# Patient Record
Sex: MALE | Race: WHITE | NOT HISPANIC OR LATINO | Employment: OTHER | URBAN - METROPOLITAN AREA
[De-identification: names, ages, dates, MRNs, and addresses within clinical notes are randomized per-mention and may not be internally consistent; named-entity substitution may affect disease eponyms.]

---

## 2018-03-07 ENCOUNTER — OFFICE VISIT (OUTPATIENT)
Dept: CARDIOLOGY | Facility: CLINIC | Age: 73
End: 2018-03-07

## 2018-03-07 VITALS
HEIGHT: 70 IN | HEART RATE: 53 BPM | DIASTOLIC BLOOD PRESSURE: 73 MMHG | BODY MASS INDEX: 28.35 KG/M2 | SYSTOLIC BLOOD PRESSURE: 158 MMHG | WEIGHT: 198 LBS

## 2018-03-07 DIAGNOSIS — E78.5 DYSLIPIDEMIA: ICD-10-CM

## 2018-03-07 DIAGNOSIS — R09.89 LABILE HYPERTENSION: ICD-10-CM

## 2018-03-07 DIAGNOSIS — I11.9 HYPERTENSIVE HEART DISEASE WITHOUT HEART FAILURE: Primary | ICD-10-CM

## 2018-03-07 PROCEDURE — 99213 OFFICE O/P EST LOW 20 MIN: CPT | Performed by: INTERNAL MEDICINE

## 2018-03-07 RX ORDER — ALFUZOSIN HYDROCHLORIDE 10 MG/1
10 TABLET, EXTENDED RELEASE ORAL DAILY
COMMUNITY

## 2018-03-07 RX ORDER — ATORVASTATIN CALCIUM 20 MG/1
20 TABLET, FILM COATED ORAL DAILY
COMMUNITY
End: 2020-08-21

## 2018-03-07 RX ORDER — CHLORAL HYDRATE 500 MG
CAPSULE ORAL
COMMUNITY

## 2018-03-07 RX ORDER — FINASTERIDE 5 MG/1
5 TABLET, FILM COATED ORAL DAILY
COMMUNITY

## 2018-03-07 RX ORDER — FEXOFENADINE HCL 180 MG/1
180 TABLET ORAL DAILY
COMMUNITY
End: 2022-11-10

## 2018-03-07 RX ORDER — MELATONIN
2000 DAILY
COMMUNITY

## 2018-03-07 RX ORDER — ASCORBIC ACID 500 MG
500 TABLET ORAL DAILY
COMMUNITY

## 2018-03-07 NOTE — PROGRESS NOTES
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      INTERNIST: Anand De Souza MD  VA PHYSICIAN: Kay Cannon MD  COLORECTAL SURGEON: Alfonso Damon MD  IMMUNOLOGIST:  Deysi Smith MD    Chief Complaint:   Chief Complaint   Patient presents with   • Hypertension     Problem List:  1. 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 Remote progressive exertional dyspnea/chest pain syndrome with acceptable GXT and borderline abnormal acceptable Holter monitor, April 2009.  b. Recurrent progressive NYHA class III symptoms with abnormal EKG and acceptable chest x-ray with essentially normal right dominant coronary artery and preserved systolic LV dysfunction, LVEF (0.66) with continued medical therapy felt warranted, July 2009.   c. Residual CCS class I symptoms.   2. Moderate dyslipidemia.   3. Labile hypertension.  4. Mildly overweight, BMI 28.4.  5. Obstructive sleep apnea with intermittent CPAP therapy (did not tolerate).   6. Hiatal hernia/probable GERD, April 2009.  7. Remote left calf DVT/Coumadin therapy - data deficit.   8. Chronic lower tract obstructive symptoms/probable BPH.   9. Crohn’s disease with partial colectomy and colostomy - data deficit.   10. 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   11. Chronic intermittent anxiety/remote depression.   12. Chronic insomnia.   13. Intermittent allergic rhinitis.   14. Remote operations:  a. Partial colectomy with colostomy, 2001.   b. Hand surgery secondary to trauma, 2004.     Allergies   Allergen Reactions   • Imuran [Azathioprine]       hemorrhagic urticaria.         Current Outpatient Prescriptions:   •  alfuzosin (UROXATRAL) 10 MG 24 hr tablet, Take 10 mg by mouth Daily., Disp: , Rfl:   •  aspirin 81 MG EC tablet, Take 81 mg by mouth Daily., Disp: , Rfl:   •  atorvastatin (LIPITOR)  20 MG tablet, Take 20 mg by mouth Daily., Disp: , Rfl:   •  Budesonide (ENTOCORT EC) 3 MG 24 hr capsule, Take 6 mg by mouth 2 (Two) Times a Day., Disp: , Rfl:   •  Calcium Carbonate (CALCIUM 600 PO), Take  by mouth Daily., Disp: , Rfl:   •  cholecalciferol (VITAMIN D3) 1000 units tablet, Take 2,000 Units by mouth Daily., Disp: , Rfl:   •  diphenoxylate-atropine (LOMOTIL) 2.5-0.025 MG per tablet, Take 1 tablet by mouth As Needed for diarrhea., Disp: , Rfl:   •  fexofenadine (ALLEGRA) 180 MG tablet, Take 180 mg by mouth Daily., Disp: , Rfl:   •  glucosamine sulfate 500 MG capsule capsule, Take 500 mg by mouth 2 (Two) Times a Day With Meals., Disp: , Rfl:   •  magnesium oxide (MAGOX) 400 (241.3 MG) MG tablet tablet, Take 400 mg by mouth Daily., Disp: , Rfl:   •  montelukast (SINGULAIR) 10 MG tablet, Take 10 mg by mouth Every Night., Disp: , Rfl:   •  Multiple Vitamins-Minerals (MULTIVITAMIN ADULT PO), Take  by mouth Daily., Disp: , Rfl:   •  Omega-3 Fatty Acids (FISH OIL) 1000 MG capsule capsule, Take  by mouth Daily With Breakfast., Disp: , Rfl:   •  omeprazole (priLOSEC) 20 MG capsule, Take 20 mg by mouth Daily., Disp: , Rfl:   •  sertraline (ZOLOFT) 50 MG tablet, Take 50 mg by mouth Daily., Disp: , Rfl:   •  vitamin C (ASCORBIC ACID) 500 MG tablet, Take 500 mg by mouth Daily., Disp: , Rfl:   · Finasteride 5 mg daily    HISTORY OF PRESENT ILLNESS: Patient returns for followup after a 16-month hiatus. He denies chest pain, SOB, palpitations, presyncope, edema, and syncope.  He has not had any hospitalizations or surgeries.  He had his flu and pneumonia vaccination last fall.  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  He has Crohn's disease and is now having plasma infusions once a week under the direction of Dr. Deysi Smith because of compromised immunity; data deficit.      Review of Systems   HENT: Positive for tinnitus.    Genitourinary: Positive for frequency.  "     Obtained and otherwise negative except as outlined in problem list and HPI.    Procedures       Objective:       Vitals:    03/07/18 1428 03/07/18 1436   BP: 125/82 158/73   BP Location: Left arm Left arm   Patient Position: Sitting Standing   Pulse: 50 53   Weight: 89.8 kg (198 lb)    Height: 177.8 cm (70\")      Body mass index is 28.41 kg/(m^2).   Last weight:  199 lbs.    Physical Exam   Constitutional: He is oriented to person, place, and time. He appears well-developed and well-nourished.   Neck: No JVD present. Carotid bruit is not present. No thyromegaly present.   Cardiovascular: Regular rhythm, S1 normal, S2 normal and normal heart sounds.  Exam reveals no gallop, no S3 and no friction rub.    No murmur heard.  Pulses:       Dorsalis pedis pulses are 2+ on the right side, and 2+ on the left side.        Posterior tibial pulses are 2+ on the right side, and 2+ on the left side.   Pulmonary/Chest: Effort normal and breath sounds normal. He has no wheezes. He has no rhonchi. He has no rales.   Abdominal: Soft. He exhibits no mass. There is no hepatosplenomegaly. There is no tenderness. There is no guarding.   Bowel sounds audible x4   Musculoskeletal: Normal range of motion. He exhibits no edema.   Lymphadenopathy:     He has no cervical adenopathy.   Neurological: He is alert and oriented to person, place, and time.   Skin: Skin is warm, dry and intact. No rash noted.   Vitals reviewed.        Lab Review: No recent laboratory results available for review today.          Assessment:   Overall continued acceptable course with no interim cardiopulmonary complaints with acceptable functional status. We will defer additional diagnostic or therapeutic intervention from a cardiac perspective at this time. We encouraged the patient to send us his most recent laboratory values.       Diagnosis Plan   1. Hypertensive heart disease without heart failure  Stable; No recurrent angina pectoris or CHF.     2. Labile " hypertension  Controlled   3. Dyslipidemia  No new labs          Plan:         1. Patient to continue current medications and close follow up with the above providers.  2. Tentative cardiology follow up in March 2019, or patient may return sooner PRN.    Scribed for Adria Mayers MD by Corine Newsome, APRN. 3/7/2018  2:37 PM    I, Adria Mayers MD, [facility], personally performed the services described in this documentation as scribed by the above named individual in my presence, and it is both accurate and complete. At 2:55 PM on 03/07/2018

## 2019-03-13 ENCOUNTER — OFFICE VISIT (OUTPATIENT)
Dept: CARDIOLOGY | Facility: CLINIC | Age: 74
End: 2019-03-13

## 2019-03-13 VITALS
WEIGHT: 200 LBS | DIASTOLIC BLOOD PRESSURE: 69 MMHG | HEART RATE: 61 BPM | BODY MASS INDEX: 28.63 KG/M2 | HEIGHT: 70 IN | SYSTOLIC BLOOD PRESSURE: 112 MMHG

## 2019-03-13 DIAGNOSIS — I11.9 HYPERTENSIVE HEART DISEASE WITHOUT HEART FAILURE: Primary | ICD-10-CM

## 2019-03-13 DIAGNOSIS — E78.5 DYSLIPIDEMIA: ICD-10-CM

## 2019-03-13 DIAGNOSIS — R09.89 LABILE HYPERTENSION: ICD-10-CM

## 2019-03-13 PROCEDURE — 99214 OFFICE O/P EST MOD 30 MIN: CPT | Performed by: INTERNAL MEDICINE

## 2019-03-13 NOTE — PROGRESS NOTES
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      INTERNIST: Anand De Souza MD  VA PHYSICIAN: Kay Cannon MD  COLORECTAL SURGEON: Alfonso Lester MD  IMMUNOLOGIST:  Deysi Smith MD    Chief Complaint:   Chief Complaint   Patient presents with   • Hypertension     Problem List:  1. 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 Remote progressive exertional dyspnea/chest pain syndrome with acceptable GXT and borderline abnormal acceptable Holter monitor, April 2009.  b. Recurrent progressive NYHA class III symptoms with abnormal EKG and acceptable chest x-ray with essentially normal right dominant coronary artery and preserved systolic LV dysfunction, LVEF (0.66) with continued medical therapy felt warranted, July 2009.   c. Residual CCS class I symptoms.   2. Moderate dyslipidemia.   3. Labile hypertension.  4. Mildly overweight, BMI 28.7.  5. Obstructive sleep apnea with intermittent CPAP therapy (did not tolerate).   6. Hiatal hernia/probable GERD, April 2009.  7. Remote left calf DVT/Coumadin therapy - data deficit.   8. Chronic lower tract obstructive symptoms/probable BPH.   9. Crohn’s disease with partial colectomy and colostomy - data deficit.   10. 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   11. Chronic intermittent anxiety/remote depression.   12. Chronic insomnia.   13. Intermittent allergic rhinitis.   14. Remote operations:  a. Partial colectomy with colostomy, 2001.   b. Hand surgery secondary to trauma, 2004.        Allergies   Allergen Reactions   • Imuran [Azathioprine]       hemorrhagic urticaria.       Current Outpatient Medications:   •  alfuzosin (UROXATRAL) 10 MG 24 hr tablet, Take 10 mg by mouth Daily., Disp: , Rfl:   •  aspirin 81 MG EC tablet, Take 81 mg by mouth Daily., Disp: , Rfl:   •  atorvastatin (LIPITOR)  20 MG tablet, Take 20 mg by mouth Daily., Disp: , Rfl:   •  Budesonide (ENTOCORT EC) 3 MG 24 hr capsule, Take 6 mg by mouth 2 (Two) Times a Day., Disp: , Rfl:   •  Calcium Carbonate (CALCIUM 600 PO), Take  by mouth Daily., Disp: , Rfl:   •  cholecalciferol (VITAMIN D3) 1000 units tablet, Take 2,000 Units by mouth Daily., Disp: , Rfl:   •  diphenoxylate-atropine (LOMOTIL) 2.5-0.025 MG per tablet, Take 1 tablet by mouth As Needed for diarrhea., Disp: , Rfl:   •  fexofenadine (ALLEGRA) 180 MG tablet, Take 180 mg by mouth Daily., Disp: , Rfl:   •  finasteride (PROSCAR) 5 MG tablet, Take 5 mg by mouth Daily., Disp: , Rfl:   •  glucosamine sulfate 500 MG capsule capsule, Take 500 mg by mouth 2 (Two) Times a Day With Meals., Disp: , Rfl:   •  magnesium oxide (MAGOX) 400 (241.3 MG) MG tablet tablet, Take 400 mg by mouth Daily., Disp: , Rfl:   •  montelukast (SINGULAIR) 10 MG tablet, Take 10 mg by mouth Every Night., Disp: , Rfl:   •  Multiple Vitamins-Minerals (MULTIVITAMIN ADULT PO), Take  by mouth Daily., Disp: , Rfl:   •  Omega-3 Fatty Acids (FISH OIL) 1000 MG capsule capsule, Take  by mouth Daily With Breakfast., Disp: , Rfl:   •  omeprazole (priLOSEC) 20 MG capsule, Take 20 mg by mouth Daily., Disp: , Rfl:   •  sertraline (ZOLOFT) 50 MG tablet, Take 50 mg by mouth Daily., Disp: , Rfl:   •  vitamin C (ASCORBIC ACID) 500 MG tablet, Take 500 mg by mouth Daily., Disp: , Rfl:     History of Present Illness: Patient returns for scheduled annual followup.  He denies any chest pain, shortness of breath, palpitations, presyncope, syncope, or edema.  He has had a little bit of depression over the winter because of the amount of rain, and he likes to be outdoors.  He also lost his brother last summer (2018) due to complications of throat cancer.  He has had more diarrhea lately, but he attributes this to his Crohn's disease.  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  He has not had any hospitalizations, new diagnoses, or  "surgeries.  Patient otherwise denies chest pain, shortness of breath, PND, edema, palpitations, syncope or presyncope at this time.      Review of Systems   HENT: Positive for tinnitus.    Hematologic/Lymphatic: Bruises/bleeds easily.   Gastrointestinal: Positive for bloating and diarrhea.   Neurological: Positive for headaches.      Obtained and negative except as outlined in problem list and HPI.    Procedures       Objective:       Vitals:    03/13/19 1413 03/13/19 1414   BP: 136/80 112/69   BP Location: Left arm Left arm   Patient Position: Sitting Standing   Pulse: 57 61   Weight: 90.7 kg (200 lb) 90.7 kg (200 lb)   Height: 177.8 cm (70\") 177.8 cm (70\")     Body mass index is 28.7 kg/m².   Last weight:  198 lbs.    Physical Exam   Constitutional: He is oriented to person, place, and time. He appears well-developed and well-nourished.   Neck: No JVD present. Carotid bruit is not present. No thyromegaly present.   Cardiovascular: Regular rhythm, S1 normal and S2 normal. Exam reveals no gallop, no S3 and no friction rub.   Murmur heard.   Medium-pitched early systolic murmur is present with a grade of 2/6 at the lower left sternal border.  Pulses:       Carotid pulses are 1+ on the right side, and 1+ on the left side.       Radial pulses are 1+ on the right side, and 1+ on the left side.        Femoral pulses are 1+ on the right side, and 1+ on the left side.       Popliteal pulses are 1+ on the right side, and 1+ on the left side.        Dorsalis pedis pulses are 1+ on the right side, and 1+ on the left side.        Posterior tibial pulses are 1+ on the right side, and 1+ on the left side.   Pulmonary/Chest: Effort normal. He has decreased breath sounds. He has no wheezes. He has no rhonchi. He has no rales.   Abdominal: Soft. He exhibits no mass. There is no hepatosplenomegaly. There is no tenderness. There is no guarding.   Bowel sounds audible x4   Musculoskeletal: Normal range of motion. He exhibits no edema. "   Lymphadenopathy:     He has no cervical adenopathy.   Neurological: He is alert and oriented to person, place, and time.   Skin: Skin is warm, dry and intact. No rash noted.   Vitals reviewed.        Lab Review:   8/20/2018:  · CMP: Glucose 91, uric acid 5.2, BUN 17, creatinine 1.16, GFR 63, sodium 144, potassium 4.3, chloride 103, calcium 9, phosphorus 2.9, protein 6.7, albumin 4.3, globulin 2.4, bilirubin 0.5, alkaline phosphatase 81, AST 28, ALT 17  · Lipid panel: Cholesterol 158, triglycerides 119, HDL 52, LDL 82  · TSH - 3.49  · CBC: WBC 4.1, RBC 4.56, hemoglobin 15, hematocrit 44.8, MCV 98, MCH 32.9, MCHC 33.5, RDW 14.2, platelets 171, neutrophils 68, lymphocytes 23, monocytes 5, eos 3, basos 1        Assessment:       Overall continued acceptable course with no interim cardiopulmonary complaints with good functional status. We will defer additional diagnostic or therapeutic intervention from a cardiac perspective at this time.     Diagnosis Plan   1. Hypertensive heart disease without heart failure  Stable; No recurrent angina pectoris or CHF on current activity schedule; continue current treatment     2. Labile hypertension  Controlled   3. Dyslipidemia  Acceptable          Plan:         1. Patient to continue current medications and close follow up with the above providers.  2. Tentative cardiology follow up in March 2020, or patient may return sooner PRN.    Scribed for Adria Mayers MD by Corine Newsome, APRN. 3/13/2019  2:36 PM    I, Adria Mayers MD, [facility], personally performed the services described in this documentation as scribed by the above named individual in my presence, and it is both accurate and complete. At 3:50 PM on 03/13/2019

## 2019-08-23 ENCOUNTER — TELEPHONE (OUTPATIENT)
Dept: CARDIOLOGY | Facility: CLINIC | Age: 74
End: 2019-08-23

## 2019-08-23 NOTE — TELEPHONE ENCOUNTER
Pt's wife called and stated that pt wore a holter monitor ordered by the PCP and that the PCP wants patient seen by [facility].    Called Dr Anand De Souza' office for records. Waiting on records to be faxed.

## 2019-08-27 NOTE — TELEPHONE ENCOUNTER
Holter monitor report received and given to [facility] for review.    Would you like to work this patient in?    Please advise

## 2019-08-27 NOTE — TELEPHONE ENCOUNTER
Called pt's wife to offer appt on 8/29/19 and she stated that they would be out of town, transferred pt to [facility] to work-in the next week.

## 2019-09-06 ENCOUNTER — OFFICE VISIT (OUTPATIENT)
Dept: CARDIOLOGY | Facility: CLINIC | Age: 74
End: 2019-09-06

## 2019-09-06 VITALS
BODY MASS INDEX: 27.2 KG/M2 | HEIGHT: 70 IN | HEART RATE: 58 BPM | DIASTOLIC BLOOD PRESSURE: 79 MMHG | SYSTOLIC BLOOD PRESSURE: 144 MMHG | WEIGHT: 190 LBS

## 2019-09-06 DIAGNOSIS — R09.89 LABILE HYPERTENSION: ICD-10-CM

## 2019-09-06 DIAGNOSIS — I11.9 HYPERTENSIVE HEART DISEASE WITHOUT HEART FAILURE: Primary | ICD-10-CM

## 2019-09-06 DIAGNOSIS — R53.83 FATIGUE, UNSPECIFIED TYPE: ICD-10-CM

## 2019-09-06 DIAGNOSIS — E78.5 DYSLIPIDEMIA: ICD-10-CM

## 2019-09-06 DIAGNOSIS — R94.31 ABNORMAL ELECTROCARDIOGRAM: ICD-10-CM

## 2019-09-06 PROCEDURE — 99214 OFFICE O/P EST MOD 30 MIN: CPT | Performed by: INTERNAL MEDICINE

## 2019-09-06 NOTE — PROGRESS NOTES
Subjective:     Encounter Date:09/06/2019    Patient ID: Luis Gamboa is a 74 y.o.  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      INTERNIST: Anand De Souza MD  [facility] PHYSICIAN: Kay Cannon MD  COLORECTAL SURGEON: Alfonso Lester MD  IMMUNOLOGIST:  Deysi Smith MD    Chief Complaint:   Chief Complaint   Patient presents with   • Hypertension     Problem List:  1. 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 Remote progressive exertional dyspnea/chest pain syndrome with acceptable GXT and borderline abnormal acceptable Holter monitor, April 2009.  b. Recurrent progressive NYHA class III symptoms with abnormal EKG and acceptable chest x-ray with essentially normal right dominant coronary artery and preserved systolic LV dysfunction, LVEF (0.66) with continued medical therapy felt warranted, July 2009.   c. CCS class 0 chest discomfort/NYHA class II-III fatigue symptoms with abnormal monitor demonstrating bradycardia, August 2019.   2. Bradycardia with Holter monitor 8/20/2019: Average heart rate was 52 bpm, heart rate less than 50 bpm 68% the time but there were no pauses and only 9 PVCs and 2200 PACs which represent 3% total heartbeats with no atrial fibrillation  3. Moderate dyslipidemia.   4. Labile hypertension.  5. Mildly overweight, BMI 27.3.  6. Obstructive sleep apnea with intermittent CPAP therapy (did not tolerate).   7. Hiatal hernia/probable GERD, April 2009.  8. Remote left calf DVT/Coumadin therapy - data deficit.   9. Chronic lower tract obstructive symptoms/probable BPH.   10. Crohn’s disease with partial colectomy and colostomy - data deficit.   11. 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   12. Chronic intermittent anxiety/remote depression.   13. Chronic insomnia.   14. Intermittent allergic rhinitis.   15. Remote operations:  a. Partial colectomy with colostomy, 2001.   b. Hand surgery secondary to  trauma, 2004.    Allergies   Allergen Reactions   • Imuran [Azathioprine]       hemorrhagic urticaria.         Current Outpatient Medications:   •  alfuzosin (UROXATRAL) 10 MG 24 hr tablet, Take 10 mg by mouth Daily., Disp: , Rfl:   •  aspirin 81 MG EC tablet, Take 81 mg by mouth Every Other Day., Disp: , Rfl:   •  atorvastatin (LIPITOR) 20 MG tablet, Take 20 mg by mouth Daily., Disp: , Rfl:   •  Budesonide (ENTOCORT EC) 3 MG 24 hr capsule, Take 6 mg by mouth Take As Directed. Every 4th day, Disp: , Rfl:   •  Calcium Carbonate (CALCIUM 600 PO), Take  by mouth Daily., Disp: , Rfl:   •  cholecalciferol (VITAMIN D3) 1000 units tablet, Take 2,000 Units by mouth Daily., Disp: , Rfl:   •  diphenoxylate-atropine (LOMOTIL) 2.5-0.025 MG per tablet, Take 1 tablet by mouth As Needed for diarrhea., Disp: , Rfl:   •  fexofenadine (ALLEGRA) 180 MG tablet, Take 180 mg by mouth Daily., Disp: , Rfl:   •  finasteride (PROSCAR) 5 MG tablet, Take 5 mg by mouth Daily., Disp: , Rfl:   •  glucosamine sulfate 500 MG capsule capsule, Take 1,000 mg by mouth Daily., Disp: , Rfl:   •  immune globulin, human, (HIZENTRA) 1 GM/5ML solution, Inject  under the skin into the appropriate area as directed. Infusion, Disp: , Rfl:   •  magnesium oxide (MAGOX) 400 (241.3 MG) MG tablet tablet, Take 500 mg by mouth Daily., Disp: , Rfl:   •  montelukast (SINGULAIR) 10 MG tablet, Take 10 mg by mouth Every Night., Disp: , Rfl:   •  Multiple Vitamins-Minerals (MULTIVITAMIN ADULT PO), Take  by mouth Daily., Disp: , Rfl:   •  Omega-3 Fatty Acids (FISH OIL) 1000 MG capsule capsule, Take  by mouth Daily With Breakfast., Disp: , Rfl:   •  omeprazole (priLOSEC) 20 MG capsule, Take 20 mg by mouth Daily., Disp: , Rfl:   •  sertraline (ZOLOFT) 50 MG tablet, Take 50 mg by mouth Daily., Disp: , Rfl:   •  vitamin C (ASCORBIC ACID) 500 MG tablet, Take 500 mg by mouth Daily., Disp: , Rfl:      HISTORY OF PRESENT ILLNESS: Patient returns early for followup after a 6-month  "hiatus.  In the interim, he apparently wore a Holter monitor ordered by his physician, and the physician wanted the patient to have followup sooner in our office.  The holter monitor demonstrated the following: Average heart rate was 52 bpm, heart rate less than 50 bpm 68% of the time, but there were no pauses and only 9 PVCs and 2200 PACs which represented 3% total heartbeats with no atrial fibrillation.  There was no apparent chest pain, shortness of breath, presyncope, or syncope.  He occasionally has palpitations and some lightheadedness with loss of balance.  He denies any other symptoms associated with this.  The palpitations are infrequent.  His main complaints are of fatigue, and he feels like he cannot do any activities like he used to.  He has concerns that his heart rate is too low causing part of his fatigue.  He states that he had a heart catheterization a couple of years ago which was normal.  Patient otherwise denies chest pain, shortness of breath, PND, edema, palpitations, syncope or presyncope at this time.  He hand carries his laboratory results which are reviewed with him in office (see below).  He is accompanied to the office today by his wife.  He was told by his APRN grandson in [place] that he needs a CPK and possible CBC.  [facility], and he has been referred to a neurologist because of his fatigue and tremor and will have a head CT scan - data deficit.        Review of Systems   Constitution: Positive for malaise/fatigue.   All other systems reviewed and are negative.     All other systems reviewed and otherwise negative.    Procedures       Objective:       Vitals:    09/06/19 1520 09/06/19 1526   BP: 143/80 144/79   BP Location: Left arm Left arm   Patient Position: Sitting Standing   Pulse: 53 58   Weight: 86.2 kg (190 lb)    Height: 177.8 cm (70\")    Recheck blood pressure right arm sitting was 138/70  Body mass index " is 27.26 kg/m².   Last weight:  200 lbs.    Physical Exam   Constitutional: He is oriented to person, place, and time. He appears well-developed and well-nourished.   Neck: No JVD present. Carotid bruit is not present. No thyromegaly present.   Cardiovascular: Regular rhythm, S1 normal and S2 normal. Bradycardia present. Exam reveals no gallop, no S3 and no friction rub.   Murmur heard.   Medium-pitched harsh early systolic murmur is present with a grade of 2/6 at the lower left sternal border.  Pulses:       Carotid pulses are 1+ on the right side, and 1+ on the left side with bruit.       Radial pulses are 1+ on the right side, and 1+ on the left side.        Femoral pulses are 1+ on the right side, and 1+ on the left side.       Popliteal pulses are 1+ on the right side, and 1+ on the left side.        Dorsalis pedis pulses are 1+ on the right side, and 1+ on the left side.        Posterior tibial pulses are 1+ on the right side, and 1+ on the left side.   Pulmonary/Chest: Effort normal. He has decreased breath sounds. He has no wheezes. He has no rhonchi. He has no rales.   Abdominal: Soft. He exhibits no mass. There is no hepatosplenomegaly. There is no tenderness. There is no guarding.   Bowel sounds audible x4   Musculoskeletal: Normal range of motion. He exhibits no edema.   Lymphadenopathy:     He has no cervical adenopathy.   Neurological: He is alert and oriented to person, place, and time.   Skin: Skin is warm, dry and intact. No rash noted.   Vitals reviewed.        Lab Review: Holter monitor, August 2019 - average heart rate 52 bpm, heart rate of less than 50 sixty-eight percent of the time, no pauses, 9 PVCs, 2200 PACs    · ECG 7/29/2019: Sinus bradycardia with short NY with PACs, T wave abnormal, consider anterior ischemia, abnormal ECG, 48 bpm,  ms, QRS 80 ms,  ms    7/29/2019:  · TSH - 2.46  · Ferritin - 24  · Vitamin B12 - 424  · CBC: WBC 4.4, RBC 4.1, hemoglobin 13.5, hematocrit 41,  MCV 99, MCH 32.6, MCHC 32.9, RDW 13, platelets 143, MPV 10.5, lymphocytes 16.8, monocytes 9.1, granulocytes 71.3, ES 1.6, basos 0.7  · CMP: Sodium 138, potassium 4.2, chloride 102, carbon dioxide 29, glucose 82, BUN 16, creatinine 1.11, GFR greater than 60, AST 40, ALT 19, alkaline phosphatase 72, protein 7, albumin 4, bilirubin 0.4, calcium 8.5    8/19/2019:  · CMP: Glucose 90, uric acid 5.3, BUN 14, creatinine 1.23, GFR 58, sodium 144, potassium 4.9, chloride 103, calcium 8.9, phosphorus 2.8, protein 6.9, albumin 4.4, globulin 2.5, bilirubin 0.5, alkaline phosphatase 81, AST 25, ALT 16  · Lipid panel: Cholesterol 156, triglycerides 104, HDL 48, LDL 87  · TSH - 3.15  · CBC: WBC 4.2, RBC 4.51, hemoglobin 14.9, hematocrit 44.1, MCV 98, MCH 33, MCHC 33.8, RDW 13.8, platelets 161, neutrophils 71, lymphs/20, monos 7, eos 1, basos 1    8/6/2019:  · IgA - 22  · IgG - 1170  · IgM - 16        Assessment:   Patient with bradycardia and fatigue.  The patient had an acceptable Holter monitor 8/20/2019 with persistent bradycardia and the patient is not on any beta-blocker therapy.  There were no pauses or atrial fibrillation on his Holter monitor.  We will have him do an echo GXT with continuous oximetry to look for chronotropic incompetence.  Defer pacemaker at this time.       Diagnosis Plan   1. Hypertensive heart disease without heart failure   Echo GXT with continuous oximetry to look for chronotropic incompetence   2. Labile hypertension   Controlled   3. Dyslipidemia   Acceptable results August 2019, continue atorvastatin          Plan:         1. Patient to continue current medications and close follow up with the above providers.  2. Tentative cardiology follow up in March 2020, or patient may return sooner PRN.  3. Echo GXT with continuous oximetry  4. CPK and sedimentation rate ordered    Scribed for Adria Mayers MD by Corine Newsome, ROSALBA. 9/6/2019  3:54 PM    I, Adria Mayers MD, FACC, personally performed  the services described in this documentation as scribed by the above named individual in my presence, and it is both accurate and complete. At 4:19 PM on 09/06/2019

## 2019-09-12 ENCOUNTER — LAB (OUTPATIENT)
Dept: LAB | Facility: HOSPITAL | Age: 74
End: 2019-09-12

## 2019-09-12 ENCOUNTER — HOSPITAL ENCOUNTER (OUTPATIENT)
Dept: CARDIOLOGY | Facility: HOSPITAL | Age: 74
Discharge: HOME | End: 2019-09-12
Admitting: INTERNAL MEDICINE

## 2019-09-12 VITALS — BODY MASS INDEX: 31.21 KG/M2 | WEIGHT: 218 LBS | HEIGHT: 70 IN

## 2019-09-12 DIAGNOSIS — R53.83 FATIGUE, UNSPECIFIED TYPE: ICD-10-CM

## 2019-09-12 DIAGNOSIS — R94.31 ABNORMAL ELECTROCARDIOGRAM: ICD-10-CM

## 2019-09-12 DIAGNOSIS — I11.9 HYPERTENSIVE HEART DISEASE WITHOUT HEART FAILURE: ICD-10-CM

## 2019-09-12 LAB
BH CV ECHO MEAS - BSA(HAYCOCK): 2.3 M^2
BH CV ECHO MEAS - BSA: 2.3 M^2
BH CV ECHO MEAS - BZI_BMI: 26.5 KILOGRAMS/M^2
BH CV ECHO MEAS - BZI_METRIC_HEIGHT: 193 CM
BH CV ECHO MEAS - BZI_METRIC_WEIGHT: 98.9 KG
BH CV STRESS BP STAGE 1: NORMAL
BH CV STRESS BP STAGE 2: NORMAL
BH CV STRESS DURATION MIN STAGE 1: 3
BH CV STRESS DURATION MIN STAGE 2: 2
BH CV STRESS DURATION SEC STAGE 1: 0
BH CV STRESS DURATION SEC STAGE 2: 54
BH CV STRESS ECHO POST STRESS EJECTION FRACTION EF: 75 %
BH CV STRESS GRADE STAGE 1: 10
BH CV STRESS GRADE STAGE 2: 12
BH CV STRESS HR STAGE 1: 100
BH CV STRESS HR STAGE 2: 122
BH CV STRESS METS STAGE 1: 5
BH CV STRESS METS STAGE 2: 7.5
BH CV STRESS O2 STAGE 1: 96
BH CV STRESS O2 STAGE 2: 98
BH CV STRESS PROTOCOL 1: NORMAL
BH CV STRESS RECOVERY BP: NORMAL MMHG
BH CV STRESS RECOVERY HR: 61 BPM
BH CV STRESS RECOVERY O2: 99 %
BH CV STRESS SPEED STAGE 1: 1.7
BH CV STRESS SPEED STAGE 2: 2.5
BH CV STRESS STAGE 1: 1
BH CV STRESS STAGE 2: 2
CK SERPL-CCNC: 86 U/L (ref 20–200)
DEPRECATED RDW RBC AUTO: 51.9 FL (ref 37–54)
ERYTHROCYTE [DISTWIDTH] IN BLOOD BY AUTOMATED COUNT: 13.6 % (ref 12.3–15.4)
HCT VFR BLD AUTO: 45.5 % (ref 37.5–51)
HGB BLD-MCNC: 14.6 G/DL (ref 13–17.7)
LV EF 2D ECHO EST: 65 %
MAXIMAL PREDICTED HEART RATE: 147 BPM
MCH RBC QN AUTO: 33 PG (ref 26.6–33)
MCHC RBC AUTO-ENTMCNC: 32.1 G/DL (ref 31.5–35.7)
MCV RBC AUTO: 102.7 FL (ref 79–97)
PERCENT MAX PREDICTED HR: 85.03 %
PLATELET # BLD AUTO: 153 10*3/MM3 (ref 140–450)
PMV BLD AUTO: 10.5 FL (ref 6–12)
RBC # BLD AUTO: 4.43 10*6/MM3 (ref 4.14–5.8)
STRESS BASELINE BP: NORMAL MMHG
STRESS BASELINE HR: 59 BPM
STRESS O2 SAT REST: 95 %
STRESS PERCENT HR: 100 %
STRESS POST ESTIMATED WORKLOAD: 7 METS
STRESS POST EXERCISE DUR MIN: 5 MIN
STRESS POST EXERCISE DUR SEC: 54 SEC
STRESS POST O2 SAT PEAK: 98 %
STRESS POST PEAK BP: NORMAL MMHG
STRESS POST PEAK HR: 125 BPM
STRESS TARGET HR: 125 BPM
WBC NRBC COR # BLD: 5.23 10*3/MM3 (ref 3.4–10.8)

## 2019-09-12 PROCEDURE — 85652 RBC SED RATE AUTOMATED: CPT

## 2019-09-12 PROCEDURE — 93325 DOPPLER ECHO COLOR FLOW MAPG: CPT

## 2019-09-12 PROCEDURE — 93325 DOPPLER ECHO COLOR FLOW MAPG: CPT | Performed by: INTERNAL MEDICINE

## 2019-09-12 PROCEDURE — 93320 DOPPLER ECHO COMPLETE: CPT | Performed by: INTERNAL MEDICINE

## 2019-09-12 PROCEDURE — 36415 COLL VENOUS BLD VENIPUNCTURE: CPT

## 2019-09-12 PROCEDURE — 85027 COMPLETE CBC AUTOMATED: CPT

## 2019-09-12 PROCEDURE — 82550 ASSAY OF CK (CPK): CPT

## 2019-09-12 PROCEDURE — 93017 CV STRESS TEST TRACING ONLY: CPT

## 2019-09-12 PROCEDURE — 93350 STRESS TTE ONLY: CPT | Performed by: INTERNAL MEDICINE

## 2019-09-12 PROCEDURE — 93320 DOPPLER ECHO COMPLETE: CPT

## 2019-09-12 PROCEDURE — 93018 CV STRESS TEST I&R ONLY: CPT | Performed by: INTERNAL MEDICINE

## 2019-09-12 PROCEDURE — 93350 STRESS TTE ONLY: CPT

## 2019-09-13 ENCOUNTER — TELEPHONE (OUTPATIENT)
Dept: CARDIOLOGY | Facility: CLINIC | Age: 74
End: 2019-09-13

## 2019-09-13 LAB — ERYTHROCYTE [SEDIMENTATION RATE] IN BLOOD: 8 MM/HR (ref 0–20)

## 2019-09-13 NOTE — TELEPHONE ENCOUNTER
Pt's wife given results of stress test. Letter to follow. Encourage a monitored exercise program although will not qualify for Phase II cardiac rehab.

## 2019-09-24 ENCOUNTER — APPOINTMENT (OUTPATIENT)
Dept: CT IMAGING | Facility: HOSPITAL | Age: 74
End: 2019-09-24

## 2019-09-24 ENCOUNTER — HOSPITAL ENCOUNTER (EMERGENCY)
Facility: HOSPITAL | Age: 74
Discharge: HOME | End: 2019-09-24
Attending: EMERGENCY MEDICINE | Admitting: EMERGENCY MEDICINE

## 2019-09-24 VITALS
HEART RATE: 42 BPM | WEIGHT: 200 LBS | HEIGHT: 71 IN | BODY MASS INDEX: 28 KG/M2 | DIASTOLIC BLOOD PRESSURE: 69 MMHG | TEMPERATURE: 98.6 F | SYSTOLIC BLOOD PRESSURE: 125 MMHG | RESPIRATION RATE: 18 BRPM | OXYGEN SATURATION: 96 %

## 2019-09-24 DIAGNOSIS — N20.1 LEFT URETERAL STONE: Primary | ICD-10-CM

## 2019-09-24 DIAGNOSIS — R19.00 INTRAABDOMINAL MASS: ICD-10-CM

## 2019-09-24 LAB
ALBUMIN SERPL-MCNC: 4.2 G/DL (ref 3.5–5.2)
ALBUMIN/GLOB SERPL: 1.4 G/DL
ALP SERPL-CCNC: 80 U/L (ref 39–117)
ALT SERPL W P-5'-P-CCNC: 14 U/L (ref 1–41)
ANION GAP SERPL CALCULATED.3IONS-SCNC: 8 MMOL/L (ref 5–15)
AST SERPL-CCNC: 26 U/L (ref 1–40)
BASOPHILS # BLD AUTO: 0.03 10*3/MM3 (ref 0–0.2)
BASOPHILS NFR BLD AUTO: 0.5 % (ref 0–1.5)
BILIRUB SERPL-MCNC: 0.3 MG/DL (ref 0.2–1.2)
BILIRUB UR QL STRIP: NEGATIVE
BUN BLD-MCNC: 14 MG/DL (ref 8–23)
BUN/CREAT SERPL: 11.1 (ref 7–25)
CALCIUM SPEC-SCNC: 9 MG/DL (ref 8.6–10.5)
CHLORIDE SERPL-SCNC: 103 MMOL/L (ref 98–107)
CLARITY UR: CLEAR
CO2 SERPL-SCNC: 30 MMOL/L (ref 22–29)
COLOR UR: YELLOW
CREAT BLD-MCNC: 1.26 MG/DL (ref 0.76–1.27)
D-LACTATE SERPL-SCNC: 0.8 MMOL/L (ref 0.5–2)
DEPRECATED RDW RBC AUTO: 46.6 FL (ref 37–54)
EOSINOPHIL # BLD AUTO: 0.1 10*3/MM3 (ref 0–0.4)
EOSINOPHIL NFR BLD AUTO: 1.7 % (ref 0.3–6.2)
ERYTHROCYTE [DISTWIDTH] IN BLOOD BY AUTOMATED COUNT: 12.9 % (ref 12.3–15.4)
GFR SERPL CREATININE-BSD FRML MDRD: 56 ML/MIN/1.73
GLOBULIN UR ELPH-MCNC: 3.1 GM/DL
GLUCOSE BLD-MCNC: 105 MG/DL (ref 65–99)
GLUCOSE UR STRIP-MCNC: NEGATIVE MG/DL
HCT VFR BLD AUTO: 42.7 % (ref 37.5–51)
HGB BLD-MCNC: 14.3 G/DL (ref 13–17.7)
HGB UR QL STRIP.AUTO: NEGATIVE
HOLD SPECIMEN: NORMAL
HOLD SPECIMEN: NORMAL
IMM GRANULOCYTES # BLD AUTO: 0.02 10*3/MM3 (ref 0–0.05)
IMM GRANULOCYTES NFR BLD AUTO: 0.3 % (ref 0–0.5)
KETONES UR QL STRIP: NEGATIVE
LEUKOCYTE ESTERASE UR QL STRIP.AUTO: NEGATIVE
LIPASE SERPL-CCNC: 38 U/L (ref 13–60)
LYMPHOCYTES # BLD AUTO: 0.72 10*3/MM3 (ref 0.7–3.1)
LYMPHOCYTES NFR BLD AUTO: 12.1 % (ref 19.6–45.3)
MCH RBC QN AUTO: 32.9 PG (ref 26.6–33)
MCHC RBC AUTO-ENTMCNC: 33.5 G/DL (ref 31.5–35.7)
MCV RBC AUTO: 98.2 FL (ref 79–97)
MONOCYTES # BLD AUTO: 0.43 10*3/MM3 (ref 0.1–0.9)
MONOCYTES NFR BLD AUTO: 7.2 % (ref 5–12)
NEUTROPHILS # BLD AUTO: 4.65 10*3/MM3 (ref 1.7–7)
NEUTROPHILS NFR BLD AUTO: 78.2 % (ref 42.7–76)
NITRITE UR QL STRIP: NEGATIVE
NRBC BLD AUTO-RTO: 0 /100 WBC (ref 0–0.2)
PH UR STRIP.AUTO: 7 [PH] (ref 5–8)
PLATELET # BLD AUTO: 144 10*3/MM3 (ref 140–450)
PMV BLD AUTO: 9.5 FL (ref 6–12)
POTASSIUM BLD-SCNC: 4.5 MMOL/L (ref 3.5–5.2)
PROT SERPL-MCNC: 7.3 G/DL (ref 6–8.5)
PROT UR QL STRIP: NEGATIVE
RBC # BLD AUTO: 4.35 10*6/MM3 (ref 4.14–5.8)
SODIUM BLD-SCNC: 141 MMOL/L (ref 136–145)
SP GR UR STRIP: 1.01 (ref 1–1.03)
UROBILINOGEN UR QL STRIP: NORMAL
WBC NRBC COR # BLD: 5.95 10*3/MM3 (ref 3.4–10.8)
WHOLE BLOOD HOLD SPECIMEN: NORMAL
WHOLE BLOOD HOLD SPECIMEN: NORMAL

## 2019-09-24 PROCEDURE — 25010000002 IOPAMIDOL 61 % SOLUTION: Performed by: EMERGENCY MEDICINE

## 2019-09-24 PROCEDURE — 85025 COMPLETE CBC W/AUTO DIFF WBC: CPT

## 2019-09-24 PROCEDURE — 25010000002 MORPHINE PER 10 MG: Performed by: EMERGENCY MEDICINE

## 2019-09-24 PROCEDURE — 81003 URINALYSIS AUTO W/O SCOPE: CPT

## 2019-09-24 PROCEDURE — 96374 THER/PROPH/DIAG INJ IV PUSH: CPT

## 2019-09-24 PROCEDURE — 96375 TX/PRO/DX INJ NEW DRUG ADDON: CPT

## 2019-09-24 PROCEDURE — 74177 CT ABD & PELVIS W/CONTRAST: CPT

## 2019-09-24 PROCEDURE — 99285 EMERGENCY DEPT VISIT HI MDM: CPT

## 2019-09-24 PROCEDURE — 25010000002 ONDANSETRON PER 1 MG: Performed by: EMERGENCY MEDICINE

## 2019-09-24 PROCEDURE — 83690 ASSAY OF LIPASE: CPT

## 2019-09-24 PROCEDURE — 80053 COMPREHEN METABOLIC PANEL: CPT

## 2019-09-24 PROCEDURE — 83605 ASSAY OF LACTIC ACID: CPT

## 2019-09-24 RX ORDER — SODIUM CHLORIDE 0.9 % (FLUSH) 0.9 %
10 SYRINGE (ML) INJECTION AS NEEDED
Status: DISCONTINUED | OUTPATIENT
Start: 2019-09-24 | End: 2019-09-24 | Stop reason: HOSPADM

## 2019-09-24 RX ORDER — ONDANSETRON 4 MG/1
4 TABLET, ORALLY DISINTEGRATING ORAL EVERY 6 HOURS PRN
Qty: 15 TABLET | Refills: 0 | Status: SHIPPED | OUTPATIENT
Start: 2019-09-24

## 2019-09-24 RX ORDER — MORPHINE SULFATE 4 MG/ML
4 INJECTION, SOLUTION INTRAMUSCULAR; INTRAVENOUS ONCE
Status: COMPLETED | OUTPATIENT
Start: 2019-09-24 | End: 2019-09-24

## 2019-09-24 RX ORDER — OXYCODONE AND ACETAMINOPHEN 7.5; 325 MG/1; MG/1
1 TABLET ORAL EVERY 6 HOURS PRN
Qty: 15 TABLET | Refills: 0 | Status: SHIPPED | OUTPATIENT
Start: 2019-09-24 | End: 2022-11-10

## 2019-09-24 RX ORDER — ONDANSETRON 2 MG/ML
4 INJECTION INTRAMUSCULAR; INTRAVENOUS ONCE
Status: COMPLETED | OUTPATIENT
Start: 2019-09-24 | End: 2019-09-24

## 2019-09-24 RX ADMIN — SODIUM CHLORIDE 1000 ML: 9 INJECTION, SOLUTION INTRAVENOUS at 07:28

## 2019-09-24 RX ADMIN — IOPAMIDOL 100 ML: 612 INJECTION, SOLUTION INTRAVENOUS at 08:30

## 2019-09-24 RX ADMIN — MORPHINE SULFATE 4 MG: 4 INJECTION, SOLUTION INTRAMUSCULAR; INTRAVENOUS at 07:29

## 2019-09-24 RX ADMIN — ONDANSETRON 4 MG: 2 INJECTION INTRAMUSCULAR; INTRAVENOUS at 07:29

## 2019-09-24 NOTE — ED PROVIDER NOTES
Subjective   Luis Gamboa is a 74 y.o. male with Chron's disease who presents to the ED with complaints of abdominal pain. He reports that he began experiencing left sided abdominal pain about 9 hours ago. He has an ostomy bag and reports that it hasn't had any output since last night. He has also experienced nausea and vomiting. He denies experiencing fever, chest pain, shortness of breath, or dysuria. He states that he felt normal before the pain onset. He has had his ostomy bag since 2001 and reports that he has never experienced a blockage before. He states that he drank some water last night which reduced the pain a little bit. He denies any recent changes to his medication and states that he has been taking his medicine normally. He denies other past abdominal surgeries other than an appendectomy. There are no other acute complaints at this time.        History provided by:  Patient  Abdominal Pain   Pain location: left sided.  Pain radiates to:  Does not radiate  Pain severity:  Moderate  Onset quality:  Sudden  Duration:  9 hours  Timing:  Constant  Progression:  Unchanged  Chronicity:  New  Context: not medication withdrawal    Relieved by:  Nothing  Associated symptoms: nausea and vomiting    Associated symptoms: no chest pain, no dysuria, no fever and no shortness of breath        Review of Systems   Constitutional: Negative for fever.   Respiratory: Negative for shortness of breath.    Cardiovascular: Negative for chest pain.   Gastrointestinal: Positive for abdominal pain, nausea and vomiting.   Genitourinary: Negative for dysuria.   All other systems reviewed and are negative.      Past Medical History:   Diagnosis Date   • Allergic rhinitis 11/3/2016    Intermittent   • Chronic anxiety 11/3/2016    Chronic intermittent anxiety/remote depression.    • Chronic insomnia 11/3/2016   • Crohn's disease (CMS/[facility]) 11/3/2016    Crohn’s disease with partial colectomy and colostomy - data deficit.    • DVT  (deep venous thrombosis) (CMS/HCC)     Remote left calf DVT/Coumadin therapy - data deficit.   • Dyslipidemia 11/3/2016    Moderate   • Hiatal hernia 11/3/2016    probable GERD, 2009.   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 Recurrent progressive NYHA class III symptoms with abnormal EKG and acceptable chest x-ray with essentially normal right dominant coronary artery and preserved systolic LV dysfunction, LVEF (0.66) with continued m   • Labile hypertension 11/3/2016   • Obstructive sleep apnea on CPAP 11/3/2016    Obstructive sleep apnea with intermittent CPAP therapy (did not tolerate).     • Vitamin deficiency     Chronic vitamin deficiency secondary to Crohn’s disease.         Allergies   Allergen Reactions   • Imuran [Azathioprine]       hemorrhagic urticaria.       Past Surgical History:   Procedure Laterality Date   • COLECTOMY PARTIAL / TOTAL      with colostomy   • HAND SURGERY      secondary to trauma       Family History   Problem Relation Age of Onset   • No Known Problems Mother    • No Known Problems Father        Social History     Socioeconomic History   • Marital status:      Spouse name: Not on file   • Number of children: Not on file   • Years of education: Not on file   • Highest education level: Not on file   Tobacco Use   • Smoking status: Former Smoker     Types: Cigarettes     Last attempt to quit:      Years since quittin.7   • Smokeless tobacco: Never Used   Substance and Sexual Activity   • Alcohol use: No   • Drug use: No   • Sexual activity: Defer         Objective   Physical Exam   Constitutional: He is oriented to person, place, and time. He appears well-developed and well-nourished.   HENT:   Head: Normocephalic and atraumatic.   Nose: Nose normal.   Eyes:  Conjunctivae are normal. No scleral icterus.   Neck: Normal range of motion. Neck supple.   Cardiovascular: Normal rate, regular rhythm, normal heart sounds and intact distal pulses.   No murmur heard.  Pulmonary/Chest: Effort normal and breath sounds normal. No respiratory distress.   Abdominal: Soft. He exhibits no mass. There is tenderness (left sided). There is no rebound and no guarding.   Moderate left sided tenderness with no guarding and no rebound. Left lower quadrant colostomy with empty bag. No masses.   Musculoskeletal: Normal range of motion. He exhibits no edema.   Neurological: He is alert and oriented to person, place, and time.   Skin: Skin is warm and dry.   Psychiatric: He has a normal mood and affect. His behavior is normal.   Nursing note and vitals reviewed.      Procedures         ED Course  ED Course as of Sep 24 1350   Tue Sep 24, 2019   0946 JUAN request number: [id number]    JUAN query complete. Treatment plan to include limited course of prescribed  controlled substance. Risks including addiction, benefits, and alternatives presented to patient.   [AA]      ED Course User Index  [AA] Jose Gaona               CT shows L ureteral stone, large.  Pain down to a 1 with single dose of pain meds.  No evidence of infection or SBO or other more serious pathology.  Patient stable on serial rechecks.  Discussed findings, concerns, plan of care, expected course, reasons to return and followup.    Mass noted, discussed with pt and family.  Followup necessary.  Gave them a copy of CT and report to take to PCP to facilitate further workup.        MDM  Number of Diagnoses or Management Options  Intraabdominal mass:   Left ureteral stone:      Amount and/or Complexity of Data Reviewed  Clinical lab tests: ordered and reviewed  Tests in the radiology section of CPT®: ordered and reviewed  Decide to obtain previous medical records or to obtain history from someone other than the patient: yes  Obtain  history from someone other than the patient: yes  Independent visualization of images, tracings, or specimens: yes        Final diagnoses:   Left ureteral stone   Intraabdominal mass       Documentation assistance provided by maxim Gaona.  Information recorded by the scribe was done at my direction and has been verified and validated by me.     Jose Gaona  09/24/19 4716       Vincent [place] MD  09/24/19 3266

## 2020-08-20 NOTE — PROGRESS NOTES
Subjective:     Encounter Date:08/21/2020    Patient ID: Luis Gamboa is a 75 y.o.  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      INTERNIST: Anand De Souza MD  VA PHYSICIAN: Kay Cannon MD  COLORECTAL SURGEON: Alfonso Lester MD  IMMUNOLOGIST:  Deysi Smith MD    Chief Complaint:   Chief Complaint   Patient presents with   • Hypertensive heart disease without heart failure     f/u       Problem List:  1. 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 Remote progressive exertional dyspnea/chest pain syndrome with acceptable GXT and borderline abnormal acceptable Holter monitor, April 2009.  b. Recurrent progressive NYHA class III symptoms with abnormal EKG and acceptable chest x-ray with essentially normal right dominant coronary artery and preserved systolic LV dysfunction, LVEF (0.66) with continued medical therapy felt warranted, July 2009.   c. CCS class 0 chest discomfort/NYHA class II-III fatigue symptoms with abnormal monitor demonstrating bradycardia, August 2019.   d. Stress echocardiogram 9/12/2019: Estimated EF = 65%.Left ventricular systolic function is normal.There is no evidence of pericardial effusion.Acceptable negative echocardiographic exercise stress test without anginal type chest discomfort, definitive ischemic EKG changes, or echocardiographic regional wall motion abnormality with preserved systolic left ventricular function following exercise to 85% predicted maximal heart rate and 88% predicted exercise capacity.Overall, current study suggests low probability for significant focal obstructive coronary artery disease with preserved systolic left ventricular function  e. Residual class I symptoms, August 2020  2. Bradycardia with Holter monitor 8/20/2019: Average heart rate was 52 bpm, heart rate less than 50 bpm 68% the time but there were no pauses and only 9 PVCs and 2200 PACs which represent 3% total  heartbeats with no atrial fibrillation  3. Moderate dyslipidemia.   4. Labile hypertension.  5. Mildly overweight, BMI 29.0.  6. Obstructive sleep apnea with intermittent CPAP therapy (did not tolerate).   7. Hiatal hernia/probable GERD, April 2009.  8. Remote left calf DVT/Coumadin therapy - data deficit.   9. Chronic lower tract obstructive symptoms/probable BPH.   10. Crohn’s disease with partial colectomy and colostomy - data deficit.   11. 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   12. Chronic intermittent anxiety/remote depression.   13. Chronic insomnia.   14. Intermittent allergic rhinitis.   15. Abnormal CT abdomen/pelvis demonstrating a 3.2 x 4.2 cm mass in the leftward retroperitoneum with attenuation coefficient consistent with a solid, noncystic mass, September 2019  16. Remote operations:  a. Partial colectomy with colostomy, 2001.   b. Hand surgery secondary to trauma, 2004.  c. Lithotripsy September 2019    Allergies   Allergen Reactions   • Imuran [Azathioprine]       hemorrhagic urticaria.       Current Outpatient Medications:   •  aspirin 81 MG EC tablet, Take 81 mg by mouth Every Other Day., Disp: , Rfl:   •  Budesonide (ENTOCORT EC) 3 MG 24 hr capsule, Take 6 mg by mouth Take As Directed. Every 4th day, Disp: , Rfl:   •  Calcium Carbonate (CALCIUM 600 PO), Take  by mouth Daily., Disp: , Rfl:   •  cholecalciferol (VITAMIN D3) 1000 units tablet, Take 2,000 Units by mouth Daily., Disp: , Rfl:   •  diphenoxylate-atropine (LOMOTIL) 2.5-0.025 MG per tablet, Take 1 tablet by mouth As Needed for diarrhea., Disp: , Rfl:   •  fexofenadine (ALLEGRA) 180 MG tablet, Take 180 mg by mouth Daily., Disp: , Rfl:   •  finasteride (PROSCAR) 5 MG tablet, Take 5 mg by mouth Daily., Disp: , Rfl:   •  glucosamine sulfate 500 MG capsule capsule, Take 1,000 mg by mouth Daily., Disp: , Rfl:   •  immune globulin, human, (HIZENTRA) 1 GM/5ML solution, Inject  under the skin into the appropriate area as  directed. Infusion, Disp: , Rfl:   •  magnesium oxide (MAGOX) 400 (241.3 MG) MG tablet tablet, Take 500 mg by mouth Daily., Disp: , Rfl:   •  montelukast (SINGULAIR) 10 MG tablet, Take 10 mg by mouth Every Night., Disp: , Rfl:   •  Multiple Vitamins-Minerals (MULTIVITAMIN ADULT PO), Take  by mouth Daily., Disp: , Rfl:   •  Omega-3 Fatty Acids (FISH OIL) 1000 MG capsule capsule, Take  by mouth Daily With Breakfast., Disp: , Rfl:   •  omeprazole (priLOSEC) 20 MG capsule, Take 20 mg by mouth Daily., Disp: , Rfl:   •  ondansetron ODT (ZOFRAN-ODT) 4 MG disintegrating tablet, Take 1 tablet by mouth Every 6 (Six) Hours As Needed for Nausea or Vomiting., Disp: 15 tablet, Rfl: 0  •  oxyCODONE-acetaminophen (PERCOCET) 7.5-325 MG per tablet, Take 1 tablet by mouth Every 6 (Six) Hours As Needed for Severe Pain ., Disp: 15 tablet, Rfl: 0  •  probiotic (CULTURELLE) capsule capsule, Take  by mouth Daily., Disp: , Rfl:   •  sertraline (ZOLOFT) 50 MG tablet, Take 50 mg by mouth Daily., Disp: , Rfl:   •  SIMVASTATIN PO, Take 20 mg by mouth., Disp: , Rfl:   •  tamsulosin (FLOMAX) 0.4 MG capsule 24 hr capsule, Take 1 capsule by mouth Daily., Disp: , Rfl:   •  vitamin C (ASCORBIC ACID) 500 MG tablet, Take 500 mg by mouth Daily., Disp: , Rfl:   •  alfuzosin (UROXATRAL) 10 MG 24 hr tablet, Take 10 mg by mouth Daily., Disp: , Rfl:   •  diclofenac (VOLTAREN) 50 MG EC tablet, Take 1 tablet by mouth 2 (Two) Times a Day., Disp: 14 tablet, Rfl: 0    History of Present Illness: Patient returns for follow up after an 11-month hiatus. Since last visit, patient was seen at [facility] for abdominal pain and was found to have a left ureteral stone with an intraabdominal mass measuring 3.2 x 4.2 cm.  At first he was told that this was cancer and he had to go to [facility].  He was told that the mass was between his aorta and his kidney and had spine involvement.  It was decided that the patient will just have repeat scans routinely.  He talked  "to Dr. Lester who did not feel that this was cancer.  He denies any chest pain, shortness of breath, palpitations, dizziness, presyncope, or syncope.  His blood pressure is always around 120/70 at home.  He had an acceptable stress echocardiogram in September 2019. Patient otherwise denies chest pain, shortness of breath, PND, edema, palpitations, syncope or presyncope at this time.        Review of Systems   All other systems reviewed and are negative.     Obtained and negative except as outlined in problem list and HPI.    Procedures       Objective:       Vitals:    08/21/20 1409 08/21/20 1415   BP: 129/72 115/63   BP Location: Right arm Right arm   Patient Position: Sitting Standing   Pulse: 55 60   Temp: 97.7 °F (36.5 °C)    SpO2: 95%    Weight: 91.7 kg (202 lb 3.2 oz)    Height: 177.8 cm (70\")      Body mass index is 29.01 kg/m².  Last weight: 190 lbs    Physical Exam   Constitutional: He is oriented to person, place, and time. He appears well-developed and well-nourished.   Neck: No JVD present. Carotid bruit is not present. No thyromegaly present.   Cardiovascular: Regular rhythm, S1 normal and S2 normal. Exam reveals no gallop, no S3 and no friction rub.   Murmur heard.   Medium-pitched harsh early systolic murmur is present with a grade of 2/6 at the lower left sternal border.  Pulses:       Dorsalis pedis pulses are 1+ on the right side, and 1+ on the left side.        Posterior tibial pulses are 1+ on the right side, and 1+ on the left side.   Pulmonary/Chest: Effort normal and breath sounds normal. He has no wheezes. He has no rhonchi. He has no rales.   Abdominal: Soft. He exhibits no mass. There is no hepatosplenomegaly. There is no tenderness. There is no guarding.   Musculoskeletal: Normal range of motion. He exhibits no edema.   Lymphadenopathy:     He has no cervical adenopathy.   Neurological: He is alert and oriented to person, place, and time.   Skin: Skin is warm, dry and intact. No rash " noted.   Vitals reviewed.        Lab Review:   Lab Results   Component Value Date    GLUCOSE 105 (H) 09/24/2019    BUN 14 09/24/2019    CREATININE 1.26 09/24/2019    EGFRIFNONA 56 (L) 09/24/2019    BCR 11.1 09/24/2019     09/24/2019    K 4.5 09/24/2019     09/24/2019    CO2 30.0 (H) 09/24/2019    CALCIUM 9.0 09/24/2019    ALBUMIN 4.20 09/24/2019    AST 26 09/24/2019    ALT 14 09/24/2019       Lab Results   Component Value Date    WBC 5.95 09/24/2019    HGB 14.3 09/24/2019    HCT 42.7 09/24/2019    MCV 98.2 (H) 09/24/2019     09/24/2019     Stress echocardiogram, 09/12/2019 (reviewed with patient by letter):  · Patient stated no chest pain at baseline and during exercise; BMI 27.3; baseline EKG abnormal with nonspecific ST-T changes and inverted T wave noted.  · Expected exercise time = 6:50 minutes; actual exercise time = 5:54 minutes; RODOLFO +12; 85% of MPHR.  · Patient became very SOA during stage 2 and could not walk any longer with nominal room air oximetry before, during, and after exercise (95-99%).  · No significant ST changes noted.  · Estimated EF = 65%.  · Left ventricular systolic function is normal.  · There is no evidence of pericardial effusion.  · Acceptable negative echocardiographic exercise stress test without anginal type chest discomfort, definitive ischemic EKG changes, or echocardiographic regional wall motion abnormality with preserved systolic left ventricular function following exercise to 85% predicted maximal heart rate and 88% predicted exercise capacity.  · Overall, current study suggests low probability for significant focal obstructive coronary artery disease with preserved systolic left ventricular function.    CT Abdomen, 09/24/2019:  IMPRESSION:  1. There is a 9 mm stone in the proximal left ureter just distal to the ureteropelvic junction producing moderate obstruction of the left kidney. Interestingly the ureter distal to the stone was also dilated but without  evidence of second stone or bladder stone.  2. There is a 3.2 x 4.2 cm mass in the leftward retroperitoneum which has attenuation coefficient consistent with a solid, noncystic mass. There is no prior exam for comparison. This mass would be amenable to percutaneous biopsy.      Assessment:       Overall continued acceptable course with no new interim cardiopulmonary complaints with acceptable functional status. We will defer additional diagnostic or therapeutic intervention from a cardiac perspective at this time. The patient had an acceptable stress echocardiogram in September 2019.     Diagnosis Plan   1. Hypertensive heart disease without heart failure  No recurrent angina pectoris or CHF on current activity schedule; continue current treatment.  Acceptable stress echocardiogram in September 2019.   2. Labile hypertension   Controlled, continue current cardiac medications   3. Dyslipidemia   No new labs to review, continue simvastatin          Plan:         1. Patient to continue current medications and close follow up with the above providers.  2. Tentative cardiology follow up in March 2021 or patient may return sooner PRN.     Scribed for Adria Mayers MD by Corine Newsome, APRN. 8/21/2020  14:41    I, Adria Mayers MD, [facility], personally performed the services described in this documentation as scribed by the above named individual in my presence, and it is both accurate and complete. At 2:45 PM on 08/21/2020

## 2020-08-21 ENCOUNTER — OFFICE VISIT (OUTPATIENT)
Dept: CARDIOLOGY | Facility: CLINIC | Age: 75
End: 2020-08-21

## 2020-08-21 VITALS
OXYGEN SATURATION: 95 % | HEIGHT: 70 IN | WEIGHT: 202.2 LBS | BODY MASS INDEX: 28.95 KG/M2 | SYSTOLIC BLOOD PRESSURE: 115 MMHG | TEMPERATURE: 97.7 F | HEART RATE: 60 BPM | DIASTOLIC BLOOD PRESSURE: 63 MMHG

## 2020-08-21 DIAGNOSIS — R09.89 LABILE HYPERTENSION: ICD-10-CM

## 2020-08-21 DIAGNOSIS — E78.5 DYSLIPIDEMIA: ICD-10-CM

## 2020-08-21 DIAGNOSIS — I11.9 HYPERTENSIVE HEART DISEASE WITHOUT HEART FAILURE: Primary | ICD-10-CM

## 2020-08-21 PROCEDURE — 99214 OFFICE O/P EST MOD 30 MIN: CPT | Performed by: INTERNAL MEDICINE

## 2020-08-21 RX ORDER — TAMSULOSIN HYDROCHLORIDE 0.4 MG/1
1 CAPSULE ORAL DAILY
COMMUNITY

## 2020-08-21 RX ORDER — LACTOBACILLUS RHAMNOSUS GG 10B CELL
CAPSULE ORAL DAILY
COMMUNITY

## 2021-03-26 ENCOUNTER — OFFICE VISIT (OUTPATIENT)
Dept: CARDIOLOGY | Facility: CLINIC | Age: 76
End: 2021-03-26

## 2021-03-26 VITALS
HEART RATE: 56 BPM | WEIGHT: 203 LBS | TEMPERATURE: 96.9 F | SYSTOLIC BLOOD PRESSURE: 157 MMHG | HEIGHT: 70 IN | BODY MASS INDEX: 29.06 KG/M2 | DIASTOLIC BLOOD PRESSURE: 77 MMHG

## 2021-03-26 DIAGNOSIS — E78.5 DYSLIPIDEMIA: ICD-10-CM

## 2021-03-26 DIAGNOSIS — I11.9 HYPERTENSIVE HEART DISEASE WITHOUT HEART FAILURE: Primary | ICD-10-CM

## 2021-03-26 DIAGNOSIS — R09.89 LABILE HYPERTENSION: ICD-10-CM

## 2021-03-26 PROCEDURE — 99214 OFFICE O/P EST MOD 30 MIN: CPT | Performed by: INTERNAL MEDICINE

## 2021-03-26 NOTE — PROGRESS NOTES
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      INTERNIST: Anand De Souza MD  VA PHYSICIAN: Kay Cannon MD  COLORECTAL SURGEON: Alfonso Lester MD  IMMUNOLOGIST:  Deysi Smith MD    Chief Complaint:   Chief Complaint   Patient presents with   • Hypertensive heart disease without heart failure       Problem List:  1. 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 Remote progressive exertional dyspnea/chest pain syndrome with acceptable GXT and borderline abnormal acceptable Holter monitor, April 2009.  b. Recurrent progressive NYHA class III symptoms with abnormal EKG and acceptable chest x-ray with essentially normal right dominant coronary artery and preserved systolic LV dysfunction, LVEF (0.66) with continued medical therapy felt warranted, July 2009.   c. CCS class 0 chest discomfort/NYHA class II-III fatigue symptoms with abnormal monitor demonstrating bradycardia, August 2019.   d. Stress echocardiogram 9/12/2019: Estimated EF = 65%.Left ventricular systolic function is normal.There is no evidence of pericardial effusion.Acceptable negative echocardiographic exercise stress test without anginal type chest discomfort, definitive ischemic EKG changes, or echocardiographic regional wall motion abnormality with preserved systolic left ventricular function following exercise to 85% predicted maximal heart rate and 88% predicted exercise capacity.Overall, current study suggests low probability for significant focal obstructive coronary artery disease with preserved systolic left ventricular function  e. Residual class I symptoms, August 2020, March 2021  2. Bradycardia with Holter monitor 8/20/2019: Average heart rate was 52 bpm, heart rate less than 50 bpm 68% the time but there were no pauses and only 9 PVCs and 2200 PACs which represent 3% total  heartbeats with no atrial fibrillation  3. Moderate dyslipidemia.   4. Labile hypertension.  5. Mildly overweight, BMI 29.13.  6. Obstructive sleep apnea with intermittent CPAP therapy (did not tolerate).   7. Hiatal hernia/probable GERD, April 2009.  8. Remote left calf DVT/Coumadin therapy - data deficit.   9. Chronic lower tract obstructive symptoms/probable BPH.   10. Crohn’s disease with partial colectomy and colostomy - data deficit.   11. 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   12. Chronic intermittent anxiety/remote depression.   13. Chronic insomnia.   14. Intermittent allergic rhinitis.   15. Abnormal CT abdomen/pelvis demonstrating a 3.2 x 4.2 cm mass in the leftward retroperitoneum with attenuation coefficient consistent with a solid, noncystic mass, September 2019  16. Remote operations:  a. Partial colectomy with colostomy, 2001.   b. Hand surgery secondary to trauma, 2004.  c. Lithotripsy September 2019    Allergies   Allergen Reactions   • Imuran [Azathioprine]       hemorrhagic urticaria.       Current Outpatient Medications:   •  alfuzosin (UROXATRAL) 10 MG 24 hr tablet, Take 10 mg by mouth Daily., Disp: , Rfl:   •  aspirin 81 MG EC tablet, Take 81 mg by mouth Every Other Day., Disp: , Rfl:   •  Budesonide (ENTOCORT EC) 3 MG 24 hr capsule, Take 6 mg by mouth Take As Directed. Every 4th day, Disp: , Rfl:   •  Calcium Carbonate (CALCIUM 600 PO), Take  by mouth Daily., Disp: , Rfl:   •  cholecalciferol (VITAMIN D3) 1000 units tablet, Take 2,000 Units by mouth Daily., Disp: , Rfl:   •  diclofenac (VOLTAREN) 50 MG EC tablet, Take 1 tablet by mouth 2 (Two) Times a Day., Disp: 14 tablet, Rfl: 0  •  diphenoxylate-atropine (LOMOTIL) 2.5-0.025 MG per tablet, Take 1 tablet by mouth As Needed for diarrhea., Disp: , Rfl:   •  fexofenadine (ALLEGRA) 180 MG tablet, Take 180 mg by mouth Daily., Disp: , Rfl:   •  finasteride (PROSCAR) 5 MG tablet, Take 5 mg by mouth Daily., Disp: , Rfl:   •   glucosamine sulfate 500 MG capsule capsule, Take 1,000 mg by mouth Daily., Disp: , Rfl:   •  immune globulin, human, (HIZENTRA) 1 GM/5ML solution, Inject  under the skin into the appropriate area as directed. Infusion, Disp: , Rfl:   •  magnesium oxide (MAGOX) 400 (241.3 MG) MG tablet tablet, Take 500 mg by mouth Daily., Disp: , Rfl:   •  montelukast (SINGULAIR) 10 MG tablet, Take 10 mg by mouth Every Night., Disp: , Rfl:   •  Multiple Vitamins-Minerals (MULTIVITAMIN ADULT PO), Take  by mouth Daily., Disp: , Rfl:   •  Omega-3 Fatty Acids (FISH OIL) 1000 MG capsule capsule, Take  by mouth Daily With Breakfast., Disp: , Rfl:   •  omeprazole (priLOSEC) 20 MG capsule, Take 20 mg by mouth Daily., Disp: , Rfl:   •  ondansetron ODT (ZOFRAN-ODT) 4 MG disintegrating tablet, Take 1 tablet by mouth Every 6 (Six) Hours As Needed for Nausea or Vomiting., Disp: 15 tablet, Rfl: 0  •  oxyCODONE-acetaminophen (PERCOCET) 7.5-325 MG per tablet, Take 1 tablet by mouth Every 6 (Six) Hours As Needed for Severe Pain ., Disp: 15 tablet, Rfl: 0  •  probiotic (CULTURELLE) capsule capsule, Take  by mouth Daily., Disp: , Rfl:   •  sertraline (ZOLOFT) 50 MG tablet, Take 50 mg by mouth Daily., Disp: , Rfl:   •  SIMVASTATIN PO, Take 20 mg by mouth., Disp: , Rfl:   •  tamsulosin (FLOMAX) 0.4 MG capsule 24 hr capsule, Take 1 capsule by mouth Daily., Disp: , Rfl:   •  vitamin C (ASCORBIC ACID) 500 MG tablet, Take 500 mg by mouth Daily., Disp: , Rfl:     History of Present Illness: Patient returns for scheduled 7-month follow up. He denies any chest pain, shortness of breath, palpitations, dizziness, presyncope, syncope, or edema.  He has had both of his Pfizer Covid vaccinations.  He is going soon to the [facility] to have laboratory testing and will try and have this sent to us for review.  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  Last summer he had an episode where he became dehydrated and had  "to go to the hospital so he tries to drink Gatorade and water more frequently now.  His blood pressure at home is normally around 120/70. Patient has had no interim ER visits, hospitalizations, serious illnesses, or surgeries.      Review of Systems   All other systems reviewed and are negative.     Obtained and negative except as outlined in problem list and HPI.    Procedures       Objective:       Vitals:    03/26/21 1429 03/26/21 1431   BP: 146/75 157/77   BP Location: Right arm Right arm   Patient Position: Sitting Standing   Pulse: 56 56   Temp: 96.9 °F (36.1 °C)    Weight: 92.1 kg (203 lb)    Height: 177.8 cm (70\")    Recheck blood pressure right arm sitting was 122/64  Body mass index is 29.13 kg/m².  Last weight: 202 lbs    Vitals reviewed.   Constitutional:       Appearance: Well-developed.   Neck:      Thyroid: No thyromegaly.      Vascular: No carotid bruit or JVD.      Lymphadenopathy: No cervical adenopathy.   Pulmonary:      Effort: Pulmonary effort is normal.      Breath sounds: Normal breath sounds. No wheezing. No rhonchi. No rales.   Cardiovascular:      Regular rhythm.      Murmurs: There is a grade 2/6 mid frequency harsh midsystolic murmur at the LLSB.      No gallop. No S3 gallop.   Pulses:     Dorsalis pedis: 1+ bilaterally.     Posterior tibial: 1+ bilaterally.  Edema:     Peripheral edema absent.   Abdominal:      Palpations: Abdomen is soft. There is no abdominal mass.      Tenderness: There is no abdominal tenderness. There is no guarding.   Musculoskeletal: Normal range of motion. Skin:     General: Skin is warm and dry.      Findings: No rash.   Neurological:      Mental Status: Alert and oriented to person, place, and time.           Lab Review:     No recent laboratory studies available for review today.      Advance Care Planning   ACP discussion was held with the patient during this visit. Patient does not have an advance directive, declines further assistance.  Assessment:     "   Overall continued acceptable course with no new interim cardiopulmonary complaints with acceptable functional status. We will defer additional diagnostic or therapeutic intervention from a cardiac perspective at this time.  Hopefully we will get to review the patient's next laboratory testing results.     Diagnosis Plan   1. Hypertensive heart disease without heart failure  No recurrent angina pectoris or CHF on current activity schedule; continue current treatment   2. Labile hypertension  Controlled, continue current cardiac medications   3. Dyslipidemia  No new labs to review, continue simvastatin          Plan:         1. Patient to continue current medications and close follow up with the above providers.  2. Tentative cardiology follow up in September 2021 or patient may return sooner PRN.    Scribed for Adria Mayers MD by Corine Newsome, APRN. 3/26/2021  14:39 EDT    I, Adria Mayers MD, FACC, personally performed the services described in this documentation as scribed by the above named individual in my presence, and it is both accurate and complete. At 15:06 EDT on 03/26/2021

## 2021-10-01 ENCOUNTER — OFFICE VISIT (OUTPATIENT)
Dept: CARDIOLOGY | Facility: CLINIC | Age: 76
End: 2021-10-01

## 2021-10-01 VITALS
BODY MASS INDEX: 28.14 KG/M2 | HEIGHT: 71 IN | OXYGEN SATURATION: 98 % | DIASTOLIC BLOOD PRESSURE: 83 MMHG | SYSTOLIC BLOOD PRESSURE: 174 MMHG | WEIGHT: 201 LBS | HEART RATE: 51 BPM

## 2021-10-01 DIAGNOSIS — I11.9 HYPERTENSIVE HEART DISEASE WITHOUT HEART FAILURE: Primary | ICD-10-CM

## 2021-10-01 DIAGNOSIS — E78.5 DYSLIPIDEMIA: ICD-10-CM

## 2021-10-01 DIAGNOSIS — R09.89 LABILE HYPERTENSION: ICD-10-CM

## 2021-10-01 PROCEDURE — 99214 OFFICE O/P EST MOD 30 MIN: CPT | Performed by: INTERNAL MEDICINE

## 2021-10-01 RX ORDER — AMLODIPINE BESYLATE 5 MG/1
5 TABLET ORAL DAILY
Qty: 90 TABLET | Refills: 3 | Status: SHIPPED | OUTPATIENT
Start: 2021-10-01

## 2021-10-01 RX ORDER — AMLODIPINE BESYLATE 5 MG/1
5 TABLET ORAL DAILY
Qty: 90 TABLET | Refills: 3 | Status: SHIPPED | OUTPATIENT
Start: 2021-10-01 | End: 2021-10-01

## 2021-10-01 NOTE — PROGRESS NOTES
Subjective:     Encounter Date:10/01/2021    Patient ID: Luis Gamboa is a 76 y.o.  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      INTERNIST: Anand De Souza MD  [facility] PH[facility]: Kay Cannon MD  COLORECTAL SURGEON: Alfonso Lester MD  IMMUNOLOGIST:  Deysi Smith MD    Chief Complaint:   Chief Complaint   Patient presents with   • Hypertensive heart disease without heart failure       Problem List:  1. 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 Remote progressive exertional dyspnea/chest pain syndrome with acceptable GXT and borderline abnormal acceptable Holter monitor, April 2009.  b. Recurrent progressive NYHA class III symptoms with abnormal EKG and acceptable chest x-ray with essentially normal right dominant coronary artery and preserved systolic LV dysfunction, LVEF (0.66) with continued medical therapy felt warranted, July 2009.   c. CCS class 0 chest discomfort/NYHA class II-III fatigue symptoms with abnormal monitor demonstrating bradycardia, August 2019.   d. Stress echocardiogram 9/12/2019: Estimated EF = 65%.Left ventricular systolic function is normal.There is no evidence of pericardial effusion.Acceptable negative echocardiographic exercise stress test without anginal type chest discomfort, definitive ischemic EKG changes, or echocardiographic regional wall motion abnormality with preserved systolic left ventricular function following exercise to 85% predicted maximal heart rate and 88% predicted exercise capacity.Overall, current study suggests low probability for significant focal obstructive coronary artery disease with preserved systolic left ventricular function  e. Residual class I symptoms, August 2020, March 2021, October 2021  2. Bradycardia with Holter monitor 8/20/2019: Average heart rate was 52 bpm, heart rate less than 50 bpm 68% the time but there were no pauses and only 9 PVCs and 2200 PACs which  represent 3% total heartbeats with no atrial fibrillation  3. Moderate dyslipidemia.   4. Labile hypertension.  5. Mildly overweight, BMI 29.13.  6. Obstructive sleep apnea with intermittent CPAP therapy (did not tolerate).   7. Hiatal hernia/probable GERD, April 2009.  8. Remote left calf DVT/Coumadin therapy - data deficit.   9. Chronic lower tract obstructive symptoms/probable BPH.   10. Crohn’s disease with partial colectomy and colostomy - data deficit.   11. 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   12. Chronic intermittent anxiety/remote depression.   13. Chronic insomnia.   14. Intermittent allergic rhinitis.   15. Abnormal CT abdomen/pelvis demonstrating a 3.2 x 4.2 cm mass in the leftward retroperitoneum with attenuation coefficient consistent with a solid, noncystic mass, September 2019  16. Remote operations:  a. Partial colectomy with colostomy, 2001.   b. Hand surgery secondary to trauma, 2004.  c. Lithotripsy September 2019    Allergies   Allergen Reactions   • Imuran [Azathioprine]       hemorrhagic urticaria.       Current Outpatient Medications:   •  alfuzosin (UROXATRAL) 10 MG 24 hr tablet, Take 10 mg by mouth Daily., Disp: , Rfl:   •  aspirin 81 MG EC tablet, Take 81 mg by mouth Every Other Day., Disp: , Rfl:   •  Budesonide (ENTOCORT EC) 3 MG 24 hr capsule, Take 6 mg by mouth Take As Directed. Every 4th day, Disp: , Rfl:   •  Calcium Carbonate (CALCIUM 600 PO), Take  by mouth Daily., Disp: , Rfl:   •  cholecalciferol (VITAMIN D3) 1000 units tablet, Take 2,000 Units by mouth Daily., Disp: , Rfl:   •  diclofenac (VOLTAREN) 50 MG EC tablet, Take 1 tablet by mouth 2 (Two) Times a Day., Disp: 14 tablet, Rfl: 0  •  diphenoxylate-atropine (LOMOTIL) 2.5-0.025 MG per tablet, Take 1 tablet by mouth As Needed for diarrhea., Disp: , Rfl:   •  fexofenadine (ALLEGRA) 180 MG tablet, Take 180 mg by mouth Daily., Disp: , Rfl:   •  finasteride (PROSCAR) 5 MG tablet, Take 5 mg by mouth Daily.,  Disp: , Rfl:   •  glucosamine sulfate 500 MG capsule capsule, Take 1,000 mg by mouth Daily., Disp: , Rfl:   •  immune globulin, human, (HIZENTRA) 1 GM/5ML solution, Inject  under the skin into the appropriate area as directed. Infusion, Disp: , Rfl:   •  magnesium oxide (MAGOX) 400 (241.3 MG) MG tablet tablet, Take 500 mg by mouth Daily., Disp: , Rfl:   •  montelukast (SINGULAIR) 10 MG tablet, Take 10 mg by mouth Every Night., Disp: , Rfl:   •  Multiple Vitamins-Minerals (MULTIVITAMIN ADULT PO), Take  by mouth Daily., Disp: , Rfl:   •  Omega-3 Fatty Acids (FISH OIL) 1000 MG capsule capsule, Take  by mouth Daily With Breakfast., Disp: , Rfl:   •  omeprazole (priLOSEC) 20 MG capsule, Take 20 mg by mouth Daily., Disp: , Rfl:   •  ondansetron ODT (ZOFRAN-ODT) 4 MG disintegrating tablet, Take 1 tablet by mouth Every 6 (Six) Hours As Needed for Nausea or Vomiting., Disp: 15 tablet, Rfl: 0  •  oxyCODONE-acetaminophen (PERCOCET) 7.5-325 MG per tablet, Take 1 tablet by mouth Every 6 (Six) Hours As Needed for Severe Pain ., Disp: 15 tablet, Rfl: 0  •  probiotic (CULTURELLE) capsule capsule, Take  by mouth Daily., Disp: , Rfl:   •  sertraline (ZOLOFT) 50 MG tablet, Take 50 mg by mouth Daily., Disp: , Rfl:   •  SIMVASTATIN PO, Take 20 mg by mouth., Disp: , Rfl:   •  tamsulosin (FLOMAX) 0.4 MG capsule 24 hr capsule, Take 1 capsule by mouth Daily., Disp: , Rfl:   •  vitamin C (ASCORBIC ACID) 500 MG tablet, Take 500 mg by mouth Daily., Disp: , Rfl:     History of Present Illness: Patient returns for scheduled 6-month follow up.  He denies any chest pain, shortness of breath, palpitations, dizziness, presyncope, or syncope.  He had laboratory testing last week and has not gotten the results of this yet.  [facility] that he may have atrial fibrillation.  The patient has never had a history of atrial fibrillation in the past and denied any heart fluttering or symptoms at that time.  An ECG  "was not obtained.  The patient does a lot of farm work and denies any cardiopulmonary complaints when he is doing his activities. 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 His son-in-law passed away last month from Covid. He recently had a Crohn's flareup.  He states that every fall he generally has this happen and is followed by Dr. Lester.  He has had both his COVID vaccinations. Patient has had no additional interim ER visits, hospitalizations, serious illnesses, or surgeries.    Review of Systems   All other systems reviewed and are negative.     Obtained and negative except as outlined in problem list and HPI.    Procedures       Objective:       Vitals:    10/01/21 1438 10/01/21 1440   BP: 168/84 174/83   BP Location: Right arm Right arm   Patient Position: Sitting Standing   Pulse: (!) 48 51   SpO2: 98%    Weight: 91.2 kg (201 lb)    Height: 179.1 cm (70.5\")    Recheck blood pressure right arm sitting was 150/82  Body mass index is 28.43 kg/m².  Last weight: 203 lbs    Vitals reviewed.   Constitutional:       Appearance: Well-developed.   Neck:      Thyroid: No thyromegaly.      Vascular: No carotid bruit or JVD.      Lymphadenopathy: No cervical adenopathy.   Pulmonary:      Effort: Pulmonary effort is normal.      Breath sounds: Normal breath sounds. No wheezing. No rhonchi. No rales.   Cardiovascular:      Bradycardia present. Regular rhythm.      Murmurs: There is a grade 2/6 mid frequency harsh early systolic murmur at the LLSB.      No gallop. No S3 gallop.   Pulses:     Dorsalis pedis: 2+ bilaterally.     Posterior tibial: 2+ bilaterally.  Edema:     Peripheral edema absent.   Abdominal:      Palpations: Abdomen is soft. There is no abdominal mass.      Tenderness: There is no abdominal tenderness.   Musculoskeletal: Normal range of motion. Skin:     General: Skin is warm and dry.      Findings: No rash.   Neurological:      Mental Status: Alert and oriented to person, place, and time. "           Lab Review:     4/1/2021 (reviewed with patient by letter - continue current treatment):  • CBC: hematocrit 44%, hemoglobin 14.9, WBC 3600 and normal indices, platelet count and differential  • CMP: normal electrolytes with acceptable kidney function, serum creatinine 1.2, BUN 12, glucose 95 and normal serum calcium and liver function tests.   • FLP: total cholesterol 147, triglycerides 90, HDL-C 59, LDL-C 88        Assessment:       Overall continued acceptable course with no new interim cardiopulmonary complaints with acceptable functional status. We will defer additional diagnostic or therapeutic intervention from a cardiac perspective at this time.  The patient's blood pressure is uncontrolled so we will add amlodipine 5 mg daily.     Diagnosis Plan   1. Hypertensive heart disease without heart failure  No recurrent angina pectoris or CHF on current activity schedule; continue current treatment   2. Labile hypertension  Amlodipine 5 mg daily   3. Dyslipidemia  Acceptable lipid panel April 2021, continue simvastatin          Plan:         1. Patient to continue current medications and close follow up with the above providers.  2. Tentative cardiology follow up in April 2022 or patient may return sooner PRN.  3. Amlodipine 5 mg daily    Scribed for Adria Mayers MD by Corine Newsome, APRN. 10/1/2021  14:48 EDT    I, Adria Mayers MD, Merged with [facility], personally performed the services described in this documentation as scribed by the above named individual in my presence, and it is both accurate and complete. At 15:41 EDT on 10/01/2021

## 2022-04-06 ENCOUNTER — OFFICE VISIT (OUTPATIENT)
Dept: CARDIOLOGY | Facility: CLINIC | Age: 77
End: 2022-04-06

## 2022-04-06 VITALS
SYSTOLIC BLOOD PRESSURE: 146 MMHG | WEIGHT: 202 LBS | DIASTOLIC BLOOD PRESSURE: 78 MMHG | BODY MASS INDEX: 28.28 KG/M2 | OXYGEN SATURATION: 97 % | HEIGHT: 71 IN | HEART RATE: 71 BPM

## 2022-04-06 DIAGNOSIS — I11.9 HYPERTENSIVE HEART DISEASE WITHOUT HEART FAILURE: Primary | ICD-10-CM

## 2022-04-06 DIAGNOSIS — R09.89 LABILE HYPERTENSION: ICD-10-CM

## 2022-04-06 DIAGNOSIS — E78.5 DYSLIPIDEMIA: ICD-10-CM

## 2022-04-06 PROCEDURE — 99214 OFFICE O/P EST MOD 30 MIN: CPT | Performed by: INTERNAL MEDICINE

## 2022-04-06 RX ORDER — TRAZODONE HYDROCHLORIDE 50 MG/1
1 TABLET ORAL AS NEEDED
COMMUNITY
Start: 2021-12-13 | End: 2022-11-10

## 2022-04-06 RX ORDER — LATANOPROST 50 UG/ML
1 SOLUTION/ DROPS OPHTHALMIC NIGHTLY
COMMUNITY
Start: 2022-01-25

## 2022-04-06 NOTE — PROGRESS NOTES
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      INTERNIST: Anand De Souza MD  [facility] PHYSICIAN: Kay Cannon MD  COLORECTAL SURGEON: Alfonso Lester MD  IMMUNOLOGIST:  Deysi Smith MD    Chief Complaint:   Chief Complaint   Patient presents with   • Hypertensive heart disease without heart failure       Problem List:  1. 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 Remote progressive exertional dyspnea/chest pain syndrome with acceptable GXT and borderline abnormal acceptable Holter monitor, April 2009.  b. Recurrent progressive NYHA class III symptoms with abnormal EKG and acceptable chest x-ray with essentially normal right dominant coronary artery and preserved systolic LV dysfunction, LVEF (0.66) with continued medical therapy felt warranted, July 2009.   c. CCS class 0 chest discomfort/NYHA class II-III fatigue symptoms with abnormal monitor demonstrating bradycardia, August 2019.   d. Stress echocardiogram 9/12/2019: Estimated EF = 65%.Left ventricular systolic function is normal.There is no evidence of pericardial effusion.Acceptable negative echocardiographic exercise stress test without anginal type chest discomfort, definitive ischemic EKG changes, or echocardiographic regional wall motion abnormality with preserved systolic left ventricular function following exercise to 85% predicted maximal heart rate and 88% predicted exercise capacity.Overall, current study suggests low probability for significant focal obstructive coronary artery disease with preserved systolic left ventricular function  e. Residual class I symptoms, August 2020, March 2021, October 2021  2. Bradycardia with Holter monitor 8/20/2019: Average heart rate was 52 bpm, heart rate less than 50 bpm 68% the time but there were no pauses and only 9 PVCs and 2200 PACs which  represent 3% total heartbeats with no atrial fibrillation  3. Moderate dyslipidemia.   4. Labile hypertension.  5. Mildly overweight, BMI 29.13.  6. Obstructive sleep apnea with intermittent CPAP therapy (did not tolerate).   7. Hiatal hernia/probable GERD, April 2009.  8. Remote left calf DVT/Coumadin therapy - data deficit.   9. Chronic lower tract obstructive symptoms/probable BPH.   10. Crohn’s disease with partial colectomy and colostomy - data deficit.   11. 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   12. Chronic intermittent anxiety/remote depression.   13. Chronic insomnia.   14. Intermittent allergic rhinitis.   15. Abnormal CT abdomen/pelvis demonstrating a 3.2 x 4.2 cm mass in the leftward retroperitoneum with attenuation coefficient consistent with a solid, noncystic mass, September 2019  16. Remote operations:  a. Partial colectomy with colostomy, 2001.   b. Hand surgery secondary to trauma, 2004.  c. Lithotripsy September 2019    Allergies   Allergen Reactions   • Imuran [Azathioprine]       hemorrhagic urticaria.       Current Outpatient Medications:   •  alfuzosin (UROXATRAL) 10 MG 24 hr tablet, Take 10 mg by mouth Daily., Disp: , Rfl:   •  amLODIPine (NORVASC) 5 MG tablet, Take 1 tablet by mouth Daily., Disp: 90 tablet, Rfl: 3  •  aspirin 81 MG EC tablet, Take 81 mg by mouth Every Other Day., Disp: , Rfl:   •  Budesonide (ENTOCORT EC) 3 MG 24 hr capsule, Take 6 mg by mouth Take As Directed. Every 4th day, Disp: , Rfl:   •  Calcium Carbonate (CALCIUM 600 PO), Take  by mouth Daily., Disp: , Rfl:   •  cholecalciferol (VITAMIN D3) 1000 units tablet, Take 2,000 Units by mouth Daily., Disp: , Rfl:   •  diclofenac (VOLTAREN) 50 MG EC tablet, Take 1 tablet by mouth 2 (Two) Times a Day., Disp: 14 tablet, Rfl: 0  •  diphenoxylate-atropine (LOMOTIL) 2.5-0.025 MG per tablet, Take 1 tablet by mouth As Needed for diarrhea., Disp: , Rfl:   •  fexofenadine (ALLEGRA) 180 MG tablet, Take 180 mg by  mouth Daily., Disp: , Rfl:   •  finasteride (PROSCAR) 5 MG tablet, Take 5 mg by mouth Daily., Disp: , Rfl:   •  glucosamine sulfate 500 MG capsule capsule, Take 1,000 mg by mouth Daily., Disp: , Rfl:   •  immune globulin, human, 1 GM/5ML solution, Inject  under the skin into the appropriate area as directed 1 (One) Time Per Week. Infusion, Disp: , Rfl:   •  latanoprost (XALATAN) 0.005 % ophthalmic solution, Administer 1 drop to both eyes Every Night., Disp: , Rfl:   •  magnesium oxide (MAGOX) 400 (241.3 MG) MG tablet tablet, Take 500 mg by mouth Daily., Disp: , Rfl:   •  Multiple Vitamins-Minerals (MULTIVITAMIN ADULT PO), Take  by mouth Daily., Disp: , Rfl:   •  Omega-3 Fatty Acids (FISH OIL) 1000 MG capsule capsule, Take  by mouth Daily With Breakfast., Disp: , Rfl:   •  omeprazole (priLOSEC) 20 MG capsule, Take 20 mg by mouth Daily., Disp: , Rfl:   •  ondansetron ODT (ZOFRAN-ODT) 4 MG disintegrating tablet, Take 1 tablet by mouth Every 6 (Six) Hours As Needed for Nausea or Vomiting., Disp: 15 tablet, Rfl: 0  •  probiotic (CULTURELLE) capsule capsule, Take  by mouth Daily., Disp: , Rfl:   •  sertraline (ZOLOFT) 50 MG tablet, Take 50 mg by mouth Daily., Disp: , Rfl:   •  SIMVASTATIN PO, Take 20 mg by mouth Daily., Disp: , Rfl:   •  tamsulosin (FLOMAX) 0.4 MG capsule 24 hr capsule, Take 1 capsule by mouth Daily., Disp: , Rfl:   •  traZODone (DESYREL) 50 MG tablet, Take 1 tablet by mouth As Needed., Disp: , Rfl:   •  vitamin C (ASCORBIC ACID) 500 MG tablet, Take 500 mg by mouth Daily., Disp: , Rfl:   •  montelukast (SINGULAIR) 10 MG tablet, Take 10 mg by mouth Every Night., Disp: , Rfl:   •  oxyCODONE-acetaminophen (PERCOCET) 7.5-325 MG per tablet, Take 1 tablet by mouth Every 6 (Six) Hours As Needed for Severe Pain ., Disp: 15 tablet, Rfl: 0    History of Present Illness: Patient returns for scheduled 6-month follow up. Patient reports that since he received the second COVID vaccine in February 2021, he has been  "having diarrhea and felt generally unwell. He also reports that his diarrhea is starting to get better. He believes that he may have had a breakthrough case of COVID, but was not tested. Patient denies chest pain, shortness of breath, palpitations, edema, dizziness, and syncope.  He reports that his white blood cell count has been low, but he states that it's \"always low because of this Crohn's.\" He reports that he takes Imodium for his diarrhea. Patient reports that he is scheduled to have a colonoscopy next year-data deficit. He reports that he doesn't have much of an appetite, but that he does try to eat when he remembers to. He has had no additional interim ER visits, hospitalizations, serious illnesses, or surgeries.          ROS   Obtained and negative except as outlined in problem list and HPI.    Procedures       Objective:       Vitals:    04/06/22 1452 04/06/22 1454   BP: 134/84 146/78   BP Location: Right arm Right arm   Patient Position: Sitting Standing   Cuff Size: Adult Adult   Pulse: 70 71   SpO2: 97%    Weight: 91.6 kg (202 lb)    Height: 179.1 cm (70.5\")      Body mass index is 28.57 kg/m².  Last weight: 201 lbs    Vitals reviewed.   Constitutional:       Appearance: Well-developed.   Neck:      Thyroid: No thyromegaly.      Vascular: No carotid bruit or JVD.      Lymphadenopathy: No cervical adenopathy.   Pulmonary:      Effort: Pulmonary effort is normal.      Breath sounds: Normal breath sounds. No wheezing. No rhonchi. No rales.   Cardiovascular:      Regular rhythm.      Murmurs: There is a grade 1/6 mid frequency midsystolic murmur at the URSB.      No gallop. No S3 gallop.   Pulses:     Dorsalis pedis: 2+ bilaterally.     Posterior tibial: 2+ bilaterally.  Edema:     Peripheral edema absent.   Abdominal:      Palpations: Abdomen is soft. There is no abdominal mass.      Tenderness: There is no abdominal tenderness.   Musculoskeletal: Normal range of motion. Skin:     General: Skin is warm " and dry.      Findings: No rash.   Neurological:      Mental Status: Alert and oriented to person, place, and time.           Lab Review:     Lab date: 4/1/2021 (reviewed with patient via letter, recommended no changes to treatment)  • FLP: , TG 90, HDL 59, LDL 88  • CMP: Glu 95, BUN 12, Creat 1.2  • CBC: WBC 36, HGB 14.9, HCT 44,   • B12: 736           Assessment:       Overall continued acceptable course with no new interim cardiopulmonary complaints with good functional status. We will defer additional diagnostic or therapeutic intervention from a cardiac perspective at this time. I am perplexed by his intractable diarrhea, and he may need a second opinion from a gastroenterologist. He states that he has had extensive, very comprehensive evaluations by Tasneem De Souza and [facility].     Diagnosis Plan   1. Hypertensive heart disease without heart failure  Stable and asymptomatic. Continue current treatment.   2. Labile hypertension  Controlled. Continue current treatment.   3. Dyslipidemia  Adequate lipid panel April 2021. Continue simvastatin.          Plan:         1. Patient is encouraged to implement a regular aerobic exercise routine, at least 30 minutes daily, 4-5 days per week.  2. Patient to continue current medications and close follow up with the above providers.  3. Tentative cardiology follow up in October 2022 or patient may return sooner PRN.    Scribed for Adria Mayers MD by Fatou Sheth. 4/6/2022  15:04 EDT    I, Adria Mayers MD, [facility], personally performed the services described in this documentation as scribed by the above named individual in my presence, and it is both accurate and complete. At 15:19 EDT on 04/06/2022

## 2022-11-04 NOTE — PROGRESS NOTES
Subjective:     Encounter Date:11/10/2022      Patient ID: Luis Gamboa is a 77 y.o.  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      INTERNIST: Anand De Souza MD  VA PHYSICIAN: Kay Cannon MD  COLORECTAL SURGEON: Alfonso Lester MD  IMMUNOLOGIST:  Deysi Smith MD .    Chief Complaint:   Chief Complaint   Patient presents with   • Hypertensive heart disease without heart failure     Problem List:  1. 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 Remote progressive exertional dyspnea/chest pain syndrome with acceptable GXT and borderline abnormal acceptable Holter monitor, April 2009.  b. Recurrent progressive NYHA class III symptoms with abnormal EKG and acceptable chest x-ray with essentially normal right dominant coronary artery and preserved systolic LV dysfunction, LVEF (0.66) with continued medical therapy felt warranted, July 2009.   c. CCS class 0 chest discomfort/NYHA class II-III fatigue symptoms with abnormal monitor demonstrating bradycardia, August 2019.   d. Stress echocardiogram 9/12/2019: Estimated EF = 65%.Left ventricular systolic function is normal.There is no evidence of pericardial effusion.Acceptable negative echocardiographic exercise stress test without anginal type chest discomfort, definitive ischemic EKG changes, or echocardiographic regional wall motion abnormality with preserved systolic left ventricular function following exercise to 85% predicted maximal heart rate and 88% predicted exercise capacity.Overall, current study suggests low probability for significant focal obstructive coronary artery disease with preserved systolic left ventricular function  e. Residual class I symptoms, August 2020, March 2021, October 2021, November 2022  2. Bradycardia with Holter monitor 8/20/2019: Average heart rate was 52 bpm, heart rate less than 50 bpm 68% the time but there were no pauses and only 9 PVCs and  2200 PACs which represent 3% total heartbeats with no atrial fibrillation  3. Moderate dyslipidemia.   4. Labile hypertension.  5. Mildly overweight, BMI 28.15.  6. Obstructive sleep apnea with intermittent CPAP therapy (did not tolerate).   7. Hiatal hernia/probable GERD, April 2009.  8. Remote left calf DVT/Coumadin therapy - data deficit.   9. Chronic lower tract obstructive symptoms/probable BPH.   10. Crohn’s disease with partial colectomy and colostomy - data deficit.   11. 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   12. Chronic intermittent anxiety/remote depression.   13. Chronic insomnia.   14. Intermittent allergic rhinitis.   15. Abnormal CT abdomen/pelvis demonstrating a 3.2 x 4.2 cm mass in the leftward retroperitoneum with attenuation coefficient consistent with a solid, noncystic mass, September 2019  16. Paroxysmal atrial fibrillation, diagnosed November 2022 in office, asymptomatic, [facility], started Eliquis  17. Remote operations:  a. Partial colectomy with colostomy, 2001.   b. Hand surgery secondary to trauma, 2004.  c. Lithotripsy September 2019    Allergies   Allergen Reactions   • Imuran [Azathioprine]       hemorrhagic urticaria.       Current Outpatient Medications   Medication Instructions   • alfuzosin (UROXATRAL) 10 mg, Oral, Daily   • amLODIPine (NORVASC) 5 mg, Oral, Daily   • aspirin 81 mg, Oral, Every Other Day   • Budesonide (ENTOCORT EC) 6 mg, Oral, Take As Directed, Every 4th day   • Calcium Carbonate (CALCIUM 600 PO) Oral, Daily   • cholecalciferol (VITAMIN D3) 2,000 Units, Oral, Daily   • diphenoxylate-atropine (LOMOTIL) 2.5-0.025 MG per tablet 1 tablet, Oral, As Needed   • finasteride (PROSCAR) 5 mg, Oral, Daily   • glucosamine sulfate 1,000 mg, Oral, Daily   • immune globulin, human, 1 GM/5ML solution Subcutaneous, Weekly, Infusion    • latanoprost (XALATAN) 0.005 % ophthalmic solution 1 drop, Both Eyes, Nightly   • magnesium oxide (MAGOX) 500 mg, Oral, Daily  "  • Multiple Vitamins-Minerals (MULTIVITAMIN ADULT PO) Oral, Daily   • Omega-3 Fatty Acids (FISH OIL) 1000 MG capsule capsule Oral, Daily With Breakfast   • omeprazole (PRILOSEC) 20 mg, Oral, Daily   • ondansetron ODT (ZOFRAN-ODT) 4 mg, Oral, Every 6 Hours PRN   • probiotic (CULTURELLE) capsule capsule Oral, Daily   • sertraline (ZOLOFT) 50 mg, Oral, Daily   • SIMVASTATIN PO 20 mg, Oral, Daily   • tamsulosin (FLOMAX) 0.4 MG capsule 24 hr capsule 1 capsule, Oral, Daily   • vitamin C (ASCORBIC ACID) 500 mg, Oral, Daily   • zolpidem (AMBIEN) 10 mg, Oral, Nightly PRN         HISTORY OF PRESENT ILLNESS:  The patient is here for an 8-month follow-up.  His last stress echocardiogram was in September 2019 showing low probability for significant focal obstructive CAD, EF 65%.  He denies any chest pain, shortness of breath, palpitations, dizziness, presyncope, or syncope.  He had some fatigue and shortness of breath earlier in the year around February 2022 when he was walking in the cold back up to his house.  He is unsure if he may have had COVID at that point.  He has a blood pressure monitor at home but does not often check his blood pressure.  He just had laboratory testing and brought the results of that with him today for me to review.  He is compliant with CPAP nightly.  The patient denies any melena.      ROS   All other systems reviewed and otherwise negative.      ECG 12 Lead    Date/Time: 11/10/2022 4:26 PM  Performed by: Corine Newsome APRN  Authorized by: Corine Newsome APRN   Rhythm comments: Atrial fibrillation, T wave abnormality, consider anterolateral ischemia or digitalis effect, abnormal ECG, 88 bpm, QRS 84 ms,  ms, no prior ECGs to review                 Objective:       Vitals:    11/10/22 1514 11/10/22 1515   BP: 138/84 148/77   BP Location: Right arm Right arm   Patient Position: Sitting Standing   Pulse: 99 92   SpO2: 95%    Weight: 90.3 kg (199 lb)    Height: 179.1 cm (70.5\")    Recheck " blood pressure left arm sitting was 120/70  Body mass index is 28.15 kg/m².  Last weight April 2022 was 202 pounds  Constitutional:       Appearance: Healthy appearance. Not in distress.   Neck:      Vascular: No JVR. JVD normal.   Pulmonary:      Effort: Pulmonary effort is normal.      Breath sounds: Normal breath sounds. No wheezing. No rhonchi. No rales.   Chest:      Chest wall: Not tender to palpatation.   Cardiovascular:      PMI at left midclavicular line. Normal rate. Irregularly irregular rhythm. Normal S1. Normal S2.      Murmurs: There is a grade 1/6 systolic murmur at the URSB.      No gallop. No click. No rub.   Pulses:     Dorsalis pedis: 1+ bilaterally.     Posterior tibial: 1+ bilaterally.  Edema:     Peripheral edema absent.   Abdominal:      General: Bowel sounds are normal.      Palpations: Abdomen is soft.      Tenderness: There is no abdominal tenderness.   Musculoskeletal: Normal range of motion.         General: No tenderness. Skin:     General: Skin is warm and dry.   Neurological:      General: No focal deficit present.      Mental Status: Alert and oriented to person, place and time.           Lab Review:   Lab Results   Component Value Date    GLUCOSE 105 (H) 09/24/2019    BUN 14 09/24/2019    CREATININE 1.26 09/24/2019    EGFRIFNONA 49 12/17/2021    EGFRIFAFRI 60 12/17/2021    BCR 11.1 09/24/2019    CO2 30.0 (H) 09/24/2019    CALCIUM 9.0 09/24/2019    ALBUMIN 4.20 09/24/2019    AST 26 09/24/2019    ALT 14 09/24/2019       Lab Results   Component Value Date    WBC 5.95 09/24/2019    HGB 14.3 09/24/2019    HCT 42.7 09/24/2019    MCV 98.2 (H) 09/24/2019     09/24/2019   10/14/2022:  · CBC: WBC 3.6, RBC 4.36, hemoglobin 14.7, hematocrit 44.6, .3, MCH 33.7, MCHC 33, platelets 144, RDW 12.7, MPV 10  · Vitamin D-73.9  · Lipid panel: Cholesterol 147, triglycerides 85, HDL 59, LDL 78  · CMP: Sodium 142, potassium 4.8, chloride 105, carbon dioxide 29, glucose 90, BUN 16, creatinine  1.13, GFR 67, calcium 9, protein 6.9, albumin 4.1, bilirubin 0.6, AST 28, ALT 18, alkaline phosphatase 66      Advance Care Planning   ACP discussion was held with the patient during this visit. Patient has an advance directive (not in EMR), copy requested.             Assessment:     Overall continued acceptable course with no new interim cardiopulmonary complaints with acceptable functional status.  The patient was incidentally found to be in atrial fibrillation today in office but he is asymptomatic.  I will have him wear an E patch to assess atrial fib burden.  He will stop aspirin and start Eliquis 5 mg twice daily and I will make a referral for the patient to see electrophysiology.  The patient has a history of Crohn's so is likely not a good candidate for long-term anticoagulation due to risk for GI bleed.  He is compliant with CPAP nightly.  I will order an echocardiogram to assess heart structure/function.  He will also begin bisoprolol 2.5 mg daily.     Diagnosis Plan   1. Hypertensive heart disease without heart failure  No recurrent angina pectoris or CHF on current activity schedule; continue current treatment      2. Dyslipidemia   Acceptable lipid panel October 2022, continue omega-3 fish oil, simvastatin      3. Obstructive sleep apnea on CPAP   Continued compliance   4.  Paroxysmal atrial fibrillation: Bisoprolol 2.5 mg daily, echocardiogram, E patch, referral to EP, Eliquis 5 mg twice daily, discontinue aspirin, patient will call if he notices any melena          Plan:         1. Patient to continue current medications and close follow up with the above providers.  2. Tentative cardiology follow up in May 2023 or patient may return sooner PRN.  3. Discontinue aspirin  4. Eliquis 5 mg twice daily  5. Referral to EP  6. E patch  7. Echocardiogram  8. Bisoprolol 2.5 mg daily      Electronically signed by ROSALBA Mcmahon, 11/10/22, 4:32 PM EST.

## 2022-11-10 ENCOUNTER — OFFICE VISIT (OUTPATIENT)
Dept: CARDIOLOGY | Facility: CLINIC | Age: 77
End: 2022-11-10

## 2022-11-10 VITALS
OXYGEN SATURATION: 95 % | HEART RATE: 92 BPM | SYSTOLIC BLOOD PRESSURE: 148 MMHG | HEIGHT: 71 IN | WEIGHT: 199 LBS | DIASTOLIC BLOOD PRESSURE: 77 MMHG | BODY MASS INDEX: 27.86 KG/M2

## 2022-11-10 DIAGNOSIS — I11.9 HYPERTENSIVE HEART DISEASE WITHOUT HEART FAILURE: Primary | ICD-10-CM

## 2022-11-10 DIAGNOSIS — Z99.89 OBSTRUCTIVE SLEEP APNEA ON CPAP: ICD-10-CM

## 2022-11-10 DIAGNOSIS — I48.0 PAF (PAROXYSMAL ATRIAL FIBRILLATION): ICD-10-CM

## 2022-11-10 DIAGNOSIS — G47.33 OBSTRUCTIVE SLEEP APNEA ON CPAP: ICD-10-CM

## 2022-11-10 DIAGNOSIS — I48.0 PAF (PAROXYSMAL ATRIAL FIBRILLATION): Primary | ICD-10-CM

## 2022-11-10 DIAGNOSIS — E78.5 DYSLIPIDEMIA: ICD-10-CM

## 2022-11-10 PROCEDURE — 99214 OFFICE O/P EST MOD 30 MIN: CPT | Performed by: NURSE PRACTITIONER

## 2022-11-10 PROCEDURE — 93000 ELECTROCARDIOGRAM COMPLETE: CPT | Performed by: NURSE PRACTITIONER

## 2022-11-10 RX ORDER — ZOLPIDEM TARTRATE 10 MG/1
10 TABLET ORAL NIGHTLY PRN
COMMUNITY

## 2022-11-15 RX ORDER — BISOPROLOL FUMARATE 5 MG/1
2.5 TABLET, FILM COATED ORAL DAILY
Qty: 45 TABLET | Refills: 1 | Status: SHIPPED | OUTPATIENT
Start: 2022-11-15 | End: 2023-01-16 | Stop reason: HOSPADM

## 2022-11-15 RX ORDER — BISOPROLOL FUMARATE 5 MG/1
2.5 TABLET, FILM COATED ORAL DAILY
Qty: 45 TABLET | Refills: 1 | Status: SHIPPED | OUTPATIENT
Start: 2022-11-15 | End: 2022-11-15 | Stop reason: SDUPTHER

## 2022-12-07 ENCOUNTER — DOCUMENTATION (OUTPATIENT)
Dept: CARDIOLOGY | Facility: CLINIC | Age: 77
End: 2022-12-07

## 2022-12-07 NOTE — PROGRESS NOTES
I tried to call the patient but he did not answer.  On his event monitor results, he was found to be in 100% atrial fib with minimum heart rate 51 bpm, maximum heart rate 172 bpm with average 83 bpm with a few PVCs.  He has an upcoming appointment with electrophysiology in January 2023 and I would recommend for him to follow-up for this appointment.  The patient has a history of Crohn's and likely not a good candidate for long-term anticoagulation due to risk for GI bleed; currently anticoagulated with Eliquis.  He was asymptomatic with his atrial fibrillation at last office appointment.  I will have Linda Ibarra RN try to call him again later.    Electronically signed by ROSALBA Mcmahon, 12/07/22, 9:43 AM EST.

## 2022-12-08 ENCOUNTER — TELEPHONE (OUTPATIENT)
Dept: CARDIOLOGY | Facility: CLINIC | Age: 77
End: 2022-12-08

## 2022-12-08 NOTE — TELEPHONE ENCOUNTER
Called pt, LVM with ROSALBA Escobar holter results in note 12/7/22. Advised pt to call back with any questions.

## 2022-12-15 ENCOUNTER — HOSPITAL ENCOUNTER (OUTPATIENT)
Dept: CARDIOLOGY | Facility: HOSPITAL | Age: 77
Discharge: HOME | End: 2022-12-15
Admitting: NURSE PRACTITIONER

## 2022-12-15 VITALS — BODY MASS INDEX: 28.49 KG/M2 | WEIGHT: 199 LBS | HEIGHT: 70 IN

## 2022-12-15 LAB
ASCENDING AORTA: 2.8 CM
BH CV ECHO MEAS - AO MAX PG: 2.7 MMHG
BH CV ECHO MEAS - AO MEAN PG: 1.25 MMHG
BH CV ECHO MEAS - AO ROOT DIAM: 3.5 CM
BH CV ECHO MEAS - AO V2 MAX: 81.9 CM/SEC
BH CV ECHO MEAS - AO V2 VTI: 13.6 CM
BH CV ECHO MEAS - AVA(I,D): 2.42 CM2
BH CV ECHO MEAS - EDV(CUBED): 64 ML
BH CV ECHO MEAS - EDV(MOD-SP2): 58 ML
BH CV ECHO MEAS - EDV(MOD-SP4): 50 ML
BH CV ECHO MEAS - EF(MOD-BP): 60 %
BH CV ECHO MEAS - EF(MOD-SP2): 60.3 %
BH CV ECHO MEAS - EF(MOD-SP4): 60 %
BH CV ECHO MEAS - ESV(CUBED): 31 ML
BH CV ECHO MEAS - ESV(MOD-SP2): 23 ML
BH CV ECHO MEAS - ESV(MOD-SP4): 20 ML
BH CV ECHO MEAS - FS: 21.5 %
BH CV ECHO MEAS - IVS/LVPW: 1 CM
BH CV ECHO MEAS - IVSD: 1.22 CM
BH CV ECHO MEAS - LA DIMENSION: 2.8 CM
BH CV ECHO MEAS - LAT PEAK E' VEL: 10.3 CM/SEC
BH CV ECHO MEAS - LV DIASTOLIC VOL/BSA (35-75): 24 CM2
BH CV ECHO MEAS - LV MASS(C)D: 169.6 GRAMS
BH CV ECHO MEAS - LV MAX PG: 1.66 MMHG
BH CV ECHO MEAS - LV MEAN PG: 0.8 MMHG
BH CV ECHO MEAS - LV SYSTOLIC VOL/BSA (12-30): 9.6 CM2
BH CV ECHO MEAS - LV V1 MAX: 63.9 CM/SEC
BH CV ECHO MEAS - LV V1 VTI: 10.4 CM
BH CV ECHO MEAS - LVIDD: 4 CM
BH CV ECHO MEAS - LVIDS: 3.1 CM
BH CV ECHO MEAS - LVOT AREA: 3.1 CM2
BH CV ECHO MEAS - LVOT DIAM: 2 CM
BH CV ECHO MEAS - LVPWD: 1.22 CM
BH CV ECHO MEAS - MED PEAK E' VEL: 8.2 CM/SEC
BH CV ECHO MEAS - MV DEC SLOPE: 415 CM/SEC2
BH CV ECHO MEAS - MV DEC TIME: 0.16 MSEC
BH CV ECHO MEAS - MV E MAX VEL: 56.8 CM/SEC
BH CV ECHO MEAS - MV MAX PG: 2.13 MMHG
BH CV ECHO MEAS - MV MEAN PG: 1 MMHG
BH CV ECHO MEAS - MV P1/2T: 58.2 MSEC
BH CV ECHO MEAS - MV V2 VTI: 13.7 CM
BH CV ECHO MEAS - MVA(P1/2T): 3.8 CM2
BH CV ECHO MEAS - MVA(VTI): 2.39 CM2
BH CV ECHO MEAS - PA ACC SLOPE: 373 CM/SEC2
BH CV ECHO MEAS - PA ACC TIME: 0.1 SEC
BH CV ECHO MEAS - PA PR(ACCEL): 33.1 MMHG
BH CV ECHO MEAS - PA V2 MAX: 84.5 CM/SEC
BH CV ECHO MEAS - RAP SYSTOLE: 3 MMHG
BH CV ECHO MEAS - RVSP: 28.2 MMHG
BH CV ECHO MEAS - SI(MOD-SP2): 16.8 ML/M2
BH CV ECHO MEAS - SI(MOD-SP4): 14.4 ML/M2
BH CV ECHO MEAS - SV(LVOT): 32.8 ML
BH CV ECHO MEAS - SV(MOD-SP2): 35 ML
BH CV ECHO MEAS - SV(MOD-SP4): 30 ML
BH CV ECHO MEAS - TAPSE (>1.6): 1.6 CM
BH CV ECHO MEAS - TR MAX PG: 25.2 MMHG
BH CV ECHO MEAS - TR MAX VEL: 251 CM/SEC
BH CV ECHO MEASUREMENTS AVERAGE E/E' RATIO: 6.14
BH CV VAS BP RIGHT ARM: NORMAL MMHG
BH CV XLRA - RV BASE: 3.4 CM
BH CV XLRA - RV LENGTH: 5.8 CM
BH CV XLRA - RV MID: 3.3 CM
BH CV XLRA - TDI S': 9.98 CM/SEC
IVRT: 58 MSEC
LEFT ATRIUM VOLUME INDEX: 40.9 ML/M2
LV EF 2D ECHO EST: 55 %
MAXIMAL PREDICTED HEART RATE: 144 BPM
STRESS TARGET HR: 122 BPM

## 2022-12-15 PROCEDURE — 93306 TTE W/DOPPLER COMPLETE: CPT

## 2022-12-15 PROCEDURE — 93306 TTE W/DOPPLER COMPLETE: CPT | Performed by: INTERNAL MEDICINE

## 2022-12-16 ENCOUNTER — DOCUMENTATION (OUTPATIENT)
Dept: CARDIOLOGY | Facility: CLINIC | Age: 77
End: 2022-12-16

## 2022-12-16 NOTE — PROGRESS NOTES
I called and spoke with the patient's wife regarding his echocardiogram results which were acceptable.  The patient has some fatigue and was encouraged to use his CPAP nightly since he currently has not been using it every night.  He has an upcoming appointment with Dr. Esparza in January 2023 regarding his atrial fibrillation.  He was encouraged to go to this appointment.    Electronically signed by ROSALBA Mcmahon, 12/16/22, 10:07 AM EST.

## 2023-01-11 ENCOUNTER — OFFICE VISIT (OUTPATIENT)
Dept: CARDIOLOGY | Facility: CLINIC | Age: 78
End: 2023-01-11
Payer: MEDICARE

## 2023-01-11 VITALS
DIASTOLIC BLOOD PRESSURE: 78 MMHG | WEIGHT: 200 LBS | HEART RATE: 66 BPM | SYSTOLIC BLOOD PRESSURE: 138 MMHG | BODY MASS INDEX: 28 KG/M2 | HEIGHT: 71 IN | OXYGEN SATURATION: 97 %

## 2023-01-11 DIAGNOSIS — Z99.89 OSA ON CPAP: ICD-10-CM

## 2023-01-11 DIAGNOSIS — G47.33 OSA ON CPAP: ICD-10-CM

## 2023-01-11 DIAGNOSIS — I48.19 ATRIAL FIBRILLATION, PERSISTENT: Primary | ICD-10-CM

## 2023-01-11 PROCEDURE — 93000 ELECTROCARDIOGRAM COMPLETE: CPT | Performed by: INTERNAL MEDICINE

## 2023-01-11 PROCEDURE — 99214 OFFICE O/P EST MOD 30 MIN: CPT | Performed by: INTERNAL MEDICINE

## 2023-01-11 NOTE — PROGRESS NOTES
Electrophysiology Clinic Consult     Luis Gamboa  1945  [unfilled]  [unfilled]    01/11/23    DATE OF ADMISSION: (Not on file)  [facility]    Anand De Souza MD  [address] / [place] [address]  Referring Provider: Corine Newsome APRN     Chief Complaint   Patient presents with   • Atrial Fibrillation     New Patient       Problem list:   1. Persistent Atrial Fibrillation   a. CHADSvasc = 3 started Eliquis 11/2022  b. Stress echocardiogram 9/12/2019: Estimated EF = 65%.Left ventricular systolic function is normal. Acceptable negative echocardiographic exercise stress test without anginal type chest discomfort, definitive ischemic EKG changes, or echocardiographic regional wall motion abnormality with preserved systolic left ventricular function following exercise to 85% predicted maximal heart rate and 88% predicted exercise capacity.Overall, low probability for significant focal obstructive coronary artery disease with preserved systolic left ventricular function  c. Diagnosed November 2022 in office  d. Event Monitor 11/10-11/24/2022: 100% AFIB with average HR 83 bpm  e. Echocardiogram 12/15/2022: EF 55%, no significant valve disease  2. HTN  3. HLP  4. ZAY - on CPAP  5. GERD  6. Remote left LE DVT  7. BPH  8. Crohn's disease with partial colectomy and colostomy -Dr. Lester,   9. Anxiety/depression  10. Surgical History:  a. Partial colectomy  b. Hand surgery  c. Lithotripsy         History of Present Illness:   Luis Gamboa is a 77 year old male with above history who presents today for consultation, referred by Corine NAVARRETE for new diagnosis of AFIB. He was diagnosed with atrial fibrillation incidentally at an office visit with Corine on 11/109/2022. Just prior to this visit, he had recently been sick with a virus. He tested negative for COVID, but he states he was more tired than he had ever been and was coughing a lot. He was unaware of  palpitations at the time of diagnosis and the only symptom has been fatigue, although, he notes that he has been fatigued for 2 years. He feels it is worse in the last three months. He denies CP, but has mild SOB. No syncope. He was started on Eliquis 11/10/2022. Event monitor 11/2022 showed 100% AFIB with average HR 83 bpm. Echocardiogram 12/15/2022 showed normal EF of 55%. He has not had ECV. He was started on bisoprolol but no AAD.   - BP is well controlled. He denies thyroid disease.   - He has ZAY and wears a CPAP most of the time  Tobacco: none  ETOH: none  Caffeine: decaf coffee, occasional tea     Allergies   Allergen Reactions   • Imuran [Azathioprine]       hemorrhagic urticaria.        Cannot display prior to admission medications because the patient has not been admitted in this contact.            Current Outpatient Medications:   •  alfuzosin (UROXATRAL) 10 MG 24 hr tablet, Take 10 mg by mouth Daily., Disp: , Rfl:   •  amLODIPine (NORVASC) 5 MG tablet, Take 1 tablet by mouth Daily., Disp: 90 tablet, Rfl: 3  •  apixaban (ELIQUIS) 5 MG tablet tablet, Take 1 tablet by mouth 2 (Two) Times a Day., Disp: 60 tablet, Rfl: 5  •  bisoprolol (ZEBeta) 5 MG tablet, Take 0.5 tablets by mouth Daily., Disp: 45 tablet, Rfl: 1  •  Budesonide (ENTOCORT EC) 3 MG 24 hr capsule, Take 6 mg by mouth Take As Directed. Every 4th day, Disp: , Rfl:   •  Calcium Carbonate (CALCIUM 600 PO), Take  by mouth Daily., Disp: , Rfl:   •  cholecalciferol (VITAMIN D3) 1000 units tablet, Take 2,000 Units by mouth Daily., Disp: , Rfl:   •  diphenoxylate-atropine (LOMOTIL) 2.5-0.025 MG per tablet, Take 1 tablet by mouth As Needed for diarrhea., Disp: , Rfl:   •  finasteride (PROSCAR) 5 MG tablet, Take 5 mg by mouth Daily., Disp: , Rfl:   •  glucosamine sulfate 500 MG capsule capsule, Take 1,000 mg by mouth Daily., Disp: , Rfl:   •  immune globulin, human, 1 GM/5ML solution, Inject  under the skin into the appropriate area as directed 1 (One)  Time Per Week. Infusion, Disp: , Rfl:   •  latanoprost (XALATAN) 0.005 % ophthalmic solution, Administer 1 drop to both eyes Every Night., Disp: , Rfl:   •  magnesium oxide (MAGOX) 400 (241.3 MG) MG tablet tablet, Take 500 mg by mouth Daily., Disp: , Rfl:   •  Multiple Vitamins-Minerals (MULTIVITAMIN ADULT PO), Take  by mouth Daily., Disp: , Rfl:   •  Omega-3 Fatty Acids (FISH OIL) 1000 MG capsule capsule, Take  by mouth Daily With Breakfast., Disp: , Rfl:   •  omeprazole (priLOSEC) 20 MG capsule, Take 20 mg by mouth Daily., Disp: , Rfl:   •  ondansetron ODT (ZOFRAN-ODT) 4 MG disintegrating tablet, Take 1 tablet by mouth Every 6 (Six) Hours As Needed for Nausea or Vomiting., Disp: 15 tablet, Rfl: 0  •  probiotic (CULTURELLE) capsule capsule, Take  by mouth Daily., Disp: , Rfl:   •  sertraline (ZOLOFT) 50 MG tablet, Take 50 mg by mouth Daily., Disp: , Rfl:   •  SIMVASTATIN PO, Take 20 mg by mouth Daily., Disp: , Rfl:   •  tamsulosin (FLOMAX) 0.4 MG capsule 24 hr capsule, Take 1 capsule by mouth Daily., Disp: , Rfl:   •  vitamin C (ASCORBIC ACID) 500 MG tablet, Take 500 mg by mouth Daily., Disp: , Rfl:   •  zolpidem (AMBIEN) 10 MG tablet, Take 1 tablet by mouth At Night As Needed for Sleep., Disp: , Rfl:     Social History     Socioeconomic History   • Marital status:    Tobacco Use   • Smoking status: Former     Types: Cigarettes     Quit date:      Years since quittin.0   • Smokeless tobacco: Never   Vaping Use   • Vaping Use: Never used   Substance and Sexual Activity   • Alcohol use: No   • Drug use: No   • Sexual activity: Defer       Family History   Problem Relation Age of Onset   • No Known Problems Mother    • No Known Problems Father        REVIEW OF SYSTEMS:   CONST:  No weight loss, fever, chills,+  weakness + fatigue.   HEENT:  No visual loss, blurred vision, double vision, yellow sclerae.                   No hearing loss, congestion, sore throat.   SKIN:      No rashes, urticaria,  "ulcers, sores.     RESP:     No shortness of breath, hemoptysis, cough, sputum.   GI:           No anorexia, nausea, vomiting, diarrhea. No abdominal pain, melena.   :         No burning on urination, hematuria or increased frequency.  ENDO:    No diaphoresis, cold or heat intolerance. No polyuria or polydipsia.   NEURO:  No headache, dizziness, syncope, paralysis, ataxia, or parasthesias.                  No change in bowel or bladder control. No history of CVA/TIA  MUSC:    No muscle, back pain, joint pain or stiffness.   HEME:    No anemia, bleeding, bruising. No history of DVT/PE.  PSYCH:  No history of depression, anxiety    Vitals:    01/11/23 1512   BP: 138/78   BP Location: Left arm   Patient Position: Sitting   Pulse: 66   SpO2: 97%   Weight: 90.7 kg (200 lb)   Height: 179.1 cm (70.5\")                 Physical Exam:  GEN: Well nourished, well-developed, no acute distress  HEENT: Normocephalic, atraumatic, PERRLA, moist mucous membranes  NECK: Supple, NO JVD, no thyromegaly, no lymphadenopathy  CARD: S1S2, irr, irr , no murmur, gallop, rub, PMI NL   LUNGS: Clear to auscultation, normal respiratory effort  ABDOMEN: Soft, nontender, normal bowel sounds  EXTREMITIES: No gross deformities, no clubbing, cyanosis, or edema  SKIN: Warm, dry  NEURO: No focal deficits, alert and oriented x 3  PSYCHIATRIC: Normal affect and mood      I personally viewed and interpreted the patient's EKG/Telemetry/lab data    Lab Results   Component Value Date    GLUCOSE 105 (H) 09/24/2019    CALCIUM 9.0 09/24/2019     09/24/2019    K 4.5 09/24/2019    CO2 30.0 (H) 09/24/2019     09/24/2019    BUN 14 09/24/2019    CREATININE 1.26 09/24/2019    EGFRIFAFRI 60 12/17/2021    EGFRIFNONA 49 12/17/2021    BCR 11.1 09/24/2019    ANIONGAP 8.0 09/24/2019     Lab Results   Component Value Date    WBC 5.95 09/24/2019    HGB 14.3 09/24/2019    HCT 42.7 09/24/2019    MCV 98.2 (H) 09/24/2019     09/24/2019     No results found " for: INR, PROTIME  No results found for: TSH, O9FQWVA, K9EHCNM, THYROIDAB          ECG 12 Lead    Date/Time: 1/11/2023 4:01 PM  Performed by: Tevin Esparza MD  Authorized by: Tevin Esparza MD   Comparison: compared with previous ECG from 11/10/2022  Similar to previous ECG  Rhythm: atrial fibrillation  BPM: 62                ICD-10-CM ICD-9-CM   1. Atrial fibrillation, persistent (HCC)  I48.19 427.31   2. ZAY on CPAP  G47.33 327.23    Z99.89 V46.8       Assessment and Plan:   1. Persistent Atrial Fibrillation:  - diagnosed incidentally at office visit 11/10/2022, unknown duration, but likely occurring after a viral illness in October 2022  - he is symptomatic with severe fatigue over the last 3 months  - will plan to pursue ECV as he has been anticoagulated since 11/2022. Will not start antiarrythmic due to his resting HR being in the 50s currently. We can assess HR after ECV and add antiarrythmic if needed.   - CHADSVASc = 3 on Eliquis. Will continue for now. In the future, may be able to stop once maintaining NSR in setting of his Crohn's disease and risk for GI bleeding. Or could consider Watchman Device if needed in the future.     2. ZAY:  - on CPAP     Scribed for Tevin Esparza MD by Elana Jaramillo PA-C. 1/11/2023  16:07 EST     TITO, Tevin Esparza MD, personally performed the services described in this documentation as scribed by the above named individual in my presence, and it is both accurate and complete.  1/12/2023  16:37 EST

## 2023-01-11 NOTE — H&P (VIEW-ONLY)
Electrophysiology Clinic Consult     Luis Gamboa  1945  [unfilled]  [unfilled]    01/11/23    DATE OF ADMISSION: (Not on file)  [facility] CARDIOLOGY MAIN CAMPUS    Anand De Souza MD  [address] / [place] [address]  Referring Provider: Corine Newsome APRN     Chief Complaint   Patient presents with   • Atrial Fibrillation     New Patient       Problem list:   1. Persistent Atrial Fibrillation   a. CHADSvasc = 3 started Eliquis 11/2022  b. Stress echocardiogram 9/12/2019: Estimated EF = 65%.Left ventricular systolic function is normal. Acceptable negative echocardiographic exercise stress test without anginal type chest discomfort, definitive ischemic EKG changes, or echocardiographic regional wall motion abnormality with preserved systolic left ventricular function following exercise to 85% predicted maximal heart rate and 88% predicted exercise capacity.Overall, low probability for significant focal obstructive coronary artery disease with preserved systolic left ventricular function  c. Diagnosed November 2022 in office  d. Event Monitor 11/10-11/24/2022: 100% AFIB with average HR 83 bpm  e. Echocardiogram 12/15/2022: EF 55%, no significant valve disease  2. HTN  3. HLP  4. ZAY - on CPAP  5. GERD  6. Remote left LE DVT  7. BPH  8. Crohn's disease with partial colectomy and colostomy -Dr. Lester,   9. Anxiety/depression  10. Surgical History:  a. Partial colectomy  b. Hand surgery  c. Lithotripsy         History of Present Illness:   Luis Gamboa is a 77 year old male with above history who presents today for consultation, referred by Corine NAVARRETE for new diagnosis of AFIB. He was diagnosed with atrial fibrillation incidentally at an office visit with Corine on 11/109/2022. Just prior to this visit, he had recently been sick with a virus. He tested negative for COVID, but he states he was more tired than he had ever been and was coughing a lot. He was unaware of  palpitations at the time of diagnosis and the only symptom has been fatigue, although, he notes that he has been fatigued for 2 years. He feels it is worse in the last three months. He denies CP, but has mild SOB. No syncope. He was started on Eliquis 11/10/2022. Event monitor 11/2022 showed 100% AFIB with average HR 83 bpm. Echocardiogram 12/15/2022 showed normal EF of 55%. He has not had ECV. He was started on bisoprolol but no AAD.   - BP is well controlled. He denies thyroid disease.   - He has ZAY and wears a CPAP most of the time  Tobacco: none  ETOH: none  Caffeine: decaf coffee, occasional tea     Allergies   Allergen Reactions   • Imuran [Azathioprine]       hemorrhagic urticaria.        Cannot display prior to admission medications because the patient has not been admitted in this contact.            Current Outpatient Medications:   •  alfuzosin (UROXATRAL) 10 MG 24 hr tablet, Take 10 mg by mouth Daily., Disp: , Rfl:   •  amLODIPine (NORVASC) 5 MG tablet, Take 1 tablet by mouth Daily., Disp: 90 tablet, Rfl: 3  •  apixaban (ELIQUIS) 5 MG tablet tablet, Take 1 tablet by mouth 2 (Two) Times a Day., Disp: 60 tablet, Rfl: 5  •  bisoprolol (ZEBeta) 5 MG tablet, Take 0.5 tablets by mouth Daily., Disp: 45 tablet, Rfl: 1  •  Budesonide (ENTOCORT EC) 3 MG 24 hr capsule, Take 6 mg by mouth Take As Directed. Every 4th day, Disp: , Rfl:   •  Calcium Carbonate (CALCIUM 600 PO), Take  by mouth Daily., Disp: , Rfl:   •  cholecalciferol (VITAMIN D3) 1000 units tablet, Take 2,000 Units by mouth Daily., Disp: , Rfl:   •  diphenoxylate-atropine (LOMOTIL) 2.5-0.025 MG per tablet, Take 1 tablet by mouth As Needed for diarrhea., Disp: , Rfl:   •  finasteride (PROSCAR) 5 MG tablet, Take 5 mg by mouth Daily., Disp: , Rfl:   •  glucosamine sulfate 500 MG capsule capsule, Take 1,000 mg by mouth Daily., Disp: , Rfl:   •  immune globulin, human, 1 GM/5ML solution, Inject  under the skin into the appropriate area as directed 1 (One)  Time Per Week. Infusion, Disp: , Rfl:   •  latanoprost (XALATAN) 0.005 % ophthalmic solution, Administer 1 drop to both eyes Every Night., Disp: , Rfl:   •  magnesium oxide (MAGOX) 400 (241.3 MG) MG tablet tablet, Take 500 mg by mouth Daily., Disp: , Rfl:   •  Multiple Vitamins-Minerals (MULTIVITAMIN ADULT PO), Take  by mouth Daily., Disp: , Rfl:   •  Omega-3 Fatty Acids (FISH OIL) 1000 MG capsule capsule, Take  by mouth Daily With Breakfast., Disp: , Rfl:   •  omeprazole (priLOSEC) 20 MG capsule, Take 20 mg by mouth Daily., Disp: , Rfl:   •  ondansetron ODT (ZOFRAN-ODT) 4 MG disintegrating tablet, Take 1 tablet by mouth Every 6 (Six) Hours As Needed for Nausea or Vomiting., Disp: 15 tablet, Rfl: 0  •  probiotic (CULTURELLE) capsule capsule, Take  by mouth Daily., Disp: , Rfl:   •  sertraline (ZOLOFT) 50 MG tablet, Take 50 mg by mouth Daily., Disp: , Rfl:   •  SIMVASTATIN PO, Take 20 mg by mouth Daily., Disp: , Rfl:   •  tamsulosin (FLOMAX) 0.4 MG capsule 24 hr capsule, Take 1 capsule by mouth Daily., Disp: , Rfl:   •  vitamin C (ASCORBIC ACID) 500 MG tablet, Take 500 mg by mouth Daily., Disp: , Rfl:   •  zolpidem (AMBIEN) 10 MG tablet, Take 1 tablet by mouth At Night As Needed for Sleep., Disp: , Rfl:     Social History     Socioeconomic History   • Marital status:    Tobacco Use   • Smoking status: Former     Types: Cigarettes     Quit date:      Years since quittin.0   • Smokeless tobacco: Never   Vaping Use   • Vaping Use: Never used   Substance and Sexual Activity   • Alcohol use: No   • Drug use: No   • Sexual activity: Defer       Family History   Problem Relation Age of Onset   • No Known Problems Mother    • No Known Problems Father        REVIEW OF SYSTEMS:   CONST:  No weight loss, fever, chills,+  weakness + fatigue.   HEENT:  No visual loss, blurred vision, double vision, yellow sclerae.                   No hearing loss, congestion, sore throat.   SKIN:      No rashes, urticaria,  "ulcers, sores.     RESP:     No shortness of breath, hemoptysis, cough, sputum.   GI:           No anorexia, nausea, vomiting, diarrhea. No abdominal pain, melena.   :         No burning on urination, hematuria or increased frequency.  ENDO:    No diaphoresis, cold or heat intolerance. No polyuria or polydipsia.   NEURO:  No headache, dizziness, syncope, paralysis, ataxia, or parasthesias.                  No change in bowel or bladder control. No history of CVA/TIA  MUSC:    No muscle, back pain, joint pain or stiffness.   HEME:    No anemia, bleeding, bruising. No history of DVT/PE.  PSYCH:  No history of depression, anxiety    Vitals:    01/11/23 1512   BP: 138/78   BP Location: Left arm   Patient Position: Sitting   Pulse: 66   SpO2: 97%   Weight: 90.7 kg (200 lb)   Height: 179.1 cm (70.5\")                 Physical Exam:  GEN: Well nourished, well-developed, no acute distress  HEENT: Normocephalic, atraumatic, PERRLA, moist mucous membranes  NECK: Supple, NO JVD, no thyromegaly, no lymphadenopathy  CARD: S1S2, irr, irr , no murmur, gallop, rub, PMI NL   LUNGS: Clear to auscultation, normal respiratory effort  ABDOMEN: Soft, nontender, normal bowel sounds  EXTREMITIES: No gross deformities, no clubbing, cyanosis, or edema  SKIN: Warm, dry  NEURO: No focal deficits, alert and oriented x 3  PSYCHIATRIC: Normal affect and mood      I personally viewed and interpreted the patient's EKG/Telemetry/lab data    Lab Results   Component Value Date    GLUCOSE 105 (H) 09/24/2019    CALCIUM 9.0 09/24/2019     09/24/2019    K 4.5 09/24/2019    CO2 30.0 (H) 09/24/2019     09/24/2019    BUN 14 09/24/2019    CREATININE 1.26 09/24/2019    EGFRIFAFRI 60 12/17/2021    EGFRIFNONA 49 12/17/2021    BCR 11.1 09/24/2019    ANIONGAP 8.0 09/24/2019     Lab Results   Component Value Date    WBC 5.95 09/24/2019    HGB 14.3 09/24/2019    HCT 42.7 09/24/2019    MCV 98.2 (H) 09/24/2019     09/24/2019     No results found " for: INR, PROTIME  No results found for: TSH, R6XMRNK, D1TZHDE, THYROIDAB          ECG 12 Lead    Date/Time: 1/11/2023 4:01 PM  Performed by: Tevin Esparza MD  Authorized by: Tevin Esparza MD   Comparison: compared with previous ECG from 11/10/2022  Similar to previous ECG  Rhythm: atrial fibrillation  BPM: 62                ICD-10-CM ICD-9-CM   1. Atrial fibrillation, persistent (HCC)  I48.19 427.31   2. ZAY on CPAP  G47.33 327.23    Z99.89 V46.8       Assessment and Plan:   1. Persistent Atrial Fibrillation:  - diagnosed incidentally at office visit 11/10/2022, unknown duration, but likely occurring after a viral illness in October 2022  - he is symptomatic with severe fatigue over the last 3 months  - will plan to pursue ECV as he has been anticoagulated since 11/2022. Will not start antiarrythmic due to his resting HR being in the 50s currently. We can assess HR after ECV and add antiarrythmic if needed.   - CHADSVASc = 3 on Eliquis. Will continue for now. In the future, may be able to stop once maintaining NSR in setting of his Crohn's disease and risk for GI bleeding. Or could consider Watchman Device if needed in the future.     2. ZAY:  - on CPAP     Scribed for Tevin Esparza MD by Elana Jaramillo PA-C. 1/11/2023  16:07 EST     TITO, Tevin Esparza MD, personally performed the services described in this documentation as scribed by the above named individual in my presence, and it is both accurate and complete.  1/12/2023  16:37 EST

## 2023-01-16 ENCOUNTER — HOSPITAL ENCOUNTER (OUTPATIENT)
Dept: CARDIOLOGY | Facility: HOSPITAL | Age: 78
Discharge: HOME | End: 2023-01-16
Attending: STUDENT IN AN ORGANIZED HEALTH CARE EDUCATION/TRAINING PROGRAM | Admitting: INTERNAL MEDICINE
Payer: MEDICARE

## 2023-01-16 VITALS
RESPIRATION RATE: 18 BRPM | HEART RATE: 44 BPM | HEIGHT: 70 IN | SYSTOLIC BLOOD PRESSURE: 131 MMHG | BODY MASS INDEX: 28.32 KG/M2 | OXYGEN SATURATION: 99 % | DIASTOLIC BLOOD PRESSURE: 79 MMHG | WEIGHT: 197.8 LBS | TEMPERATURE: 97.6 F

## 2023-01-16 DIAGNOSIS — I48.19 ATRIAL FIBRILLATION, PERSISTENT: ICD-10-CM

## 2023-01-16 LAB
ANION GAP SERPL CALCULATED.3IONS-SCNC: 7 MMOL/L (ref 5–15)
BUN SERPL-MCNC: 13 MG/DL (ref 8–23)
BUN/CREAT SERPL: 11.5 (ref 7–25)
CALCIUM SPEC-SCNC: 8.9 MG/DL (ref 8.6–10.5)
CHLORIDE SERPL-SCNC: 105 MMOL/L (ref 98–107)
CO2 SERPL-SCNC: 30 MMOL/L (ref 22–29)
CREAT SERPL-MCNC: 1.13 MG/DL (ref 0.76–1.27)
DEPRECATED RDW RBC AUTO: 49.4 FL (ref 37–54)
EGFRCR SERPLBLD CKD-EPI 2021: 67.4 ML/MIN/1.73
ERYTHROCYTE [DISTWIDTH] IN BLOOD BY AUTOMATED COUNT: 13.2 % (ref 12.3–15.4)
GLUCOSE SERPL-MCNC: 88 MG/DL (ref 65–99)
HCT VFR BLD AUTO: 42.3 % (ref 37.5–51)
HGB BLD-MCNC: 14.3 G/DL (ref 13–17.7)
MAXIMAL PREDICTED HEART RATE: 144 BPM
MCH RBC QN AUTO: 34.4 PG (ref 26.6–33)
MCHC RBC AUTO-ENTMCNC: 33.8 G/DL (ref 31.5–35.7)
MCV RBC AUTO: 101.7 FL (ref 79–97)
PLATELET # BLD AUTO: 147 10*3/MM3 (ref 140–450)
PMV BLD AUTO: 9.6 FL (ref 6–12)
POTASSIUM SERPL-SCNC: 4.5 MMOL/L (ref 3.5–5.2)
RBC # BLD AUTO: 4.16 10*6/MM3 (ref 4.14–5.8)
SODIUM SERPL-SCNC: 142 MMOL/L (ref 136–145)
STRESS TARGET HR: 122 BPM
WBC NRBC COR # BLD: 4.25 10*3/MM3 (ref 3.4–10.8)

## 2023-01-16 PROCEDURE — 99152 MOD SED SAME PHYS/QHP 5/>YRS: CPT | Performed by: STUDENT IN AN ORGANIZED HEALTH CARE EDUCATION/TRAINING PROGRAM

## 2023-01-16 PROCEDURE — 92960 CARDIOVERSION ELECTRIC EXT: CPT

## 2023-01-16 PROCEDURE — 92960 CARDIOVERSION ELECTRIC EXT: CPT | Performed by: STUDENT IN AN ORGANIZED HEALTH CARE EDUCATION/TRAINING PROGRAM

## 2023-01-16 PROCEDURE — 93005 ELECTROCARDIOGRAM TRACING: CPT | Performed by: STUDENT IN AN ORGANIZED HEALTH CARE EDUCATION/TRAINING PROGRAM

## 2023-01-16 PROCEDURE — 85027 COMPLETE CBC AUTOMATED: CPT | Performed by: STUDENT IN AN ORGANIZED HEALTH CARE EDUCATION/TRAINING PROGRAM

## 2023-01-16 PROCEDURE — 36415 COLL VENOUS BLD VENIPUNCTURE: CPT

## 2023-01-16 PROCEDURE — 25010000002 MIDAZOLAM PER 1 MG: Performed by: STUDENT IN AN ORGANIZED HEALTH CARE EDUCATION/TRAINING PROGRAM

## 2023-01-16 PROCEDURE — 80048 BASIC METABOLIC PNL TOTAL CA: CPT | Performed by: STUDENT IN AN ORGANIZED HEALTH CARE EDUCATION/TRAINING PROGRAM

## 2023-01-16 RX ORDER — FENTANYL CITRATE 50 UG/ML
50-100 INJECTION, SOLUTION INTRAMUSCULAR; INTRAVENOUS ONCE AS NEEDED
Status: DISCONTINUED | OUTPATIENT
Start: 2023-01-16 | End: 2023-01-16 | Stop reason: HOSPADM

## 2023-01-16 RX ORDER — MIDAZOLAM HYDROCHLORIDE 1 MG/ML
INJECTION INTRAMUSCULAR; INTRAVENOUS
Status: COMPLETED | OUTPATIENT
Start: 2023-01-16 | End: 2023-01-16

## 2023-01-16 RX ORDER — FLUMAZENIL 0.1 MG/ML
0.5 INJECTION INTRAVENOUS ONCE AS NEEDED
Status: DISCONTINUED | OUTPATIENT
Start: 2023-01-16 | End: 2023-01-16 | Stop reason: HOSPADM

## 2023-01-16 RX ORDER — MIDAZOLAM HYDROCHLORIDE 1 MG/ML
2-8 INJECTION INTRAMUSCULAR; INTRAVENOUS ONCE AS NEEDED
Status: DISCONTINUED | OUTPATIENT
Start: 2023-01-16 | End: 2023-01-16 | Stop reason: HOSPADM

## 2023-01-16 RX ORDER — NALOXONE HCL 0.4 MG/ML
0.4 VIAL (ML) INJECTION ONCE AS NEEDED
Status: DISCONTINUED | OUTPATIENT
Start: 2023-01-16 | End: 2023-01-16 | Stop reason: HOSPADM

## 2023-01-16 RX ADMIN — MIDAZOLAM 1 MG: 1 INJECTION INTRAMUSCULAR; INTRAVENOUS at 12:12

## 2023-01-16 NOTE — INTERVAL H&P NOTE
Pre-procedure Report  Cardiovascular Laboratory  [facility]    Patient:  Luis Gamboa  :  1945  PCP:  Anand De Souza MD  PHONE:  [phone]    DATE: 2023    Chief Complaint: Persistent atrial fibrillation    Echocardiogram 12/15/2022:  Left ventricular systolic function is normal. Estimated left ventricular EF = 55%    Left ventricular wall thickness is consistent with hypertrophy.    Left atrial volume is mildly increased.    The cardiac valves are anatomically and functionally normal.    Estimated right ventricular systolic pressure from tricuspid regurgitation is normal (<35 mmHg).    Atrial fibrillation noted throughout study    Stress test within last 6 months:   no       Previous cardiac catheterization:  no      Allergies:       Allergies   Allergen Reactions    Imuran [Azathioprine]       hemorrhagic urticaria.       MEDICATIONS:  Prior to Admission medications    Medication Sig Start Date End Date Taking? Authorizing Provider   alfuzosin (UROXATRAL) 10 MG 24 hr tablet Take 10 mg by mouth Daily.   Yes Tracie Gamble MD   amLODIPine (NORVASC) 5 MG tablet Take 1 tablet by mouth Daily. 10/1/21  Yes Adria Mayers MD   apixaban (ELIQUIS) 5 MG tablet tablet Take 1 tablet by mouth 2 (Two) Times a Day. 11/10/22  Yes Corine Newsome APRN   bisoprolol (ZEBeta) 5 MG tablet Take 0.5 tablets by mouth Daily. 11/15/22  Yes Corine Newsome APRN   Budesonide (ENTOCORT EC) 3 MG 24 hr capsule Take 6 mg by mouth Take As Directed. Every 4th day   Yes Tracie Gamble MD   Calcium Carbonate (CALCIUM 600 PO) Take  by mouth Daily.   Yes Tracie Gamble MD   cholecalciferol (VITAMIN D3) 1000 units tablet Take 2,000 Units by mouth Daily.   Yes Tracie Gamble MD   diphenoxylate-atropine (LOMOTIL) 2.5-0.025 MG per tablet Take 1 tablet by mouth As Needed for diarrhea.   Yes Tracie Gamble MD   finasteride (PROSCAR) 5 MG tablet Take 5 mg by mouth Daily.   Yes  Tracie Gamble MD   glucosamine sulfate 500 MG capsule capsule Take 1,000 mg by mouth Daily.   Yes Tracie Gamble MD   immune globulin, human, 1 GM/5ML solution Inject  under the skin into the appropriate area as directed 1 (One) Time Per Week. Infusion   Yes Tracie Gamble MD   magnesium oxide (MAGOX) 400 (241.3 MG) MG tablet tablet Take 500 mg by mouth Daily.   Yes Tracie Gamble MD   Multiple Vitamins-Minerals (MULTIVITAMIN ADULT PO) Take  by mouth Daily.   Yes Tracie Gamble MD   Omega-3 Fatty Acids (FISH OIL) 1000 MG capsule capsule Take  by mouth Daily With Breakfast.   Yes Tracie Gamble MD   omeprazole (priLOSEC) 20 MG capsule Take 20 mg by mouth Daily.   Yes Tracie Gamble MD   ondansetron ODT (ZOFRAN-ODT) 4 MG disintegrating tablet Take 1 tablet by mouth Every 6 (Six) Hours As Needed for Nausea or Vomiting. 9/24/19  Yes Vincent Carolina MD   probiotic (CULTURELLE) capsule capsule Take  by mouth Daily.   Yes Tracie Gamble MD   sertraline (ZOLOFT) 50 MG tablet Take 50 mg by mouth Daily.   Yes Tracie Gamble MD   SIMVASTATIN PO Take 20 mg by mouth Daily.   Yes Tracie Gamble MD   tamsulosin (FLOMAX) 0.4 MG capsule 24 hr capsule Take 1 capsule by mouth Daily.   Yes Tracie Gamble MD   vitamin C (ASCORBIC ACID) 500 MG tablet Take 500 mg by mouth Daily.   Yes Tracie Gamble MD   zolpidem (AMBIEN) 10 MG tablet Take 1 tablet by mouth At Night As Needed for Sleep.   Yes Tracie Gamble MD   latanoprost (XALATAN) 0.005 % ophthalmic solution Administer 1 drop to both eyes Every Night. 1/25/22   Tracie Gamble MD       Past medical & surgical history, social and family history reviewed in the electronic medical record.    Physical Exam:    Vitals:   Vitals:    01/16/23 1025   BP: (!) 142/101   Pulse: 68   Resp:    Temp:    SpO2: 97%          01/16/23  1022   Weight: 89.7 kg (197 lb 12.8 oz)   Body mass index is 28.38  kg/m².    GENERAL: No apparent distress.  No significant changes since last exam.  CHEST: Clear to auscultation bilaterally no stridor no wheeze.  CV: S1, S2, Irregular without Murmurs, Rubs or Gallops  EXTREMITIES: No edema.            Results from last 7 days   Lab Units 01/16/23  1037   WBC 10*3/mm3 4.25   HEMOGLOBIN g/dL 14.3   HEMATOCRIT % 42.3   PLATELETS 10*3/mm3 147     Lab Results   Component Value Date    AST 26 09/24/2019    ALT 14 09/24/2019           IMPRESSION:  Patient with persistent asymptomatic atrial fibrillation presents to [facility] for ECV; procedure, risks, and complications discussed with the patient and he is agreeable to proceed. He has no missed doses of Eliquis in the past 30 days.    PLAN:  Procedure to perform: ECV  ECG in 5 days  Follow up with ROSALBA Escobar for previously scheduled appt on 5/31/2022    Electronically signed by ROSALBA Mcmahon, 01/16/23, 11:23 AM EST.

## 2023-01-17 ENCOUNTER — TELEPHONE (OUTPATIENT)
Dept: CARDIOLOGY | Facility: CLINIC | Age: 78
End: 2023-01-17
Payer: MEDICARE

## 2023-01-17 LAB
QT INTERVAL: 472 MS
QTC INTERVAL: 421 MS

## 2023-03-01 ENCOUNTER — DOCUMENTATION (OUTPATIENT)
Dept: CARDIOLOGY | Facility: CLINIC | Age: 78
End: 2023-03-01
Payer: MEDICARE

## 2023-03-01 NOTE — PROGRESS NOTES
I tried to call the patient but he did not answer and I left a message for him to call me back.  On his mobile cardiac outpatient telemetry monitor he did not have any evidence of atrial fibrillation, SVT, pauses, heart block, or ventricular tachycardia.  Heart rate ranged between  bpm with average 61 bpm.  He did have 9% PAC burden and less than 1% PVC burden.  I would recommend to continue current cardiac medications and to continue CPAP nightly.  The patient has an upcoming appointment with Dr. Esparza 4/2623 and with me on 5/31/2023.    Electronically signed by ROSALBA Mcmahon, 03/01/23, 3:55 PM EST.

## 2023-03-03 ENCOUNTER — TELEPHONE (OUTPATIENT)
Dept: CARDIOLOGY | Facility: CLINIC | Age: 78
End: 2023-03-03
Payer: MEDICARE

## 2023-03-03 NOTE — TELEPHONE ENCOUNTER
"Called patient regarding [place] results below. All questions answered at this time. Patient verbalizes understanding and agreeable to plan.             \"I tried to call the patient but he did not answer and I left a message for him to call me back.  On his mobile cardiac outpatient telemetry monitor he did not have any evidence of atrial fibrillation, SVT, pauses, heart block, or ventricular tachycardia.  Heart rate ranged between  bpm with average 61 bpm.  He did have 9% PAC burden and less than 1% PVC burden.  I would recommend to continue current cardiac medications and to continue CPAP nightly.  The patient has an upcoming appointment with Dr. Esparza 4/2623 and with me on 5/31/2023.     Electronically signed by ROSALBA Mcmahon, 03/01/23, 3:55 PM EST.\"  "

## 2023-04-26 ENCOUNTER — OFFICE VISIT (OUTPATIENT)
Dept: CARDIOLOGY | Facility: CLINIC | Age: 78
End: 2023-04-26
Payer: MEDICARE

## 2023-04-26 VITALS
BODY MASS INDEX: 29.01 KG/M2 | HEART RATE: 60 BPM | SYSTOLIC BLOOD PRESSURE: 122 MMHG | OXYGEN SATURATION: 98 % | WEIGHT: 202.6 LBS | DIASTOLIC BLOOD PRESSURE: 66 MMHG | HEIGHT: 70 IN

## 2023-04-26 DIAGNOSIS — Z99.89 OBSTRUCTIVE SLEEP APNEA ON CPAP: ICD-10-CM

## 2023-04-26 DIAGNOSIS — R09.89 LABILE HYPERTENSION: ICD-10-CM

## 2023-04-26 DIAGNOSIS — I48.19 ATRIAL FIBRILLATION, PERSISTENT: Primary | ICD-10-CM

## 2023-04-26 DIAGNOSIS — G47.33 OBSTRUCTIVE SLEEP APNEA ON CPAP: ICD-10-CM

## 2023-04-26 NOTE — PROGRESS NOTES
Luis Gamboa  1945  [phone]    04/26/2023    [facility] CARDIOLOGY MAIN CAMPUS     Referring Provider: No ref. provider found     Anand De Souza MD  [address] [place] [address]    Chief Complaint   Patient presents with   • Atrial fibrillation, persistent     3 months follow        Problem List:     1. Persistent Atrial Fibrillation   a. CHADSvasc = 5 started Eliquis 11/2022  b. Stress echocardiogram 9/12/2019: Estimated EF = 65%.Left ventricular systolic function is normal. Acceptable negative echocardiographic exercise stress test without anginal type chest discomfort, definitive ischemic EKG changes, or echocardiographic regional wall motion abnormality with preserved systolic left ventricular function following exercise to 85% predicted maximal heart rate and 88% predicted exercise capacity.Overall, low probability for significant focal obstructive coronary artery disease with preserved systolic left ventricular function  c. Diagnosed November 2022 in office  d. Event Monitor 11/10-11/24/2022: 100% AFIB with average HR 83 bpm  e. Echocardiogram 12/15/2022: EF 55%, no significant valve disease  f. ECV to NSR 1/16/2023  g. Event Monitor 1/16-2/14/2023: NO AFIB, SVT, bradycardia. 9% PAC, Less than 1% PVCs, average HR 61 bpm  2. HTN  3. HLP  4. ZAY - on CPAP  5. GERD  6. Remote left LE DVT  7. BPH  8. Crohn's disease with partial colectomy and colostomy -Dr. Lester,   9. Anxiety/depression  10. Surgical History:  a. Partial colectomy  b. Hand surgery  c. Lithotripsy     Allergies  Allergies   Allergen Reactions   • Imuran [Azathioprine] GI Intolerance      hemorrhagic urticaria.  Bleeding        Current Medications    Current Outpatient Medications:   •  alfuzosin (UROXATRAL) 10 MG 24 hr tablet, Take 1 tablet by mouth Daily., Disp: , Rfl:   •  amLODIPine (NORVASC) 5 MG tablet, Take 1 tablet by mouth Daily., Disp: 90 tablet, Rfl: 3  •  apixaban (ELIQUIS) 5 MG tablet tablet,  Take 1 tablet by mouth 2 (Two) Times a Day., Disp: 60 tablet, Rfl: 5  •  Budesonide (ENTOCORT EC) 3 MG 24 hr capsule, Take 2 capsules by mouth Take As Directed. Every 4th day, Disp: , Rfl:   •  Calcium Carbonate (CALCIUM 600 PO), Take  by mouth Daily., Disp: , Rfl:   •  diphenoxylate-atropine (LOMOTIL) 2.5-0.025 MG per tablet, Take 1 tablet by mouth As Needed for Diarrhea., Disp: , Rfl:   •  finasteride (PROSCAR) 5 MG tablet, Take 1 tablet by mouth Daily., Disp: , Rfl:   •  glucosamine sulfate 500 MG capsule capsule, Take 2 capsules by mouth Daily., Disp: , Rfl:   •  immune globulin, human, 1 GM/5ML solution, Inject  under the skin into the appropriate area as directed 1 (One) Time Per Week. Infusion, Disp: , Rfl:   •  latanoprost (XALATAN) 0.005 % ophthalmic solution, Administer 1 drop to both eyes Every Night., Disp: , Rfl:   •  magnesium oxide (MAGOX) 400 (241.3 MG) MG tablet tablet, Take 500 mg by mouth Daily., Disp: , Rfl:   •  Multiple Vitamins-Minerals (MULTIVITAMIN ADULT PO), Take  by mouth Daily., Disp: , Rfl:   •  Omega-3 Fatty Acids (FISH OIL) 1000 MG capsule capsule, Take  by mouth Daily With Breakfast., Disp: , Rfl:   •  omeprazole (priLOSEC) 20 MG capsule, Take 1 capsule by mouth Daily., Disp: , Rfl:   •  ondansetron ODT (ZOFRAN-ODT) 4 MG disintegrating tablet, Take 1 tablet by mouth Every 6 (Six) Hours As Needed for Nausea or Vomiting., Disp: 15 tablet, Rfl: 0  •  probiotic (CULTURELLE) capsule capsule, Take  by mouth Daily., Disp: , Rfl:   •  sertraline (ZOLOFT) 50 MG tablet, Take 1 tablet by mouth Daily., Disp: , Rfl:   •  SIMVASTATIN PO, Take 20 mg by mouth Daily., Disp: , Rfl:   •  tamsulosin (FLOMAX) 0.4 MG capsule 24 hr capsule, Take 1 capsule by mouth Daily., Disp: , Rfl:   •  vitamin C (ASCORBIC ACID) 500 MG tablet, Take 1 tablet by mouth Daily., Disp: , Rfl:   •  zolpidem (AMBIEN) 10 MG tablet, Take 1 tablet by mouth At Night As Needed for Sleep., Disp: , Rfl:     History of Present Illness  "    Pt presents for follow up of atrial fibrillation s/p ECV 1/2023. Since we last saw the pt, he has remained feeling fatigued despite being back in NSR. He has worn a monitor in January-Feb after his ECV which showed NSR with some PACs (9%). He cannot tell if he has had any atrial fibrillation as he does not feel any palpitations. He denies RANDHAWA, CP, LH, and dizziness. Denies any hospitalizations, ER visits, bleeding issues on Eliquis, or TIA/CVA symptoms. Overall feels fine for the most part, just fatigued which he states he has felt that way for several years. BP is well controlled at home. HR mostly in the 60s at home.     ROS:  General:  + fatigue,-  weight gain or loss  Cardiovascular:  Denies CP, PND, syncope, near syncope, edema or palpitations.  Pulmonary:  Denies RANDHAWA, cough, or wheezing      Vitals:    04/26/23 0947   BP: 122/66   BP Location: Left arm   Patient Position: Sitting   Cuff Size: Adult   Pulse: 60   SpO2: 98%   Weight: 91.9 kg (202 lb 9.6 oz)   Height: 177.8 cm (70\")     Body mass index is 29.07 kg/m².  PE:  General: NAD. A & O x 3   Neck: no JVD, no carotid bruits, no TM  Heart RRR, NL S1, S2, S4 present, no rubs, murmurs  Lungs: CTA, no wheezes, rhonchi, or rales  Abd: soft, non-tender, NL BS  Ext: No musculoskeletal deformities, no edema, cyanosis, or clubbing  Psych: normal mood and affect    Diagnostic Data:        ECG 12 Lead    Date/Time: 4/26/2023 10:08 AM  Performed by: Elana Jaramillo PA  Authorized by: Elana Jaramillo PA   Comparison: compared with previous ECG from 1/16/2023  Similar to previous ECG  Rhythm: sinus rhythm  BPM: 60                     1. Atrial fibrillation, persistent    2. Obstructive sleep apnea on CPAP    3. Labile hypertension          Plan:  1. Persistent Atrial Fibrillation:  - s/p ECV to NSR 1/2023. He is maintaining NSR.  - CHADSVasc = 5 on Eliquis   -Will continue for now. In the future, may be able to stop once maintaining NSR in setting of his Crohn's " disease and risk for GI bleeding. Or could consider Watchman Device if needed in the future.     2. ZAY:  - on CPAP     3. HTN:  - controlled.     F/up in 6 months    Electronically signed by PAULO Da Silva, 04/26/23, 10:07 AM EDT.

## 2023-05-22 NOTE — PROGRESS NOTES
Subjective:     Encounter Date:05/31/2023      Patient ID: Luis Gamboa is a 77 y.o.  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      INTERNIST: Anand De Souza MD  [facility] PHYSICIAN: Kay Cannon MD  COLORECTAL SURGEON: Alfonso Lester MD  IMMUNOLOGIST:  Deysi Smith MD .    Chief Complaint:   Chief Complaint   Patient presents with   • Hypertensive heart disease without heart failure     Problem List:  1. 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 Remote progressive exertional dyspnea/chest pain syndrome with acceptable GXT and borderline abnormal acceptable Holter monitor, April 2009.  b. Recurrent progressive NYHA class III symptoms with abnormal EKG and acceptable chest x-ray with essentially normal right dominant coronary artery and preserved systolic LV dysfunction, LVEF (0.66) with continued medical therapy felt warranted, July 2009.   c. CCS class 0 chest discomfort/NYHA class II-III fatigue symptoms with abnormal monitor demonstrating bradycardia, August 2019.   d. Stress echocardiogram 9/12/2019: Estimated EF = 65%.Left ventricular systolic function is normal.There is no evidence of pericardial effusion.Acceptable negative echocardiographic exercise stress test without anginal type chest discomfort, definitive ischemic EKG changes, or echocardiographic regional wall motion abnormality with preserved systolic left ventricular function following exercise to 85% predicted maximal heart rate and 88% predicted exercise capacity.Overall, current study suggests low probability for significant focal obstructive coronary artery disease with preserved systolic left ventricular function  e. Echocardiogram 12/15/2022: LVEF 55%, LV wall thickness consistent with hypertrophy, cardiac valves are anatomically and functionally normal, RVSP less than 35 mmHg, atrial fibrillation noted throughout study  f. Residual class I symptoms,  August 2020, March 2021, October 2021, November 2022, May 2023  2. Bradycardia with Holter monitor 8/20/2019: Average heart rate was 52 bpm, heart rate less than 50 bpm 68% the time but there were no pauses and only 9 PVCs and 2200 PACs which represent 3% total heartbeats with no atrial fibrillation  3. Moderate dyslipidemia.   4. Labile hypertension.  5. Mildly overweight, BMI 28.96.  6. Obstructive sleep apnea with intermittent CPAP therapy (did not tolerate).   7. Hiatal hernia/probable GERD, April 2009.  8. Remote left calf DVT/Coumadin therapy - data deficit.   9. Chronic lower tract obstructive symptoms/probable BPH.   10. Crohn’s disease with partial colectomy and colostomy - data deficit.   11. 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   12. Chronic intermittent anxiety/remote depression.   13. Chronic insomnia.   14. Intermittent allergic rhinitis.   15. Abnormal CT abdomen/pelvis demonstrating a 3.2 x 4.2 cm mass in the leftward retroperitoneum with attenuation coefficient consistent with a solid, noncystic mass, September 2019  16. Paroxysmal atrial fibrillation  a. Diagnosed November 2022 in office, asymptomatic  b. CHADsVasc 3, started Eliquis  c. Holter monitor showed 100% atrial for burden with heart rate ranged between  bpm with average 83 bpm with occasional PVCs  d. Successful external cardioversion 1/16/2023  e. Mobile cardiac outpatient telemetry monitor March 2023 with no evidence of atrial fibrillation, SVT, pauses, heart block, or VT.  Heart rate ranged between  bpm with average 61 bpm, 9% PAC burden, less than 1% PVC burden  17. Remote operations:  a. Partial colectomy with colostomy, 2001.   b. Hand surgery secondary to trauma, 2004.   c. Lithotripsy September 2019    Allergies   Allergen Reactions   • Imuran [Azathioprine] GI Intolerance      hemorrhagic urticaria.  Bleeding        Current Outpatient Medications   Medication Instructions   • alfuzosin (UROXATRAL)  10 mg, Oral, Daily   • amLODIPine (NORVASC) 5 mg, Oral, Daily   • apixaban (ELIQUIS) 5 mg, Oral, 2 Times Daily   • Budesonide (ENTOCORT EC) 6 mg, Oral, Take As Directed, Every 4th day   • Calcium Carbonate (CALCIUM 600 PO) Oral, Daily   • diphenoxylate-atropine (LOMOTIL) 2.5-0.025 MG per tablet 1 tablet, Oral, As Needed   • finasteride (PROSCAR) 5 mg, Oral, Daily   • glucosamine sulfate 1,000 mg, Oral, Daily   • immune globulin, human, 1 GM/5ML solution Subcutaneous, Weekly, Infusion    • latanoprost (XALATAN) 0.005 % ophthalmic solution 1 drop, Both Eyes, Nightly   • magnesium oxide (MAGOX) 500 mg, Oral, Daily   • Multiple Vitamins-Minerals (MULTIVITAMIN ADULT PO) Oral, Daily   • Omega-3 Fatty Acids (FISH OIL) 1000 MG capsule capsule Oral, Daily With Breakfast   • omeprazole (PRILOSEC) 20 mg, Oral, Daily   • ondansetron ODT (ZOFRAN-ODT) 4 mg, Oral, Every 6 Hours PRN   • probiotic (CULTURELLE) capsule capsule Oral, Daily   • sertraline (ZOLOFT) 50 mg, Oral, Daily   • SIMVASTATIN PO 20 mg, Oral, Daily   • tamsulosin (FLOMAX) 0.4 MG capsule 24 hr capsule 1 capsule, Oral, Daily   • vitamin C (ASCORBIC ACID) 500 mg, Oral, Daily   • zolpidem (AMBIEN) 10 mg, Oral, Nightly PRN         HISTORY OF PRESENT ILLNESS:  The patient is here for 6-month follow-up.  At his last appointment he was incidentally found to be in atrial fibrillation and started on Eliquis.  Referral was made to EP for consideration of Watchman device because he is not a good candidate for long-term anticoagulation due to risk for GI bleed with Crohn's.  He has been thinking about it and has decided that he wants to proceed with this procedure and is supposed to follow-up with EP. He had a successful external cardioversion January 2023.  Mobile cardiac outpatient telemetry monitor post cardioversion did not show any atrial fibrillation.  He denies any chest pain, shortness of breath, palpitations, dizziness, presyncope, or syncope.  He has been busy on the  "farm baling hay.  Blood pressures have been WNL.      ROS   All other systems reviewed and otherwise negative.    Procedures       Objective:       Vitals:    05/31/23 1414 05/31/23 1417   BP: 132/71 128/74   BP Location: Left arm Left arm   Patient Position: Sitting Standing   Cuff Size: Adult Adult   Pulse: 51 54   SpO2: 97% 97%   Weight: 91.5 kg (201 lb 12.8 oz)    Height: 177.8 cm (70\")      Body mass index is 28.96 kg/m².  Last weight November 2022 was 199 pounds  Constitutional:       Appearance: Healthy appearance. Not in distress.   Neck:      Vascular: No JVR. JVD normal.   Pulmonary:      Effort: Pulmonary effort is normal.      Breath sounds: Normal breath sounds. No wheezing. No rhonchi. No rales.   Chest:      Chest wall: Not tender to palpatation.   Cardiovascular:      PMI at left midclavicular line. Normal rate. Regular rhythm. Normal S1. Normal S2.      Murmurs: There is a grade 1/6 systolic murmur at the URSB.      No gallop. No click. No rub.   Pulses:     Dorsalis pedis: 1+ bilaterally.     Posterior tibial: 1+ bilaterally.  Edema:     Peripheral edema absent.   Abdominal:      General: Bowel sounds are normal.      Palpations: Abdomen is soft.      Tenderness: There is no abdominal tenderness.   Musculoskeletal: Normal range of motion.         General: No tenderness. Skin:     General: Skin is warm and dry.   Neurological:      General: No focal deficit present.      Mental Status: Alert and oriented to person, place and time.           Lab Review:   Lab Results   Component Value Date    GLUCOSE 88 01/16/2023    BUN 13 01/16/2023    CREATININE 1.13 01/16/2023    EGFRIFNONA 49 12/17/2021    EGFRIFAFRI 60 12/17/2021    BCR 11.5 01/16/2023    CO2 30.0 (H) 01/16/2023    CALCIUM 8.9 01/16/2023    ALBUMIN 4.20 09/24/2019    AST 26 09/24/2019    ALT 14 09/24/2019       Lab Results   Component Value Date    WBC 4.25 01/16/2023    HGB 14.3 01/16/2023    HCT 42.3 01/16/2023    .7 (H) 01/16/2023    "  01/16/2023   10/14/2022:  • CBC: WBC 3.6, RBC 4.36, hemoglobin 14.7, hematocrit 44.6, .3, MCH 33.7, MCHC 33, platelets 144, RDW 12.7, MPV 10  • Vitamin D-73.9  • Lipid panel: Cholesterol 147, triglycerides 85, HDL 59, LDL 78  • CMP: Sodium 142, potassium 4.8, chloride 105, carbon dioxide 29, glucose 90, BUN 16, creatinine 1.13, GFR 67, calcium 9, protein 6.9, albumin 4.1, bilirubin 0.6, AST 28, ALT 18, alkaline phosphatase 66             Assessment:     Overall continued acceptable course with no new interim cardiopulmonary complaints with acceptable functional status. We will defer additional diagnostic or therapeutic intervention from a cardiac perspective at this time.  The patient is interested in having the Watchman device and will follow-up with EP.       Diagnosis Plan   1. Atrial fibrillation, persistent   No recurrent PAF status post ECV January 2023, continue Eliquis, patient interested in Watchman procedure and will follow-up with EP      2. Hypertensive heart disease without heart failure  No recurrent angina pectoris or CHF on current activity schedule; continue current treatment      3. Obstructive sleep apnea on CPAP   Intolerant to CPAP      4. Labile hypertension   Controlled, continue current cardiac medications             Plan:         1. Patient to continue current medications and close follow up with the above providers.  2. Tentative cardiology follow up in December 2023 or patient may return sooner PRN.      Electronically signed by ROSALBA Luo, 05/31/23, 4:29 PM EDT.

## 2023-05-31 ENCOUNTER — OFFICE VISIT (OUTPATIENT)
Dept: CARDIOLOGY | Facility: CLINIC | Age: 78
End: 2023-05-31

## 2023-05-31 VITALS
DIASTOLIC BLOOD PRESSURE: 74 MMHG | HEIGHT: 70 IN | SYSTOLIC BLOOD PRESSURE: 128 MMHG | HEART RATE: 54 BPM | BODY MASS INDEX: 28.89 KG/M2 | OXYGEN SATURATION: 97 % | WEIGHT: 201.8 LBS

## 2023-05-31 DIAGNOSIS — I48.19 ATRIAL FIBRILLATION, PERSISTENT: Primary | ICD-10-CM

## 2023-05-31 DIAGNOSIS — G47.33 OBSTRUCTIVE SLEEP APNEA ON CPAP: ICD-10-CM

## 2023-05-31 DIAGNOSIS — Z99.89 OBSTRUCTIVE SLEEP APNEA ON CPAP: ICD-10-CM

## 2023-05-31 DIAGNOSIS — R09.89 LABILE HYPERTENSION: ICD-10-CM

## 2023-05-31 DIAGNOSIS — I11.9 HYPERTENSIVE HEART DISEASE WITHOUT HEART FAILURE: ICD-10-CM

## 2023-05-31 PROCEDURE — 1159F MED LIST DOCD IN RCRD: CPT | Performed by: NURSE PRACTITIONER

## 2023-05-31 PROCEDURE — 99214 OFFICE O/P EST MOD 30 MIN: CPT | Performed by: NURSE PRACTITIONER

## 2023-05-31 PROCEDURE — 1160F RVW MEDS BY RX/DR IN RCRD: CPT | Performed by: NURSE PRACTITIONER

## 2023-05-31 PROCEDURE — 3078F DIAST BP <80 MM HG: CPT | Performed by: NURSE PRACTITIONER

## 2023-05-31 PROCEDURE — 3074F SYST BP LT 130 MM HG: CPT | Performed by: NURSE PRACTITIONER

## 2023-08-28 NOTE — TELEPHONE ENCOUNTER
Patient's wife called stating that patient was having severe right leg pain. Patient had a cardioversion yesterday and has been on eliquis since 11/2022. Advice patient to go to the ER if pain is severe and limiting his activity. Patient's wife concerned for a clot, recommended going to the ER because they will be able to assess for that better. Patient's wife verbalized understanding and and will call if they have any more questions.   
No.

## 2023-10-17 DIAGNOSIS — I48.19 ATRIAL FIBRILLATION, PERSISTENT: Primary | ICD-10-CM

## 2023-10-17 DIAGNOSIS — K50.10 CROHN'S DISEASE OF LARGE INTESTINE WITHOUT COMPLICATION: ICD-10-CM

## 2023-10-23 ENCOUNTER — DOCUMENTATION (OUTPATIENT)
Dept: CARDIOLOGY | Facility: HOSPITAL | Age: 78
End: 2023-10-23
Payer: MEDICARE

## 2023-10-23 DIAGNOSIS — I48.19 ATRIAL FIBRILLATION, PERSISTENT: Primary | ICD-10-CM

## 2023-10-23 RX ORDER — ASPIRIN 81 MG/1
81 TABLET ORAL DAILY
Qty: 90 TABLET | Refills: 0 | Status: SHIPPED | OUTPATIENT
Start: 2023-11-13

## 2023-10-23 NOTE — PROGRESS NOTES
PRE-WATCHMAN HISTORY  Luis Gamboa 1945   [address] [address]   [phone] (home)     Referring MD: ROSALBA Escobar  Information obtained from: [x] Medical record review  [x] Patient report  Scheduled for: Watchman implant on 11/13/23 with Dr. Esparza  [facility] Visit: ROSALBA Escobar    History & Risk Factors (prior to Watchman implant)  [id number] Risk Factors:  Congestive HF     [x] No   [] Yes  LV Dysfunction   [x] No   [] Yes  Hypertension      [] No   [x] Yes  Diabetes    [x] No   [] Yes  Stroke       [x] No   [] Yes  TIA       [x] No   [] Yes  Thromboembolism    [] No   [x] Yes-DVT  Vascular Disease   [x] No   [] Yes    HAS-BLED Risk Factors:  HTN (uncontrolled) [x] No  [] Yes  Abnormal Renal Fxn  [x] No  [] Yes  Abnormal Liver Fxn   [x] No  [] Yes  Stroke            [x] No  [] Yes  Bleeding event [x] No  [] Yes  Labile INR      [x] No  [] Yes  Alcohol  [x] No  [] Yes  Antiplatelets      [x] No  [] Yes  NSAIDS  [x] No  [] Yes    Additional Stroke & Bleeding Risk Factors:  Increased Fall Risk   [x] No  [] Yes  Bleeding Event         [x] No  [] Yes      If Yes, Type      [] Intracranial [] Epistaxis   [x] GI   [x] Other- GI bleeding risk with Crohn's     Rhythm History:  AFIBTYPE: persistent    Valvular AFib        [x] No  [] Yes  Attempt at AFib Termination:  [] No  [x] Yes       If Yes, pharmacologic CV    [x] No  [] Yes       If Yes, DC cardioversion  [] No  [x] Yes       If Yes, catheter ablation  [x] No  [] Yes           If Yes, surgical ablation  [x] No  [] Yes         Atrial Flutter    [x] No  [] Yes  NGA Intervention    [x] No  [] Yes    Additional History & Risk Factors:  Cardiomyopathy   [x] No  [] Yes  Chronic Lung Disease  [x] No  [] Yes  Coronary Artery Disease  [x] No  [] Yes  Sleep Apnea    [] No  [x] Yes-intermittent       If Yes, compliant with treatment [x] No  [] Yes  Epicardial Approach Considered [x] No  [] Yes    Diagnostic Studies:  Last  Echo:  [x] TTE Date: 12/15/2022                  EF: 55%             LA: 2.8cm            VHD: trace MR          Pre-procedure blood thinner medications:  Hold 1 dose apixaban prior to procedure. Start 81mg ASA day of procedure.          10/23/23 14:22 EDT   HEATHER Revised 1.31.2017

## 2023-11-07 ENCOUNTER — PREP FOR SURGERY (OUTPATIENT)
Dept: OTHER | Facility: HOSPITAL | Age: 78
End: 2023-11-07
Payer: MEDICARE

## 2023-11-07 DIAGNOSIS — I48.19 ATRIAL FIBRILLATION, PERSISTENT: Primary | ICD-10-CM

## 2023-11-07 RX ORDER — SODIUM CHLORIDE 9 MG/ML
1 INJECTION, SOLUTION INTRAVENOUS CONTINUOUS
Status: CANCELLED | OUTPATIENT
Start: 2023-11-07 | End: 2023-11-07

## 2023-11-07 RX ORDER — SODIUM CHLORIDE 0.9 % (FLUSH) 0.9 %
1-10 SYRINGE (ML) INJECTION AS NEEDED
Status: CANCELLED | OUTPATIENT
Start: 2023-11-07

## 2023-11-07 RX ORDER — SODIUM CHLORIDE 9 MG/ML
40 INJECTION, SOLUTION INTRAVENOUS AS NEEDED
Status: CANCELLED | OUTPATIENT
Start: 2023-11-07

## 2023-11-07 RX ORDER — ONDANSETRON 2 MG/ML
4 INJECTION INTRAMUSCULAR; INTRAVENOUS EVERY 6 HOURS PRN
Status: CANCELLED | OUTPATIENT
Start: 2023-11-07

## 2023-11-07 RX ORDER — ACETAMINOPHEN 325 MG/1
650 TABLET ORAL EVERY 4 HOURS PRN
Status: CANCELLED | OUTPATIENT
Start: 2023-11-07

## 2023-11-07 RX ORDER — NITROGLYCERIN 0.4 MG/1
0.4 TABLET SUBLINGUAL
Status: CANCELLED | OUTPATIENT
Start: 2023-11-07

## 2023-11-07 RX ORDER — CEFAZOLIN SODIUM 2 G/100ML
2000 INJECTION, SOLUTION INTRAVENOUS ONCE
Status: CANCELLED | OUTPATIENT
Start: 2023-11-07 | End: 2023-11-07

## 2023-11-07 RX ORDER — SODIUM CHLORIDE 0.9 % (FLUSH) 0.9 %
10 SYRINGE (ML) INJECTION EVERY 12 HOURS SCHEDULED
Status: CANCELLED | OUTPATIENT
Start: 2023-11-07

## 2023-11-08 ENCOUNTER — OFFICE VISIT (OUTPATIENT)
Dept: CARDIOLOGY | Facility: CLINIC | Age: 78
End: 2023-11-08
Payer: MEDICARE

## 2023-11-08 ENCOUNTER — PRE-ADMISSION TESTING (OUTPATIENT)
Dept: PREADMISSION TESTING | Facility: HOSPITAL | Age: 78
End: 2023-11-08
Payer: MEDICARE

## 2023-11-08 VITALS
SYSTOLIC BLOOD PRESSURE: 140 MMHG | WEIGHT: 199.8 LBS | HEART RATE: 65 BPM | OXYGEN SATURATION: 96 % | DIASTOLIC BLOOD PRESSURE: 90 MMHG | BODY MASS INDEX: 28.6 KG/M2 | HEIGHT: 70 IN

## 2023-11-08 DIAGNOSIS — I11.9 HYPERTENSIVE HEART DISEASE WITHOUT HEART FAILURE: ICD-10-CM

## 2023-11-08 DIAGNOSIS — G47.33 OBSTRUCTIVE SLEEP APNEA ON CPAP: ICD-10-CM

## 2023-11-08 DIAGNOSIS — I48.19 ATRIAL FIBRILLATION, PERSISTENT: ICD-10-CM

## 2023-11-08 DIAGNOSIS — I48.19 ATRIAL FIBRILLATION, PERSISTENT: Primary | ICD-10-CM

## 2023-11-08 LAB
ANION GAP SERPL CALCULATED.3IONS-SCNC: 9 MMOL/L (ref 5–15)
BUN SERPL-MCNC: 20 MG/DL (ref 8–23)
BUN/CREAT SERPL: 17.9 (ref 7–25)
CALCIUM SPEC-SCNC: 9.1 MG/DL (ref 8.6–10.5)
CHLORIDE SERPL-SCNC: 102 MMOL/L (ref 98–107)
CO2 SERPL-SCNC: 29 MMOL/L (ref 22–29)
CREAT SERPL-MCNC: 1.12 MG/DL (ref 0.76–1.27)
DEPRECATED RDW RBC AUTO: 50.4 FL (ref 37–54)
EGFRCR SERPLBLD CKD-EPI 2021: 67.7 ML/MIN/1.73
ERYTHROCYTE [DISTWIDTH] IN BLOOD BY AUTOMATED COUNT: 13.3 % (ref 12.3–15.4)
GLUCOSE SERPL-MCNC: 112 MG/DL (ref 65–99)
HBA1C MFR BLD: 5.4 % (ref 4.8–5.6)
HCT VFR BLD AUTO: 46.2 % (ref 37.5–51)
HGB BLD-MCNC: 15.7 G/DL (ref 13–17.7)
INR PPP: 1.1 (ref 0.89–1.12)
MCH RBC QN AUTO: 34.7 PG (ref 26.6–33)
MCHC RBC AUTO-ENTMCNC: 34 G/DL (ref 31.5–35.7)
MCV RBC AUTO: 102.2 FL (ref 79–97)
PLATELET # BLD AUTO: 165 10*3/MM3 (ref 140–450)
PMV BLD AUTO: 10.2 FL (ref 6–12)
POTASSIUM SERPL-SCNC: 4.8 MMOL/L (ref 3.5–5.2)
PROTHROMBIN TIME: 14.4 SECONDS (ref 12.2–14.5)
RBC # BLD AUTO: 4.52 10*6/MM3 (ref 4.14–5.8)
SODIUM SERPL-SCNC: 140 MMOL/L (ref 136–145)
WBC NRBC COR # BLD: 5.64 10*3/MM3 (ref 3.4–10.8)

## 2023-11-08 PROCEDURE — 83036 HEMOGLOBIN GLYCOSYLATED A1C: CPT

## 2023-11-08 PROCEDURE — 80048 BASIC METABOLIC PNL TOTAL CA: CPT

## 2023-11-08 PROCEDURE — 85610 PROTHROMBIN TIME: CPT

## 2023-11-08 PROCEDURE — 85027 COMPLETE CBC AUTOMATED: CPT

## 2023-11-08 PROCEDURE — 36415 COLL VENOUS BLD VENIPUNCTURE: CPT

## 2023-11-08 NOTE — PAT
An arrival time for procedure was not provided during PAT visit. If patient had any questions or concerns about their arrival time, they were instructed to contact their surgeon/physician.  Additionally, if the patient referred to an arrival time that was acquired from their my chart account, patient was encouraged to verify that time with their surgeon/physician. Arrival times are NOT provided in Pre Admission Testing Department.    Patient denies any current skin issues. COLOSTOMY IN PLACE

## 2023-11-08 NOTE — PROGRESS NOTES
Luis Gamboa  1945  [phone]    11/08/2023    [facility] CARDIOLOGY     Referring Provider: No ref. provider found     Anand De Souza MD  [address] [place] [address]    Chief Complaint   Patient presents with    Atrial fibrillation, persistent       Problem List:      Persistent Atrial Fibrillation   CHADSvasc = 5 started Eliquis 11/2022  Stress echocardiogram 9/12/2019: Estimated EF = 65%.Left ventricular systolic function is normal. Acceptable negative echocardiographic exercise stress test without anginal type chest discomfort, definitive ischemic EKG changes, or echocardiographic regional wall motion abnormality with preserved systolic left ventricular function following exercise to 85% predicted maximal heart rate and 88% predicted exercise capacity.Overall, low probability for significant focal obstructive coronary artery disease with preserved systolic left ventricular function  Diagnosed November 2022 in office  Event Monitor 11/10-11/24/2022: 100% AFIB with average HR 83 bpm  Echocardiogram 12/15/2022: EF 55%, no significant valve disease  ECV to NSR 1/16/2023  Event Monitor 1/16-2/14/2023: NO AFIB, SVT, bradycardia. 9% PAC, Less than 1% PVCs, average HR 61 bpm  HTN  HLP  ZAY - on CPAP  GERD  Remote left LE DVT  BPH  Crohn's disease with partial colectomy and colostomy -Dr. Lester,   Anxiety/depression  Surgical History:  Partial colectomy  Hand surgery  Lithotripsy     Allergies  Allergies   Allergen Reactions    Imuran [Azathioprine] GI Intolerance      hemorrhagic urticaria.  Bleeding        Current Medications    Current Outpatient Medications:     amLODIPine (NORVASC) 5 MG tablet, Take 1 tablet by mouth Daily., Disp: 90 tablet, Rfl: 3    apixaban (ELIQUIS) 5 MG tablet tablet, Take 1 tablet by mouth 2 (Two) Times a Day., Disp: 60 tablet, Rfl: 5    Budesonide (ENTOCORT EC) 3 MG 24 hr capsule, Take 2 capsules by mouth Take As Directed. Every 4th day, Disp: , Rfl:      Calcium Carbonate (CALCIUM 600 PO), Take  by mouth Daily., Disp: , Rfl:     diphenoxylate-atropine (LOMOTIL) 2.5-0.025 MG per tablet, Take 1 tablet by mouth As Needed for Diarrhea., Disp: , Rfl:     finasteride (PROSCAR) 5 MG tablet, Take 1 tablet by mouth Daily., Disp: , Rfl:     immune globulin, human, 1 GM/5ML solution, Inject  under the skin into the appropriate area as directed 1 (One) Time Per Week. Infusion, Disp: , Rfl:     latanoprost (XALATAN) 0.005 % ophthalmic solution, Administer 1 drop to both eyes Every Night., Disp: , Rfl:     magnesium oxide (MAGOX) 400 (241.3 MG) MG tablet tablet, Take 500 mg by mouth Daily., Disp: , Rfl:     Multiple Vitamins-Minerals (MULTIVITAMIN ADULT PO), Take  by mouth Daily., Disp: , Rfl:     Omega-3 Fatty Acids (FISH OIL) 1000 MG capsule capsule, Take  by mouth Daily With Breakfast., Disp: , Rfl:     omeprazole (priLOSEC) 20 MG capsule, Take 1 capsule by mouth Daily., Disp: , Rfl:     ondansetron ODT (ZOFRAN-ODT) 4 MG disintegrating tablet, Take 1 tablet by mouth Every 6 (Six) Hours As Needed for Nausea or Vomiting., Disp: 15 tablet, Rfl: 0    sertraline (ZOLOFT) 50 MG tablet, Take 1 tablet by mouth Daily., Disp: , Rfl:     SIMVASTATIN PO, Take 20 mg by mouth Daily., Disp: , Rfl:     tamsulosin (FLOMAX) 0.4 MG capsule 24 hr capsule, Take 1 capsule by mouth Daily., Disp: , Rfl:     vitamin C (ASCORBIC ACID) 500 MG tablet, Take 1 tablet by mouth Daily., Disp: , Rfl:     zolpidem (AMBIEN) 10 MG tablet, Take 1 tablet by mouth At Night As Needed for Sleep., Disp: , Rfl:     History of Present Illness     Pt presents for follow up of afib, last ECV 1/2023. Since we last saw the pt, pt denies any palpitations, SOB, CP, LH, and dizziness. He reports fatigue is baseline, no worse. He does report he does not sleep well but he doesn't always wear his CPAP due to discomfort. He is in the process of trying new masks for better fit. Denies any hospitalizations, ER visits, bleeding, or  "TIA/CVA symptoms. BP is well controlled at home, 120/70 normally.     ROS:  General:  Denies fatigue, weight gain or loss  Cardiovascular:  Denies CP, PND, syncope, near syncope, edema or palpitations.  Pulmonary:  Denies RANDHAWA, cough, or wheezing      Vitals:    11/08/23 1442   BP: 140/90   BP Location: Left arm   Patient Position: Sitting   Cuff Size: Adult   Pulse: 65   SpO2: 96%   Weight: 90.6 kg (199 lb 12.8 oz)   Height: 177.8 cm (70\")     Body mass index is 28.67 kg/m².  PE:  General: NAD  Neck: no JVD, no carotid bruits, no TM  Heart RRR, NL S1, S2, S4 present, no rubs, murmurs  Lungs: CTA, no wheezes, rhonchi, or rales  Abd: soft, non-tender, NL BS  Ext: No musculoskeletal deformities, no edema, cyanosis, or clubbing  Psych: normal mood and affect    Diagnostic Data:      Procedures      1. Atrial fibrillation, persistent    2. Obstructive sleep apnea on CPAP    3. Hypertensive heart disease without heart failure          Plan:  1. Persistent Atrial Fibrillation:  - s/p ECV to NSR 1/2023. He is maintaining NSR.  - CHADSVasc = 5 on Eliquis. In setting of his Crohn's disease and risk for GI bleeding, he is not a good candidate for long term anticoagulation. He is however a good candidate for short term anticoagulation. He is scheduled for Watchman device 11/13/23. The risks, benefits, and alternatives of the procedure have been reviewed and the patient wishes to proceed.      2. ZAY:  - on CPAP      3. HTN:  - controlled.      F/up in 6 months    Electronically signed by ROSALBA Gonzalez, 11/08/23, 3:11 PM EST.      "

## 2023-11-13 ENCOUNTER — ANESTHESIA (OUTPATIENT)
Dept: CARDIOLOGY | Facility: HOSPITAL | Age: 78
DRG: 274 | End: 2023-11-13
Payer: MEDICARE

## 2023-11-13 ENCOUNTER — HOSPITAL ENCOUNTER (INPATIENT)
Facility: HOSPITAL | Age: 78
LOS: 1 days | Discharge: HOME | DRG: 274 | End: 2023-11-13
Attending: INTERNAL MEDICINE | Admitting: INTERNAL MEDICINE
Payer: MEDICARE

## 2023-11-13 ENCOUNTER — APPOINTMENT (OUTPATIENT)
Dept: CARDIOLOGY | Facility: HOSPITAL | Age: 78
DRG: 274 | End: 2023-11-13
Payer: MEDICARE

## 2023-11-13 ENCOUNTER — ANESTHESIA EVENT (OUTPATIENT)
Dept: CARDIOLOGY | Facility: HOSPITAL | Age: 78
DRG: 274 | End: 2023-11-13
Payer: MEDICARE

## 2023-11-13 VITALS
DIASTOLIC BLOOD PRESSURE: 79 MMHG | SYSTOLIC BLOOD PRESSURE: 121 MMHG | TEMPERATURE: 97 F | WEIGHT: 198.63 LBS | OXYGEN SATURATION: 98 % | HEART RATE: 89 BPM | BODY MASS INDEX: 28.44 KG/M2 | RESPIRATION RATE: 11 BRPM | HEIGHT: 70 IN

## 2023-11-13 DIAGNOSIS — I48.0 PAROXYSMAL ATRIAL FIBRILLATION: Primary | ICD-10-CM

## 2023-11-13 DIAGNOSIS — I48.19 ATRIAL FIBRILLATION, PERSISTENT: ICD-10-CM

## 2023-11-13 DIAGNOSIS — K50.10 CROHN'S DISEASE OF LARGE INTESTINE WITHOUT COMPLICATION: ICD-10-CM

## 2023-11-13 PROBLEM — I48.91 AF (ATRIAL FIBRILLATION): Status: ACTIVE | Noted: 2023-11-13

## 2023-11-13 PROCEDURE — 93355 ECHO TRANSESOPHAGEAL (TEE): CPT

## 2023-11-13 PROCEDURE — 25010000002 PROTAMINE SULFATE PER 10 MG: Performed by: INTERNAL MEDICINE

## 2023-11-13 PROCEDURE — C1894 INTRO/SHEATH, NON-LASER: HCPCS | Performed by: INTERNAL MEDICINE

## 2023-11-13 PROCEDURE — C1889 IMPLANT/INSERT DEVICE, NOC: HCPCS | Performed by: INTERNAL MEDICINE

## 2023-11-13 PROCEDURE — C1760 CLOSURE DEV, VASC: HCPCS | Performed by: INTERNAL MEDICINE

## 2023-11-13 PROCEDURE — 25510000001 IOPAMIDOL PER 1 ML: Performed by: INTERNAL MEDICINE

## 2023-11-13 PROCEDURE — 25810000003 SODIUM CHLORIDE 0.9 % SOLUTION

## 2023-11-13 PROCEDURE — C1893 INTRO/SHEATH, FIXED,NON-PEEL: HCPCS | Performed by: INTERNAL MEDICINE

## 2023-11-13 PROCEDURE — 25010000002 PROPOFOL 10 MG/ML EMULSION

## 2023-11-13 PROCEDURE — C1759 CATH, INTRA ECHOCARDIOGRAPHY: HCPCS | Performed by: INTERNAL MEDICINE

## 2023-11-13 PROCEDURE — 33340 PERQ CLSR TCAT L ATR APNDGE: CPT | Performed by: INTERNAL MEDICINE

## 2023-11-13 PROCEDURE — C1889 IMPLANT/INSERT DEVICE, NOC: HCPCS

## 2023-11-13 PROCEDURE — C1769 GUIDE WIRE: HCPCS | Performed by: INTERNAL MEDICINE

## 2023-11-13 PROCEDURE — 25010000002 DEXAMETHASONE PER 1 MG

## 2023-11-13 PROCEDURE — 93662 INTRACARDIAC ECG (ICE): CPT | Performed by: INTERNAL MEDICINE

## 2023-11-13 PROCEDURE — 25010000002 SUGAMMADEX 200 MG/2ML SOLUTION

## 2023-11-13 PROCEDURE — 25810000003 SODIUM CHLORIDE 0.9 % SOLUTION: Performed by: INTERNAL MEDICINE

## 2023-11-13 PROCEDURE — B245ZZ4 ULTRASONOGRAPHY OF LEFT HEART, TRANSESOPHAGEAL: ICD-10-PCS | Performed by: INTERNAL MEDICINE

## 2023-11-13 PROCEDURE — 02L73DK OCCLUSION OF LEFT ATRIAL APPENDAGE WITH INTRALUMINAL DEVICE, PERCUTANEOUS APPROACH: ICD-10-PCS | Performed by: INTERNAL MEDICINE

## 2023-11-13 PROCEDURE — 25010000002 CEFAZOLIN PER 500 MG

## 2023-11-13 PROCEDURE — 25010000002 ESMOLOL 100 MG/10ML SOLUTION

## 2023-11-13 PROCEDURE — 93355 ECHO TRANSESOPHAGEAL (TEE): CPT | Performed by: INTERNAL MEDICINE

## 2023-11-13 PROCEDURE — 25010000002 ONDANSETRON PER 1 MG

## 2023-11-13 PROCEDURE — 25010000002 HEPARIN (PORCINE) PER 1000 UNITS: Performed by: INTERNAL MEDICINE

## 2023-11-13 DEVICE — LEFT ATRIAL APPENDAGE CLOSURE DEVICE WITH DELIVERY SYSTEM
Type: IMPLANTABLE DEVICE | Status: FUNCTIONAL
Brand: WATCHMAN FLX™

## 2023-11-13 RX ORDER — PROPOFOL 10 MG/ML
VIAL (ML) INTRAVENOUS AS NEEDED
Status: DISCONTINUED | OUTPATIENT
Start: 2023-11-13 | End: 2023-11-13 | Stop reason: SURG

## 2023-11-13 RX ORDER — SODIUM CHLORIDE 9 MG/ML
INJECTION, SOLUTION INTRAVENOUS
Status: DISCONTINUED | OUTPATIENT
Start: 2023-11-13 | End: 2023-11-13 | Stop reason: HOSPADM

## 2023-11-13 RX ORDER — SODIUM CHLORIDE 9 MG/ML
1 INJECTION, SOLUTION INTRAVENOUS CONTINUOUS
Status: DISCONTINUED | OUTPATIENT
Start: 2023-11-13 | End: 2023-11-13

## 2023-11-13 RX ORDER — NITROGLYCERIN 0.4 MG/1
0.4 TABLET SUBLINGUAL
Status: DISCONTINUED | OUTPATIENT
Start: 2023-11-13 | End: 2023-11-13 | Stop reason: HOSPADM

## 2023-11-13 RX ORDER — PROTAMINE SULFATE 10 MG/ML
INJECTION, SOLUTION INTRAVENOUS
Status: DISCONTINUED | OUTPATIENT
Start: 2023-11-13 | End: 2023-11-13 | Stop reason: HOSPADM

## 2023-11-13 RX ORDER — SODIUM CHLORIDE 9 MG/ML
40 INJECTION, SOLUTION INTRAVENOUS AS NEEDED
Status: DISCONTINUED | OUTPATIENT
Start: 2023-11-13 | End: 2023-11-13 | Stop reason: HOSPADM

## 2023-11-13 RX ORDER — ACETAMINOPHEN 650 MG/1
650 SUPPOSITORY RECTAL EVERY 4 HOURS PRN
Status: DISCONTINUED | OUTPATIENT
Start: 2023-11-13 | End: 2023-11-13 | Stop reason: HOSPADM

## 2023-11-13 RX ORDER — ACETAMINOPHEN 325 MG/1
650 TABLET ORAL EVERY 4 HOURS PRN
Status: DISCONTINUED | OUTPATIENT
Start: 2023-11-13 | End: 2023-11-13 | Stop reason: HOSPADM

## 2023-11-13 RX ORDER — ONDANSETRON 2 MG/ML
4 INJECTION INTRAMUSCULAR; INTRAVENOUS EVERY 6 HOURS PRN
Status: DISCONTINUED | OUTPATIENT
Start: 2023-11-13 | End: 2023-11-13 | Stop reason: HOSPADM

## 2023-11-13 RX ORDER — ONDANSETRON 2 MG/ML
INJECTION INTRAMUSCULAR; INTRAVENOUS AS NEEDED
Status: DISCONTINUED | OUTPATIENT
Start: 2023-11-13 | End: 2023-11-13 | Stop reason: SURG

## 2023-11-13 RX ORDER — DEXAMETHASONE SODIUM PHOSPHATE 4 MG/ML
INJECTION, SOLUTION INTRA-ARTICULAR; INTRALESIONAL; INTRAMUSCULAR; INTRAVENOUS; SOFT TISSUE AS NEEDED
Status: DISCONTINUED | OUTPATIENT
Start: 2023-11-13 | End: 2023-11-13 | Stop reason: SURG

## 2023-11-13 RX ORDER — HEPARIN SODIUM 1000 [USP'U]/ML
INJECTION, SOLUTION INTRAVENOUS; SUBCUTANEOUS
Status: DISCONTINUED | OUTPATIENT
Start: 2023-11-13 | End: 2023-11-13 | Stop reason: HOSPADM

## 2023-11-13 RX ORDER — SODIUM CHLORIDE 0.9 % (FLUSH) 0.9 %
1-10 SYRINGE (ML) INJECTION AS NEEDED
Status: DISCONTINUED | OUTPATIENT
Start: 2023-11-13 | End: 2023-11-13 | Stop reason: HOSPADM

## 2023-11-13 RX ORDER — ONDANSETRON 2 MG/ML
4 INJECTION INTRAMUSCULAR; INTRAVENOUS ONCE AS NEEDED
Status: DISCONTINUED | OUTPATIENT
Start: 2023-11-13 | End: 2023-11-13 | Stop reason: HOSPADM

## 2023-11-13 RX ORDER — SODIUM CHLORIDE 9 MG/ML
INJECTION, SOLUTION INTRAVENOUS CONTINUOUS PRN
Status: DISCONTINUED | OUTPATIENT
Start: 2023-11-13 | End: 2023-11-13 | Stop reason: SURG

## 2023-11-13 RX ORDER — ESMOLOL HYDROCHLORIDE 10 MG/ML
INJECTION INTRAVENOUS AS NEEDED
Status: DISCONTINUED | OUTPATIENT
Start: 2023-11-13 | End: 2023-11-13 | Stop reason: SURG

## 2023-11-13 RX ORDER — SODIUM CHLORIDE 0.9 % (FLUSH) 0.9 %
10 SYRINGE (ML) INJECTION EVERY 12 HOURS SCHEDULED
Status: DISCONTINUED | OUTPATIENT
Start: 2023-11-13 | End: 2023-11-13 | Stop reason: HOSPADM

## 2023-11-13 RX ORDER — LIDOCAINE HYDROCHLORIDE 10 MG/ML
INJECTION, SOLUTION EPIDURAL; INFILTRATION; INTRACAUDAL; PERINEURAL AS NEEDED
Status: DISCONTINUED | OUTPATIENT
Start: 2023-11-13 | End: 2023-11-13 | Stop reason: SURG

## 2023-11-13 RX ORDER — IPRATROPIUM BROMIDE AND ALBUTEROL SULFATE 2.5; .5 MG/3ML; MG/3ML
3 SOLUTION RESPIRATORY (INHALATION) ONCE AS NEEDED
Status: DISCONTINUED | OUTPATIENT
Start: 2023-11-13 | End: 2023-11-13 | Stop reason: HOSPADM

## 2023-11-13 RX ORDER — ROCURONIUM BROMIDE 10 MG/ML
INJECTION, SOLUTION INTRAVENOUS AS NEEDED
Status: DISCONTINUED | OUTPATIENT
Start: 2023-11-13 | End: 2023-11-13 | Stop reason: SURG

## 2023-11-13 RX ORDER — BUPIVACAINE HCL/0.9 % NACL/PF 0.125 %
PLASTIC BAG, INJECTION (ML) EPIDURAL AS NEEDED
Status: DISCONTINUED | OUTPATIENT
Start: 2023-11-13 | End: 2023-11-13 | Stop reason: SURG

## 2023-11-13 RX ADMIN — Medication 100 MCG: at 13:15

## 2023-11-13 RX ADMIN — PROPOFOL 150 MG: 10 INJECTION, EMULSION INTRAVENOUS at 12:48

## 2023-11-13 RX ADMIN — SODIUM CHLORIDE 2000 MG: 900 INJECTION INTRAVENOUS at 12:46

## 2023-11-13 RX ADMIN — SUGAMMADEX 200 MG: 100 INJECTION, SOLUTION INTRAVENOUS at 12:31

## 2023-11-13 RX ADMIN — SODIUM CHLORIDE: 9 INJECTION, SOLUTION INTRAVENOUS at 12:41

## 2023-11-13 RX ADMIN — Medication 100 MCG: at 13:10

## 2023-11-13 RX ADMIN — DEXAMETHASONE SODIUM PHOSPHATE 4 MG: 4 INJECTION, SOLUTION INTRAMUSCULAR; INTRAVENOUS at 12:54

## 2023-11-13 RX ADMIN — SODIUM CHLORIDE 1 ML/KG/HR: 9 INJECTION, SOLUTION INTRAVENOUS at 11:18

## 2023-11-13 RX ADMIN — ONDANSETRON 4 MG: 2 INJECTION INTRAMUSCULAR; INTRAVENOUS at 12:26

## 2023-11-13 RX ADMIN — ROCURONIUM BROMIDE 50 MG: 10 SOLUTION INTRAVENOUS at 12:49

## 2023-11-13 RX ADMIN — LIDOCAINE HYDROCHLORIDE 50 MG: 10 INJECTION, SOLUTION EPIDURAL; INFILTRATION; INTRACAUDAL; PERINEURAL at 12:48

## 2023-11-13 RX ADMIN — ESMOLOL HYDROCHLORIDE 40 MG: 10 INJECTION, SOLUTION INTRAVENOUS at 12:48

## 2023-11-13 NOTE — Clinical Note
Hemostasis started on the right femoral vein. Vascade MVP was used in achieving hemostasis. Closure device deployed in the vessel. Hemostasis achieved successfully. 21-Aug-2020 06:42

## 2023-11-13 NOTE — ANESTHESIA POSTPROCEDURE EVALUATION
Patient: Luis Gamboa    Procedure Summary       Date: 11/13/23 Room / Location: [facility] CATH/EP LAB G / [facility] EP INVASIVE LOCATION    Anesthesia Start: 1241 Anesthesia Stop: 1339    Procedure: Atrial Appendage Occlusion Diagnosis:       Atrial fibrillation, persistent      Crohn's disease of large intestine without complication      (atrial fibrillation)    Providers: Tevin Esparza MD Provider: Gael Alvarado MD    Anesthesia Type: general ASA Status: 3            Anesthesia Type: general    Vitals  Vitals Value Taken Time   /70 11/13/23 1340   Temp 97 °F (36.1 °C) 11/13/23 1340   Pulse     Resp 14 11/13/23 1340   SpO2 98 % 11/13/23 1340           Post Anesthesia Care and Evaluation    Patient location during evaluation: PACU  Patient participation: complete - patient participated  Level of consciousness: awake and alert  Pain score: 0  Pain management: adequate    Airway patency: patent  Anesthetic complications: No anesthetic complications  PONV Status: none  Cardiovascular status: hemodynamically stable and acceptable  Respiratory status: nonlabored ventilation, acceptable and nasal cannula  Hydration status: acceptable

## 2023-11-13 NOTE — ANESTHESIA PREPROCEDURE EVALUATION
Anesthesia Evaluation     Patient summary reviewed and Nursing notes reviewed                Airway   Mallampati: II  TM distance: >3 FB  Neck ROM: full  No difficulty expected  Dental - normal exam     Pulmonary - normal exam   (+) a smoker (1992) Former,sleep apnea on CPAP  Cardiovascular - normal exam    (+) hypertension, dysrhythmias Paroxysmal Atrial Fib, hyperlipidemia    ROS comment:   Echo 12/22: normal EF, no significant valve disease    Stress test 9/19: negative for ischemia; low risk study    Neuro/Psych- negative ROS  GI/Hepatic/Renal/Endo    (+) GERD    Musculoskeletal (-) negative ROS    Abdominal  - normal exam    Bowel sounds: normal.   Substance History - negative use     OB/GYN negative ob/gyn ROS         Other                      Anesthesia Plan    ASA 3     general     intravenous induction     Anesthetic plan, risks, benefits, and alternatives have been provided, discussed and informed consent has been obtained with: patient.    Plan discussed with CRNA.    CODE STATUS:

## 2023-11-13 NOTE — ANESTHESIA PROCEDURE NOTES
Airway  Urgency: elective    Date/Time: 11/13/2023 12:50 PM  Airway not difficult    General Information and Staff    Patient location during procedure: OR  CRNA/CAA: Alfonso Rodriguez CRNA    Indications and Patient Condition  Indications for airway management: airway protection    Preoxygenated: yes  MILS not maintained throughout  Mask difficulty assessment: 1 - vent by mask    Final Airway Details  Final airway type: endotracheal airway      Successful airway: ETT  Cuffed: yes   Successful intubation technique: video laryngoscopy  Facilitating devices/methods: intubating stylet  Endotracheal tube insertion site: oral  Blade: Morfin  Blade size: 3  ETT size (mm): 7.5  Cormack-Lehane Classification: grade I - full view of glottis  Placement verified by: chest auscultation and capnometry   Cuff volume (mL): 10  Measured from: lips  ETT/EBT  to lips (cm): 21  Number of attempts at approach: 1  Assessment: lips, teeth, and gum same as pre-op and atraumatic intubation    Additional Comments  Negative epigastric sounds, Breath sound equal bilaterally with symmetric chest rise and fall

## 2023-11-13 NOTE — H&P
Cardiology H&P     Luis Gamboa  1945  [phone]  There is no work phone number on file.    11/13/23    DATE OF ADMISSION: 11/13/2023  [facility]    Anand De Souza MD  [address] / [facility] [address]  Referring Provider: Tevin Esparza MD     CC: afib    Problem List:      Persistent Atrial Fibrillation   CHADSvasc = 5 started Eliquis 11/2022  Stress echocardiogram 9/12/2019: Estimated EF = 65%.Left ventricular systolic function is normal. Acceptable negative echocardiographic exercise stress test without anginal type chest discomfort, definitive ischemic EKG changes, or echocardiographic regional wall motion abnormality with preserved systolic left ventricular function following exercise to 85% predicted maximal heart rate and 88% predicted exercise capacity.Overall, low probability for significant focal obstructive coronary artery disease with preserved systolic left ventricular function  Diagnosed November 2022 in office  Event Monitor 11/10-11/24/2022: 100% AFIB with average HR 83 bpm  Echocardiogram 12/15/2022: EF 55%, no significant valve disease  ECV to NSR 1/16/2023  Event Monitor 1/16-2/14/2023: NO AFIB, SVT, bradycardia. 9% PAC, Less than 1% PVCs, average HR 61 bpm  HTN  HLP  ZAY - on CPAP  GERD  Remote left LE DVT  BPH  Crohn's disease with partial colectomy and colostomy -Dr. Lester,   Anxiety/depression  Surgical History:  Partial colectomy  Hand surgery  Lithotripsy     History of Present Illness:   Luis Gamboa is a 78 year old male with above PMHx who presents today for scheduled LAAO device implant. He was first diagnosed with afib 11/2022 at a doctors office visit. He was started on Eliquis however due to his crohn's disease and risk for bleeding, he is not a good long term candidate for anticoagulation. He denies recent hospitalization, bleeding, signs of CVA/TIA.       Allergies   Allergen Reactions    Imuran [Azathioprine] GI Intolerance      hemorrhagic  urticaria.  Bleeding        Prior to Admission Medications       Prescriptions Last Dose Informant Patient Reported? Taking?    amLODIPine (NORVASC) 5 MG tablet  Self No No    Take 1 tablet by mouth Daily.    apixaban (ELIQUIS) 5 MG tablet tablet  Self No No    Take 1 tablet by mouth 2 (Two) Times a Day.    Budesonide (ENTOCORT EC) 3 MG 24 hr capsule  Self Yes No    Take 2 capsules by mouth Take As Directed. Every 4th day    Calcium Carbonate (CALCIUM 600 PO)  Self Yes No    Take  by mouth Daily.    diphenoxylate-atropine (LOMOTIL) 2.5-0.025 MG per tablet  Self Yes No    Take 1 tablet by mouth As Needed for Diarrhea.    finasteride (PROSCAR) 5 MG tablet  Self Yes No    Take 1 tablet by mouth Daily.    immune globulin, human, 1 GM/5ML solution  Self Yes No    Inject  under the skin into the appropriate area as directed 1 (One) Time Per Week. Infusion    latanoprost (XALATAN) 0.005 % ophthalmic solution  Self Yes No    Administer 1 drop to both eyes Every Night.    magnesium oxide (MAGOX) 400 (241.3 MG) MG tablet tablet  Self Yes No    Take 500 mg by mouth Daily.    Multiple Vitamins-Minerals (MULTIVITAMIN ADULT PO)  Self Yes No    Take  by mouth Daily.    Omega-3 Fatty Acids (FISH OIL) 1000 MG capsule capsule  Self Yes No    Take  by mouth Daily With Breakfast.    omeprazole (priLOSEC) 20 MG capsule  Self Yes No    Take 1 capsule by mouth Daily.    ondansetron ODT (ZOFRAN-ODT) 4 MG disintegrating tablet  Self No No    Take 1 tablet by mouth Every 6 (Six) Hours As Needed for Nausea or Vomiting.    sertraline (ZOLOFT) 50 MG tablet  Self Yes No    Take 1 tablet by mouth Daily.    SIMVASTATIN PO  Self Yes No    Take 20 mg by mouth Daily.    tamsulosin (FLOMAX) 0.4 MG capsule 24 hr capsule  Self Yes No    Take 1 capsule by mouth Daily.    vitamin C (ASCORBIC ACID) 500 MG tablet   Yes No    Take 1 tablet by mouth Daily.    zolpidem (AMBIEN) 10 MG tablet  Self Yes No    Take 1 tablet by mouth At Night As Needed for Sleep.               Current Facility-Administered Medications:     acetaminophen (TYLENOL) tablet 650 mg, 650 mg, Oral, Q4H PRN, Hodges, Radha Iris, APRN    ceFAZolin 2000 mg IVPB in 100 mL NS (MBP), 2,000 mg, Intravenous, Once, Hodges, Radha Iris, APRN    nitroglycerin (NITROSTAT) SL tablet 0.4 mg, 0.4 mg, Sublingual, Q5 Min PRN, Hodges, Radha Iris, APRN    ondansetron (ZOFRAN) injection 4 mg, 4 mg, Intravenous, Q6H PRN, Hodges, Radha Iris, APRN    sodium chloride 0.9 % flush 1-10 mL, 1-10 mL, Intravenous, PRN, Hodges, Radha Iris, APRN    sodium chloride 0.9 % flush 10 mL, 10 mL, Intravenous, Q12H, Hodges, Radha Iris, APRN    sodium chloride 0.9 % infusion 40 mL, 40 mL, Intravenous, PRN, Hodges, Radha Iris, APRN    sodium chloride 0.9 % infusion, 1 mL/kg/hr (Order-Specific), Intravenous, Continuous, Hodges, Radha Iris, APRN, Last Rate: 91.5 mL/hr at 23 1118, 1 mL/kg/hr at 23 1118    Social History     Socioeconomic History    Marital status:    Tobacco Use    Smoking status: Former     Types: Cigarettes     Quit date:      Years since quittin.8    Smokeless tobacco: Never   Vaping Use    Vaping Use: Never used   Substance and Sexual Activity    Alcohol use: No    Drug use: No    Sexual activity: Defer       Family History   Problem Relation Age of Onset    No Known Problems Mother     No Known Problems Father        REVIEW OF SYSTEMS:   CONSTITUTIONAL:         No weight loss, fever, chills, weakness or fatigue.   HEENT:                            No visual loss, blurred vision, double vision, yellow sclerae.                                             No hearing loss, congestion, sore throat.   SKIN:                                No rashes, urticaria, ulcers, sores.     RESPIRATORY:               No shortness of breath, hemoptysis, cough, sputum.   GI:                                     No anorexia, nausea, vomiting, diarrhea. No abdominal pain, melena.   :    "                                No burning on urination, hematuria or increased frequency.  ENDOCRINE:                   No diaphoresis, cold or heat intolerance. No polyuria or polydipsia.   NEURO:                            No headache, dizziness, syncope, paralysis, ataxia, or parasthesias.                                            No change in bowel or bladder control. No history of CVA/TIA  MUSCULOSKELETAL:    No muscle, back pain, joint pain or stiffness.   HEMATOLOGY:               No anemia, bleeding, bruising. No history of DVT/PE.  PSYCH:                            No history of depression, anxiety    Vitals:    11/13/23 1040   BP: 124/96   BP Location: Right arm   Patient Position: Lying   Pulse: 83   Resp: 16   Temp: 96.6 °F (35.9 °C)   SpO2: 99%   Weight: 90.1 kg (198 lb 10.2 oz)   Height: 177.8 cm (70\")         Vital Sign Min/Max for last 24 hours  Temp  Min: 96.6 °F (35.9 °C)  Max: 96.6 °F (35.9 °C)   BP  Min: 124/96  Max: 124/96   Pulse  Min: 83  Max: 83   Resp  Min: 16  Max: 16   SpO2  Min: 99 %  Max: 99 %   No data recorded    No intake or output data in the 24 hours ending 11/13/23 1145          Physical Exam:  GEN: Well nourished, Well- developed  No acute distress  HEENT: Normocephalic, Atraumatic, PERRLA, moist mucous membranes  NECK: supple, NO JVD, no thyromegaly, no lymphadenopathy  CARDIAC: S1S2  IRR, IRR, no murmur, gallop, rub  LUNGS: Clear to ausculation, normal respiratory effort  ABDOMEN: Soft, nontender, normal bowel sounds  EXTREMITIES:No gross deformities,  No clubbing, cyanosis, or edema  SKIN: Warm, dry  NEURO: No focal deficits  PSYCHIATRIC: Normal affect and mood      I personally viewed and interpreted the patient's EKG/Telemetry/lab data    Data:   Results from last 7 days   Lab Units 11/08/23  1324   WBC 10*3/mm3 5.64   HEMOGLOBIN g/dL 15.7   HEMATOCRIT % 46.2   PLATELETS 10*3/mm3 165     Results from last 7 days   Lab Units 11/08/23  1324   SODIUM mmol/L 140   POTASSIUM " mmol/L 4.8   CHLORIDE mmol/L 102   CO2 mmol/L 29.0   BUN mg/dL 20   CREATININE mg/dL 1.12   GLUCOSE mg/dL 112*      Results from last 7 days   Lab Units 11/08/23  1324   HEMOGLOBIN A1C % 5.40             Results from last 7 days   Lab Units 11/08/23  1324   PROTIME Seconds 14.4   INR  1.10                 No intake or output data in the 24 hours ending 11/13/23 1145    Telemetry: afib, HR 83 bpm         Assessment and Plan:   1. Persistent Atrial Fibrillation:  - s/p ECV to NSR 1/2023. He is in afib today. Asymptomatic.   - CHADSVasc = 5 on Eliquis. In setting of his Crohn's disease and risk for GI bleeding, he is not a good candidate for long term anticoagulation. He is however a good candidate for short term anticoagulation. He is scheduled for Watchman device 11/13/23. The risks, benefits, and alternatives of the procedure have been reviewed and the patient wishes to proceed.   -Plan for LAAO device implant today. The risks, benefits, and alternatives of the procedure have been reviewed and the patient wishes to proceed.      2. ZAY:  - on CPAP      3. HTN:  - controlled.       Electronically signed by ROSALBA Gonzalez, 11/13/23, 11:46 AM EST.    I have read the above note and agree

## 2023-11-13 NOTE — NURSING NOTE
LAAO Procedure Information   Luis Gamboa 1945   [address] [id number] [phone] (home)       NGA Orifice Maximal Width: 20  mm  Cumulative Air Kerma:  32 mGy  Contrast Volume:  50 mL  Dose Area Product:  253.48 ?Gy-M2    Radha Starr RN

## 2023-11-13 NOTE — Clinical Note
Replaced previous sheath in the right femoral vein. 8 fr rfv sheath exchanged for 8.5 fr acqguide mini sheath over wire

## 2023-11-13 NOTE — Clinical Note
Replaced previous sheath in the right femoral vein. 8.5 fr rfv sheath exchanged for 15 fr watchman sheath over wire

## 2023-11-14 ENCOUNTER — CALL CENTER PROGRAMS (OUTPATIENT)
Dept: CALL CENTER | Facility: HOSPITAL | Age: 78
End: 2023-11-14
Payer: MEDICARE

## 2023-11-14 NOTE — OUTREACH NOTE
PCI/Device Survey      Flowsheet Row Responses   Facility patient discharged from? [facility]   Procedure date 11/13/23   Procedure (if device, specify in description) Device   Device Description Insertion of a left atrial appendage occlusive device (Watchman flex   Performing MD Dr. Tevin Esparza   Attempt successful? Yes   Call start time 1107   Call end time 1118   Nursing interventions Patient education provided   Is the patient taking prescribed medications: --  [Eliquis resumed today,pt reports. Pt states that Dr. Esparza told him to begin 81mg ASA daily while in [facility].ASA not written on DC meds/AVS, RN advised pt to call MD to verify 81mg ASA daily prior to taking med,pt has h/o Crohns.Pt states will call MD.]   Nursing intervention Reminded to continue to take prescribed medications, Nurse provided patient education   Does the patient have any of the following symptoms related to the cath/surgical site? --  [Right groin dressing in place, no issues per pt.]   Nursing intervention Patient education provided   Does the patient have an appointment scheduled with the cardiologist? Yes   If the patient is a current smoker, are they able to teach back resources for cessation? Not a smoker   Did the patient feel prepared to go home on the same day as the procedure? Yes   Is the patient satisfied with the same day discharge process? Yes   PCI/Device call completed Yes            Siobhan MYERS - Registered Nurse

## 2023-11-27 NOTE — PROGRESS NOTES
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      INTERNIST: Anand De Souza MD  VA PHYSICIAN: Kay Cannon MD  COLORECTAL SURGEON: Alfonso Lester MD  IMMUNOLOGIST:  Deysi Smith MD .    Chief Complaint:   Chief Complaint   Patient presents with    Atrial fibrillation, persistent     Problem List:  Probable hypertensive cardiovascular disease:  Remote progressive exertional dyspnea/chest pain syndrome with acceptable GXT and borderline abnormal acceptable Holter monitor, April 2009.  Recurrent progressive NYHA class III symptoms with abnormal EKG and acceptable chest x-ray with essentially normal right dominant coronary artery and preserved systolic LV dysfunction, LVEF (0.66) with continued medical therapy felt warranted, July 2009.   CCS class 0 chest discomfort/NYHA class II-III fatigue symptoms with abnormal monitor demonstrating bradycardia, August 2019.   Stress echocardiogram 9/12/2019: Estimated EF = 65%.Left ventricular systolic function is normal.There is no evidence of pericardial effusion.Acceptable negative echocardiographic exercise stress test without anginal type chest discomfort, definitive ischemic EKG changes, or echocardiographic regional wall motion abnormality with preserved systolic left ventricular function following exercise to 85% predicted maximal heart rate and 88% predicted exercise capacity.Overall, current study suggests low probability for significant focal obstructive coronary artery disease with preserved systolic left ventricular function  Echocardiogram 12/15/2022: LVEF 55%, LV wall thickness consistent with hypertrophy, cardiac valves are anatomically and functionally normal, RVSP less than 35 mmHg, atrial fibrillation noted throughout study  Residual class I symptoms, August 2020, March 2021, October 2021,  November 2022, May 2023, December 2023  Bradycardia with Holter monitor 8/20/2019: Average heart rate was 52 bpm, heart rate less than 50 bpm 68% the time but there were no pauses and only 9 PVCs and 2200 PACs which represent 3% total heartbeats with no atrial fibrillation  Moderate dyslipidemia.   Labile hypertension.  Mildly overweight, BMI 28.70.  Obstructive sleep apnea with intermittent CPAP therapy (did not tolerate).   Hiatal hernia/probable GERD, April 2009.  Remote left calf DVT/Coumadin therapy - data deficit.   Chronic lower tract obstructive symptoms/probable BPH.   Crohn’s disease with partial colectomy and colostomy - data deficit.   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   Chronic intermittent anxiety/remote depression.   Chronic insomnia.   Intermittent allergic rhinitis.   Abnormal CT abdomen/pelvis demonstrating a 3.2 x 4.2 cm mass in the leftward retroperitoneum with attenuation coefficient consistent with a solid, noncystic mass, September 2019  Paroxysmal atrial fibrillation  Diagnosed November 2022 in office, asymptomatic  CHADsVasc 3, started Eliquis  Holter monitor showed 100% atrial for burden with heart rate ranged between  bpm with average 83 bpm with occasional PVCs  Successful external cardioversion 1/16/2023  Mobile cardiac outpatient telemetry monitor March 2023 with no evidence of atrial fibrillation, SVT, pauses, heart block, or VT.  Heart rate ranged between  bpm with average 61 bpm, 9% PAC burden, less than 1% PVC burden  27 mm Watchman FLX device implant 11/13/2023, ARCHIE showed EF 51-55%, mild MR, device well-seated with no periprosthetic flow.  Upcoming 45-day ARCHIE  Remote operations:  Partial colectomy with colostomy, 2001.   Hand surgery secondary to trauma, 2004.   Lithotripsy September 2019    Allergies   Allergen Reactions    Imuran [Azathioprine] GI Intolerance      hemorrhagic urticaria.  Bleeding        Current Outpatient Medications    Medication Instructions    amLODIPine (NORVASC) 5 mg, Oral, Daily    apixaban (ELIQUIS) 5 mg, Oral, 2 Times Daily, First dose in AM tommorrow    aspirin 81 mg, Oral, Daily    Budesonide (ENTOCORT EC) 6 mg, Oral, Take As Directed, Every 4th day    diphenoxylate-atropine (LOMOTIL) 2.5-0.025 MG per tablet 1 tablet, Oral, As Needed    finasteride (PROSCAR) 5 mg, Oral, Daily    immune globulin, human, 1 GM/5ML solution Subcutaneous, Weekly, Infusion     latanoprost (XALATAN) 0.005 % ophthalmic solution 1 drop, Both Eyes, Nightly    magnesium oxide (MAGOX) 500 mg, Oral, Daily    Multiple Vitamins-Minerals (MULTIVITAMIN ADULT PO) 1 tablet, Oral, Daily    Omega-3 Fatty Acids (FISH OIL) 1000 MG capsule capsule 1 capsule, Oral, Daily With Breakfast    omeprazole (PRILOSEC) 20 mg, Oral, Daily    ondansetron ODT (ZOFRAN-ODT) 4 mg, Oral, Every 6 Hours PRN    sertraline (ZOLOFT) 50 mg, Oral, Daily    SIMVASTATIN PO 20 mg, Oral, Daily    tamsulosin (FLOMAX) 0.4 MG capsule 24 hr capsule 1 capsule, Oral, Daily    vitamin C (ASCORBIC ACID) 500 mg, Oral, Daily    zolpidem (AMBIEN) 10 mg, Oral, Nightly PRN         HISTORY OF PRESENT ILLNESS:  The patient is here for 6-month follow-up.  In the interim he had a 27 mm Watchman FLX device implant 11/13/2023, ARCHIE showed EF 51-55%, mild MR, device well-seated with no periprosthetic flow.  He has an upcoming 45-day ARCHIE.  The patient denies any chest pain, shortness of breath, palpitations, dizziness, presyncope, or syncope.  He stays very busy taking his wife to radiation treatments which are every day through 12/26/2023.  He helps out with household chores as well.  His blood pressures have been WNL.      ROS   All other systems reviewed and otherwise negative.    Procedures       Objective:       Vitals:    12/06/23 1311 12/06/23 1320   BP: 118/68 114/62   BP Location: Right arm Right arm   Patient Position: Sitting Standing   Cuff Size: Adult Adult   Pulse: 96    SpO2: 98% 98%   Weight:  "90.7 kg (200 lb)    Height: 177.8 cm (70\")      Body mass index is 28.7 kg/m².  Last weight May 2023 was 201 pounds  Constitutional:       Appearance: Healthy appearance. Not in distress.   Neck:      Vascular: No JVR. JVD normal.   Pulmonary:      Effort: Pulmonary effort is normal.      Breath sounds: Normal breath sounds. No wheezing. No rhonchi. No rales.   Chest:      Chest wall: Not tender to palpatation.   Cardiovascular:      PMI at left midclavicular line. Normal rate. Irregularly irregular rhythm. Normal S1. Normal S2.       Murmurs: There is a grade 1/6 systolic murmur at the URSB.      No gallop.  No click. No rub.   Pulses:     Intact distal pulses.   Edema:     Peripheral edema absent.   Abdominal:      General: Bowel sounds are normal.      Palpations: Abdomen is soft.      Tenderness: There is no abdominal tenderness.   Musculoskeletal: Normal range of motion.         General: No tenderness. Skin:     General: Skin is warm and dry.   Neurological:      General: No focal deficit present.      Mental Status: Alert and oriented to person, place and time.           Lab Review:   Lab Results   Component Value Date    GLUCOSE 112 (H) 11/08/2023    BUN 20 11/08/2023    CREATININE 1.12 11/08/2023    EGFRIFNONA 49 12/17/2021    EGFRIFAFRI 60 12/17/2021    BCR 17.9 11/08/2023    CO2 29.0 11/08/2023    CALCIUM 9.1 11/08/2023    ALBUMIN 4.20 09/24/2019    AST 26 09/24/2019    ALT 14 09/24/2019       Lab Results   Component Value Date    WBC 5.64 11/08/2023    HGB 15.7 11/08/2023    HCT 46.2 11/08/2023    .2 (H) 11/08/2023     11/08/2023       Lab Results   Component Value Date    HGBA1C 5.40 11/08/2023     Advance Care Planning   ACP discussion was held with the patient during this visit. Patient does not have an advance directive, declines further assistance.              Assessment:     Overall continued acceptable course with no new interim cardiopulmonary complaints with acceptable functional " status. We will defer additional diagnostic or therapeutic intervention from a cardiac perspective at this time.  The patient had a 27 mm Watchman FLX device implant 11/13/2023, ARCHIE showed EF 51-55%, mild MR, device well-seated with no periprosthetic flow. Patient has upcoming 45-day ARCHIE status post Watchman device implant.       Diagnosis Plan   1. Atrial fibrillation, persistent  27 mm Watchman FLX device implant 11/13/2023, ARCHIE showed EF 51-55%, mild MR, device well-seated with no periprosthetic flow.,  Continue Eliquis and aspirin until after upcoming 45-day ARCHIE      2. Hypertensive heart disease without heart failure  No recurrent angina pectoris or CHF on current activity schedule; continue current treatment       3. Dyslipidemia  No new labs to review, continue simvastatin      4. Obstructive sleep apnea on CPAP  Continue CPAP             Plan:         Patient to continue current medications and close follow up with the above providers.  Tentative cardiology follow up in March 2024 or patient may return sooner PRN.  Patient has upcoming 45-day ARCHIE status post Watchman device implant, Continue Eliquis and aspirin for now      Electronically signed by ROSALBA Luo, 12/06/23, 1:55 PM EST.

## 2023-12-06 ENCOUNTER — OFFICE VISIT (OUTPATIENT)
Dept: CARDIOLOGY | Facility: CLINIC | Age: 78
End: 2023-12-06
Payer: MEDICARE

## 2023-12-06 VITALS
SYSTOLIC BLOOD PRESSURE: 114 MMHG | BODY MASS INDEX: 28.63 KG/M2 | OXYGEN SATURATION: 98 % | HEART RATE: 96 BPM | DIASTOLIC BLOOD PRESSURE: 62 MMHG | HEIGHT: 70 IN | WEIGHT: 200 LBS

## 2023-12-06 DIAGNOSIS — I11.9 HYPERTENSIVE HEART DISEASE WITHOUT HEART FAILURE: ICD-10-CM

## 2023-12-06 DIAGNOSIS — E78.5 DYSLIPIDEMIA: ICD-10-CM

## 2023-12-06 DIAGNOSIS — I48.19 ATRIAL FIBRILLATION, PERSISTENT: Primary | ICD-10-CM

## 2023-12-06 DIAGNOSIS — G47.33 OBSTRUCTIVE SLEEP APNEA ON CPAP: ICD-10-CM

## 2023-12-06 PROCEDURE — 3078F DIAST BP <80 MM HG: CPT | Performed by: NURSE PRACTITIONER

## 2023-12-06 PROCEDURE — 1159F MED LIST DOCD IN RCRD: CPT | Performed by: NURSE PRACTITIONER

## 2023-12-06 PROCEDURE — 99214 OFFICE O/P EST MOD 30 MIN: CPT | Performed by: NURSE PRACTITIONER

## 2023-12-06 PROCEDURE — 1160F RVW MEDS BY RX/DR IN RCRD: CPT | Performed by: NURSE PRACTITIONER

## 2023-12-06 PROCEDURE — 3074F SYST BP LT 130 MM HG: CPT | Performed by: NURSE PRACTITIONER

## 2023-12-06 RX ORDER — ASPIRIN 81 MG/1
81 TABLET ORAL DAILY
COMMUNITY

## 2024-01-03 ENCOUNTER — HOSPITAL ENCOUNTER (OUTPATIENT)
Dept: CARDIOLOGY | Facility: HOSPITAL | Age: 79
Discharge: HOME | End: 2024-01-03
Admitting: INTERNAL MEDICINE
Payer: MEDICARE

## 2024-01-03 VITALS
DIASTOLIC BLOOD PRESSURE: 105 MMHG | RESPIRATION RATE: 14 BRPM | HEART RATE: 87 BPM | WEIGHT: 199.96 LBS | BODY MASS INDEX: 28.63 KG/M2 | OXYGEN SATURATION: 93 % | HEIGHT: 70 IN | SYSTOLIC BLOOD PRESSURE: 132 MMHG

## 2024-01-03 DIAGNOSIS — I48.0 PAROXYSMAL ATRIAL FIBRILLATION: ICD-10-CM

## 2024-01-03 PROCEDURE — 93321 DOPPLER ECHO F-UP/LMTD STD: CPT

## 2024-01-03 PROCEDURE — 25010000002 MIDAZOLAM PER 1 MG: Performed by: INTERNAL MEDICINE

## 2024-01-03 PROCEDURE — 76376 3D RENDER W/INTRP POSTPROCES: CPT

## 2024-01-03 PROCEDURE — 93312 ECHO TRANSESOPHAGEAL: CPT

## 2024-01-03 PROCEDURE — 25010000002 FENTANYL CITRATE (PF) 50 MCG/ML SOLUTION: Performed by: INTERNAL MEDICINE

## 2024-01-03 PROCEDURE — 93325 DOPPLER ECHO COLOR FLOW MAPG: CPT

## 2024-01-03 RX ORDER — PANTOPRAZOLE SODIUM 40 MG/1
40 TABLET, DELAYED RELEASE ORAL DAILY
Qty: 30 TABLET | Refills: 11 | Status: SHIPPED | OUTPATIENT
Start: 2024-01-03 | End: 2024-01-03 | Stop reason: SDUPTHER

## 2024-01-03 RX ORDER — MIDAZOLAM HYDROCHLORIDE 1 MG/ML
INJECTION INTRAMUSCULAR; INTRAVENOUS
Status: COMPLETED | OUTPATIENT
Start: 2024-01-03 | End: 2024-01-03

## 2024-01-03 RX ORDER — PANTOPRAZOLE SODIUM 40 MG/1
40 TABLET, DELAYED RELEASE ORAL DAILY
Qty: 30 TABLET | Refills: 11 | Status: SHIPPED | OUTPATIENT
Start: 2024-01-03

## 2024-01-03 RX ORDER — CLOPIDOGREL BISULFATE 75 MG/1
75 TABLET ORAL DAILY
Qty: 30 TABLET | Refills: 6 | Status: SHIPPED | OUTPATIENT
Start: 2024-01-03

## 2024-01-03 RX ORDER — CLOPIDOGREL BISULFATE 75 MG/1
75 TABLET ORAL DAILY
Qty: 30 TABLET | Refills: 6 | Status: SHIPPED | OUTPATIENT
Start: 2024-01-03 | End: 2024-01-03 | Stop reason: SDUPTHER

## 2024-01-03 RX ORDER — FENTANYL CITRATE 50 UG/ML
INJECTION, SOLUTION INTRAMUSCULAR; INTRAVENOUS
Status: COMPLETED | OUTPATIENT
Start: 2024-01-03 | End: 2024-01-03

## 2024-01-03 RX ADMIN — MIDAZOLAM 2 MG: 1 INJECTION INTRAMUSCULAR; INTRAVENOUS at 14:17

## 2024-01-03 RX ADMIN — FENTANYL CITRATE 50 MCG: 50 INJECTION, SOLUTION INTRAMUSCULAR; INTRAVENOUS at 14:21

## 2024-01-03 RX ADMIN — METHOHEXITAL SODIUM 20 MG: 500 INJECTION, POWDER, LYOPHILIZED, FOR SOLUTION INTRAMUSCULAR; INTRAVENOUS; RECTAL at 14:18

## 2024-01-03 NOTE — H&P
[facility]  HISTORY AND PHYSICAL    Luis Gamboa  : 1945  MRN:[id number]    Date of Admission:1/3/2024    PCP: Anand De Souza MD    IDENTIFICATION: A 78 y.o. male that lives in [place], [place].    Chief complaint: Postop ARCHIE for left atrial appendage closure device watchman    Problem List:      Persistent Atrial Fibrillation   CHADSvasc = 5 started Eliquis 2022  Stress echocardiogram 2019: Estimated EF = 65%.Left ventricular systolic function is normal. Acceptable negative echocardiographic exercise stress test without anginal type chest discomfort, definitive ischemic EKG changes, or echocardiographic regional wall motion abnormality with preserved systolic left ventricular function following exercise to 85% predicted maximal heart rate and 88% predicted exercise capacity.Overall, low probability for significant focal obstructive coronary artery disease with preserved systolic left ventricular function  Diagnosed 2022 in office  Event Monitor 11/: 100% AFIB with average HR 83 bpm  Echocardiogram 12/15/2022: EF 55%, no significant valve disease  ECV to NSR 2023  Event Monitor -2023: NO AFIB, SVT, bradycardia. 9% PAC, Less than 1% PVCs, average HR 61 bpm  HTN  HLP  ZAY - on CPAP  GERD  Remote left LE DVT  BPH  Crohn's disease with partial colectomy and colostomy -Dr. Lester,   Anxiety/depression  Surgical History:  Partial colectomy  Hand surgery  Lithotripsy     ALLERGIES:   Allergies   Allergen Reactions    Imuran [Azathioprine] GI Intolerance      hemorrhagic urticaria.  Bleeding        HOME MEDICINES:    Cannot display prior to admission medications because the patient has not been admitted in this contact.            Subjective     HPI: Luis Gamboa is a 78 y.o. male with a past medical history significant for atrial fibrillation, hypertension, hyperlipidemia, ZAY, GERD, DVT, BPH, and Crohn's disease  presents to [facility] today for ARCHIE.  On 11/13/2023 patient had a left atrial appendage closure device/watchman placed by Dr. Esparza due to high risk of bleeding with his Crohn's disease.  She is currently on aspirin and Eliquis and denies any bleeding complications.  He denies chest pain and shortness of breath.       ROS: All systems have been reviewed and are negative with the exception of those mentioned in the HPI and problem list above.    Past Medical History:   Past Medical History:   Diagnosis Date    Allergic rhinitis 11/03/2016    Intermittent    Atrial fibrillation     Chronic anxiety 11/03/2016    Chronic intermittent anxiety/remote depression.     Chronic insomnia 11/03/2016    Colostomy in place     Crohn's disease 11/03/2016    Crohn’s disease with partial colectomy and colostomy - data deficit.     DVT (deep venous thrombosis)     Remote left calf DVT/Coumadin therapy - data deficit.    Dyslipidemia 11/03/2016    Moderate    Enlarged prostate     Glaucoma     Hiatal hernia 11/03/2016    probable GERD, April 2009.    Hyperlipidemia     Hypertensive cardiovascular disease 11/03/2016    Probable hypertensive cardiovascular disease: Remote progressive exertional dyspnea/chest pain syndrome with acceptable GXT and borderline abnormal acceptable Holter monitor, April 2009. Recurrent progressive NYHA class III symptoms with abnormal EKG and acceptable chest x-ray with essentially normal right dominant coronary artery and preserved systolic LV dysfunction, LVEF (0.66) with continued m    Labile hypertension 11/03/2016    Obstructive sleep apnea on CPAP 11/03/2016    Obstructive sleep apnea with intermittent CPAP therapy (did not tolerate).  --PATIENT STATES THE DOES NOT USE MACHINE ALL THE TIME    Vitamin deficiency     Chronic vitamin deficiency secondary to Crohn’s disease.        Surgical History:   Past Surgical History:   Procedure Laterality Date    ATRIAL APPENDAGE EXCLUSION LEFT  WITH TRANSESOPHAGEAL ECHOCARDIOGRAM N/A 2023    Procedure: Atrial Appendage Occlusion;  Surgeon: Tevin Esparza MD;  Location: [facility] INVASIVE LOCATION;  Service: Cardiology;  Laterality: N/A;    CARDIOVERSION      2023 PER DR. VICKERS    COLECTOMY PARTIAL / TOTAL      with colostomy    HAND SURGERY      secondary to trauma     Social History:   Social History     Socioeconomic History    Marital status:    Tobacco Use    Smoking status: Former     Types: Cigarettes     Quit date:      Years since quittin.0    Smokeless tobacco: Never   Vaping Use    Vaping Use: Never used   Substance and Sexual Activity    Alcohol use: No    Drug use: No    Sexual activity: Defer     Family History:   Family History   Problem Relation Age of Onset    No Known Problems Mother     No Known Problems Father        Objective   /97   Pulse 76   Resp 16   SpO2 98%   No intake or output data in the 24 hours ending 24 1329    PHYSICAL EXAM:  CONSTITUTIONAL: Well nourished, cooperative, in no acute distress  HEENT: Normocephalic, atraumatic, PERRLA, no JVD  CARDIOVASCULAR: Irregular irregular, no murmur, gallop, rub.   RESPIRATORY: Clear to auscultation, normal respiratory effort, no wheezing, rales or ronchi, room air  GI: Soft, nontender, normal bowel sounds  EXTREMITIES: No gross deformities, no edema. Peripheral pulses are present and equal bilaterally  SKIN: Warm, dry. No bleeding, bruising or rash  NEUROLOGICAL: No focal deficits  PSYCHIATRIC: Normal mood and affect. Behavior is normal     EKG/Telemetry: atrial fibrillation    Radiology Data:   No radiology results for the last day   Results for orders placed during the hospital encounter of 23    Intra-Operative Transesophageal Echocardiogram    Interpretation Summary    27 mm Watchman FLX atrial appendage occlusion device placed under ARCHIE guidance.  the device is well-seated with no periprosthetic flow.    Left ventricular  systolic function is normal. Left ventricular ejection fraction appears to be 51 - 55%.    Mild mitral valve regurgitation is present.       Current Medications:    No current facility-administered medications for this encounter.      Assessment:     Persistent atrial fibrillation s/p Watchman implant 11/13/2023 Dr. Esparza  - [id number], anticoagulated with Eliquis and aspirin    Plan:     Patient is here today for ARCHIE 45 days postop left atrial appendage closure/Watchman device.  Procedure, risks, and benefits have been discussed with the patient he is agreeable to proceed.  Further recommendations to follow.    Electronically signed by ROSALBA Romero, 01/03/24, 1:27 PM EST.     Please note that portions of this note were dictated utilizing Dragon dictation.       Patient tolerated ARCHIE well.  Watchman device is well-seated with minimal periprosthetic flow.  Less than 5 mm.  He can discontinue his Eliquis today and we will start Plavix.  Due to Plavix interaction I will substitute Protonix 40 mg daily for his omeprazole.  Keep scheduled clinic follow-up

## 2024-01-04 RX ORDER — ROSUVASTATIN CALCIUM 20 MG/1
20 TABLET, COATED ORAL NIGHTLY
Qty: 30 TABLET | Refills: 11 | Status: SHIPPED | OUTPATIENT
Start: 2024-01-04

## 2024-01-04 RX ORDER — ROSUVASTATIN CALCIUM 20 MG/1
20 TABLET, COATED ORAL NIGHTLY
Qty: 30 TABLET | Refills: 11 | Status: SHIPPED | OUTPATIENT
Start: 2024-01-04 | End: 2024-01-04 | Stop reason: SDUPTHER

## 2024-01-17 ENCOUNTER — TELEPHONE (OUTPATIENT)
Dept: CARDIOLOGY | Facility: CLINIC | Age: 79
End: 2024-01-17
Payer: MEDICARE

## 2024-01-17 NOTE — TELEPHONE ENCOUNTER
"Spoke with patient's wife regarding concerns about Plavix. Patient was started on medication after Watchman procedure and has had a persistent nosebleed over the last day. Patient has packed his nose, and is now coughing out clots of blood. His BP an hour ago was 150/90 and is dizzy.     Patient's wife advised to take patient to the nearest emergency room to be evaluated for any active bleeding and we will reevaluate at time of discharge. Patient's wife states \" I'm just going to stop giving him the Plavix, that's what's causing his issues.\" She was informed on the importance of continuing antiplatelets even after having the Watchman and not to stop taking them without talking to the patient's provider. Patient's wife verbalizes understanding and states they will go to the ED and call the office upon discharge.  "

## 2024-02-09 ENCOUNTER — OFFICE VISIT (OUTPATIENT)
Dept: CARDIOLOGY | Facility: CLINIC | Age: 79
End: 2024-02-09
Payer: MEDICARE

## 2024-02-09 VITALS
DIASTOLIC BLOOD PRESSURE: 82 MMHG | OXYGEN SATURATION: 98 % | SYSTOLIC BLOOD PRESSURE: 130 MMHG | BODY MASS INDEX: 28.63 KG/M2 | WEIGHT: 200 LBS | HEART RATE: 77 BPM | HEIGHT: 70 IN

## 2024-02-09 DIAGNOSIS — G47.33 OBSTRUCTIVE SLEEP APNEA ON CPAP: ICD-10-CM

## 2024-02-09 DIAGNOSIS — I48.0 PAROXYSMAL ATRIAL FIBRILLATION: Primary | ICD-10-CM

## 2024-02-09 DIAGNOSIS — I11.0 HYPERTENSIVE HEART DISEASE WITH CONGESTIVE HEART FAILURE, UNSPECIFIED HEART FAILURE TYPE: ICD-10-CM

## 2024-02-09 DIAGNOSIS — R09.89 LABILE HYPERTENSION: ICD-10-CM

## 2024-02-09 DIAGNOSIS — R07.89 CHEST PAIN, ATYPICAL: ICD-10-CM

## 2024-02-09 RX ORDER — NITROGLYCERIN 0.4 MG/1
TABLET SUBLINGUAL
Qty: 100 TABLET | Refills: 11 | Status: SHIPPED | OUTPATIENT
Start: 2024-02-09

## 2024-02-09 NOTE — PROGRESS NOTES
Electrophysiology Clinic     Luis Gamboa  1945      02/09/24    DATE OF ADMISSION: (Not on file)  [facility] CARDIOLOGY    Anand De Souza MD  [address] / [place] [address]      Chief Complaint   Patient presents with    Atrial fibrillation, persistent    Chest Pain    Shortness of Breath    Dizziness       Problem list:   Persistent Atrial Fibrillation   CHADSvasc = 3 started Eliquis 11/2022  Stress echocardiogram 9/12/2019: Estimated EF = 65%.Left ventricular systolic function is normal. Acceptable negative echocardiographic exercise stress test without anginal type chest discomfort, definitive ischemic EKG changes, or echocardiographic regional wall motion abnormality with preserved systolic left ventricular function following exercise to 85% predicted maximal heart rate and 88% predicted exercise capacity.Overall, low probability for significant focal obstructive coronary artery disease with preserved systolic left ventricular function  Diagnosed November 2022 in office  Event Monitor 11/10-11/24/2022: 100% AFIB with average HR 83 bpm  Echocardiogram 12/15/2022: EF 55%, no significant valve disease  Watchman device placement November 13, 2023  ARCHIE January 3, 2024 EF 55% mild to moderate MR number 27 mm watchman flex atrial appendage occlusion device in place.  Small jet of periprosthetic flow measuring 2 to 3 mm.  However consistent with adequate left atrial appendage occlusion.  Atypical chest pain  Remote negative stress test 2019  Myalgias  HTN  HLP  ZAY - on CPAP  GERD  Remote left LE DVT  BPH  Crohn's disease with partial colectomy and colostomy -Dr. Lester,   Anxiety/depression  Surgical History:  Partial colectomy  Hand surgery  Lithotripsy         History of Present Illness:   Luis Gamboa is a 77 year old male presents for follow up of Afib, s/p Watchman device.  Patient reports since his last visit with ROSALBA Thornton and his watchman follow-up ARCHIE he has been  "feeling \"terrible\".  He extremely weak fatigue he has no strength in his legs.  He also has been waking up every morning with severe chest pain.  This last several minutes resolved on its own.  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  He is also very fatigued and short of breath.  He states he wonders if some of this on the medications has been changed such as he is changed from Zocor to Crestor in addition he has changed to a different proton pump inhibitor due to the interaction with Plavix.  He has not had palpitations dizziness near syncope or syncope    Allergies   Allergen Reactions    Imuran [Azathioprine] GI Intolerance      hemorrhagic urticaria.  Bleeding         Cannot display prior to admission medications because the patient has not been admitted in this contact.              Current Outpatient Medications:     amLODIPine (NORVASC) 5 MG tablet, Take 1 tablet by mouth Daily., Disp: 90 tablet, Rfl: 3    aspirin 81 MG EC tablet, Take 1 tablet by mouth Daily., Disp: , Rfl:     Budesonide (ENTOCORT EC) 3 MG 24 hr capsule, Take 2 capsules by mouth Take As Directed. Every 4th day, Disp: , Rfl:     clopidogrel (PLAVIX) 75 MG tablet, Take 1 tablet by mouth Daily., Disp: 30 tablet, Rfl: 6    diphenoxylate-atropine (LOMOTIL) 2.5-0.025 MG per tablet, Take 1 tablet by mouth As Needed for Diarrhea., Disp: , Rfl:     finasteride (PROSCAR) 5 MG tablet, Take 1 tablet by mouth Daily., Disp: , Rfl:     immune globulin, human, 1 GM/5ML solution, Inject  under the skin into the appropriate area as directed 1 (One) Time Per Week. Infusion, Disp: , Rfl:     latanoprost (XALATAN) 0.005 % ophthalmic solution, Administer 1 drop to both eyes Every Night., Disp: , Rfl:     magnesium oxide (MAGOX) 400 (241.3 MG) MG tablet tablet, Take 500 mg by mouth Daily., Disp: , Rfl:     Multiple Vitamins-Minerals (MULTIVITAMIN ADULT PO), Take 1 tablet by mouth Daily., " "Disp: , Rfl:     Omega-3 Fatty Acids (FISH OIL) 1000 MG capsule capsule, Take 1 capsule by mouth Daily With Breakfast., Disp: , Rfl:     ondansetron ODT (ZOFRAN-ODT) 4 MG disintegrating tablet, Take 1 tablet by mouth Every 6 (Six) Hours As Needed for Nausea or Vomiting., Disp: 15 tablet, Rfl: 0    pantoprazole (Protonix) 40 MG EC tablet, Take 1 tablet by mouth Daily., Disp: 30 tablet, Rfl: 11    sertraline (ZOLOFT) 50 MG tablet, Take 1 tablet by mouth Daily., Disp: , Rfl:     tamsulosin (FLOMAX) 0.4 MG capsule 24 hr capsule, Take 1 capsule by mouth Daily., Disp: , Rfl:     vitamin C (ASCORBIC ACID) 500 MG tablet, Take 1 tablet by mouth Daily., Disp: , Rfl:     zolpidem (AMBIEN) 10 MG tablet, Take 1 tablet by mouth At Night As Needed for Sleep., Disp: , Rfl:     Social History     Socioeconomic History    Marital status:    Tobacco Use    Smoking status: Former     Types: Cigarettes     Quit date:      Years since quittin.1    Smokeless tobacco: Never   Vaping Use    Vaping Use: Never used   Substance and Sexual Activity    Alcohol use: No    Drug use: No    Sexual activity: Defer       Family History   Problem Relation Age of Onset    No Known Problems Mother     No Known Problems Father        Vitals:    24 1001   BP: 130/82   BP Location: Left arm   Patient Position: Sitting   Cuff Size: Adult   Pulse: 77   SpO2: 98%   Weight: 90.7 kg (200 lb)   Height: 177.8 cm (70\")                   Physical Exam:  GEN: Well nourished, well-developed, no acute distress  HEENT: Normocephalic, atraumatic, PERRLA, moist mucous membranes  NECK: Supple, NO JVD, no thyromegaly, no lymphadenopathy  CARD: S1S2, irr, irr , no murmur, gallop, rub, PMI NL   LUNGS: Clear to auscultation, normal respiratory effort  ABDOMEN: Soft, nontender, normal bowel sounds  EXTREMITIES: No gross deformities, no clubbing, cyanosis, or edema  SKIN: Warm, dry  NEURO: No focal deficits, alert and oriented x 3  PSYCHIATRIC: Normal " "affect and mood      I personally viewed and interpreted the patient's EKG/Telemetry/lab data    Lab Results   Component Value Date    GLUCOSE 112 (H) 11/08/2023    CALCIUM 9.1 11/08/2023     11/08/2023    K 4.8 11/08/2023    CO2 29.0 11/08/2023     11/08/2023    BUN 20 11/08/2023    CREATININE 1.12 11/08/2023    EGFRIFAFRI 60 12/17/2021    EGFRIFNONA 49 12/17/2021    BCR 17.9 11/08/2023    ANIONGAP 9.0 11/08/2023     Lab Results   Component Value Date    WBC 5.64 11/08/2023    HGB 15.7 11/08/2023    HCT 46.2 11/08/2023    .2 (H) 11/08/2023     11/08/2023     Lab Results   Component Value Date    INR 1.10 11/08/2023    PROTIME 14.4 11/08/2023     No results found for: \"TSH\", \"S9SCXCG\", \"M4RUZCZ\", \"THYROIDAB\"          ECG 12 Lead    Date/Time: 2/9/2024 1:19 PM  Performed by: Connor Kelley PA    Authorized by: Connor Kelley PA  Comparison: compared with previous ECG from 4/26/2023  Similar to previous ECG  Rhythm: atrial fibrillation  Rate: normal    Clinical impression: non-specific ECG            ICD-10-CM ICD-9-CM   1. Paroxysmal atrial fibrillation  I48.0 427.31   2. Hypertensive heart disease with congestive heart failure, unspecified heart failure type  I11.0 402.91     428.0   3. Labile hypertension  R09.89 401.9         Assessment and Plan:   1. Persistent Atrial Fibrillation:  - diagnosed incidentally at office visit 11/10/2022, unknown duration, but likely occurring after a viral illness in October 2022  - ECV 1/16/2023- Back in Afib since minimally symptomatic.    2. CHADSVASc = 3: s/p Watchman device 11/13/2023.  ARCHIE, small 2=3mm periprosthetic flow, however consistent with adequate left atrial appendage occlusion.  Normal EF, 55%. Mild MR Moderate MR> DAPT.       3. ZAY:  - on CPAP     4. Myalgias: Recent change from Zocor to Crestor, will hold Crestor for 1 week to see if his symptoms improve    5.  Chest pain, atypical in nature previous negative stress test 2019.  " Repeat Lexiscan cardiac stress test rule out ischemia.  He will given sublingual nitroglycerin.  If chest pain  increases in nature would present to ER.    Electronically signed by PAULO Lorenzo, 02/09/24, 1:19 PM EST.

## 2024-02-23 NOTE — PROGRESS NOTES
Subjective:     Encounter Date:03/06/2024      Patient ID: Luis Gamboa is a 78 y.o.   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      INTERNIST: Anand De Souza MD  [facility] PHYSICIAN: Kay Cannon MD  COLORECTAL SURGEON: Alfonso Lester MD  IMMUNOLOGIST:  Deysi Smith MD .    Chief Complaint:   Chief Complaint   Patient presents with    Atrial fibrillation, persistent     Problem List:  Probable hypertensive cardiovascular disease:  Remote progressive exertional dyspnea/chest pain syndrome with acceptable GXT and borderline abnormal acceptable Holter monitor, April 2009.  Recurrent progressive NYHA class III symptoms with abnormal EKG and acceptable chest x-ray with essentially normal right dominant coronary artery and preserved systolic LV dysfunction, LVEF (0.66) with continued medical therapy felt warranted, July 2009.   CCS class 0 chest discomfort/NYHA class II-III fatigue symptoms with abnormal monitor demonstrating bradycardia, August 2019.   Stress echocardiogram 9/12/2019: Estimated EF = 65%.Left ventricular systolic function is normal.There is no evidence of pericardial effusion.Acceptable negative echocardiographic exercise stress test without anginal type chest discomfort, definitive ischemic EKG changes, or echocardiographic regional wall motion abnormality with preserved systolic left ventricular function following exercise to 85% predicted maximal heart rate and 88% predicted exercise capacity.Overall, current study suggests low probability for significant focal obstructive coronary artery disease with preserved systolic left ventricular function  Echocardiogram 12/15/2022: LVEF 55%, LV wall thickness consistent with hypertrophy, cardiac valves are anatomically and functionally normal, RVSP less than 35 mmHg, atrial fibrillation noted throughout study  Residual class I symptoms, August 2020, March 2021, October 2021,  November 2022, May 2023, December 2023, March 2024  Bradycardia with Holter monitor 8/20/2019: Average heart rate was 52 bpm, heart rate less than 50 bpm 68% the time but there were no pauses and only 9 PVCs and 2200 PACs which represent 3% total heartbeats with no atrial fibrillation  Moderate dyslipidemia; intolerant to statins.   Labile hypertension.  Mildly overweight, BMI 28.70.  Obstructive sleep apnea with intermittent CPAP therapy (did not tolerate).   Hiatal hernia/probable GERD, April 2009.  Remote left calf DVT/Coumadin therapy - data deficit.   Chronic lower tract obstructive symptoms/probable BPH.   Crohn’s disease with partial colectomy and colostomy - data deficit.   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   Chronic intermittent anxiety/remote depression.   Chronic insomnia.   Intermittent allergic rhinitis.   Abnormal CT abdomen/pelvis demonstrating a 3.2 x 4.2 cm mass in the leftward retroperitoneum with attenuation coefficient consistent with a solid, noncystic mass, September 2019  Paroxysmal atrial fibrillation  Diagnosed November 2022 in office, asymptomatic  CHADsVasc 3, started Eliquis  Holter monitor showed 100% atrial fib burden with heart rate ranged between  bpm with average 83 bpm with occasional PVCs  Successful external cardioversion 1/16/2023  Mobile cardiac outpatient telemetry monitor March 2023 with no evidence of atrial fibrillation, SVT, pauses, heart block, or VT.  Heart rate ranged between  bpm with average 61 bpm, 9% PAC burden, less than 1% PVC burden  27 mm Watchman FLX device implant 11/13/2023, ARCHIE showed EF 51-55%, mild MR, device well-seated with no periprosthetic flow.  ACRHIE 1/3/2024: 27 mm Watchman FLX atrial appendage occlusion device in place.  Small jet of periprosthetic flow measuring approximately 2-3 mm.  This is consistent with adequate NGA occlusion.  LVEF 51-55%, mild to moderate MR, severe grade 4 plaque in the aortic arch  present  Remote operations:  Partial colectomy with colostomy, 2001.   Hand surgery secondary to trauma, 2004.   Lithotripsy September 2019    Allergies   Allergen Reactions    Crestor [Rosuvastatin] Myalgia    Imuran [Azathioprine] GI Intolerance      hemorrhagic urticaria.  Bleeding        Current Outpatient Medications   Medication Instructions    aspirin 81 mg, Oral, Daily    Budesonide (ENTOCORT EC) 6 mg, Oral, Take As Directed, Every 4th day    clopidogrel (PLAVIX) 75 mg, Oral, Daily    diphenoxylate-atropine (LOMOTIL) 2.5-0.025 MG per tablet 1 tablet, Oral, As Needed    finasteride (PROSCAR) 5 mg, Oral, Daily    immune globulin, human, 1 GM/5ML solution Subcutaneous, Weekly, Infusion     latanoprost (XALATAN) 0.005 % ophthalmic solution 1 drop, Both Eyes, Nightly    magnesium oxide (MAGOX) 500 mg, Oral, Daily    Multiple Vitamins-Minerals (MULTIVITAMIN ADULT PO) 1 tablet, Oral, Daily    Amlodipine 5 mg daily 5 mg daily    nitroglycerin (NITROSTAT) 0.4 MG SL tablet 1 under the tongue as needed for angina, may repeat q5mins for up three doses    Omega-3 Fatty Acids (FISH OIL) 1000 MG capsule capsule 1 capsule, Oral, Daily With Breakfast    ondansetron ODT (ZOFRAN-ODT) 4 mg, Oral, Every 6 Hours PRN    pantoprazole (PROTONIX) 40 mg, Oral, Daily    sertraline (ZOLOFT) 50 mg, Oral, Daily    tamsulosin (FLOMAX) 0.4 MG capsule 24 hr capsule 1 capsule, Oral, Daily    vitamin C (ASCORBIC ACID) 500 mg, Oral, Daily    zolpidem (AMBIEN) 10 mg, Oral, Nightly PRN         HISTORY OF PRESENT ILLNESS:  The patient is here for 6-month follow-up. He had a 27 mm Watchman FLX device implant 11/13/2023, ARCHIE showed EF 51-55%, mild MR, device well-seated with no periprosthetic flow.  The patient's 45-day ARCHIE postprocedure was acceptable with adequate NGA occlusion but showed plaque in the aortic arch.  After his ARCHIE his simvastatin was switched to rosuvastatin and patient had severe myalgias.  After the ARCHIE, the patient stopped  "Eliquis and started Plavix and had epistaxis.  He has been having atypical chest pain when he goes to get up in the morning and had a stress test (that was performed this morning).  The patient wondered if the myalgias were due to recent change from Zocor to Crestor and his Crestor was held and symptoms improved.  The patient says that he no longer has myalgias in his hands.  In the interim the patient also had gout in his right foot and recently was on prednisone.  He did not feel like the prednisone necessarily helped as much with the hip pain.  Patient says that he has been having some severe left hip pain.  He has history of sciatica.  He has seen a chiropractor that told him that his hip pain was related to this. The patient is following up with his PCP on Friday.  His blood pressure is more elevated when he is in pain.  The patient has tried ice which makes it worse.  He has also tried heating pad and massaging near his leg/hip which helps.  He has tried lidocaine patches.  The patient remotely was on gabapentin.  He denies any shortness of breath, palpitations, dizziness, presyncope, or syncope.    ROS   All other systems reviewed and otherwise negative.    Procedures       Objective:       Vitals:    03/06/24 1423 03/06/24 1526   BP: 165/95 150/70   BP Location: Right arm Right arm   Patient Position: Sitting Sitting   Pulse: 71    SpO2: 99%    Weight: 90.7 kg (200 lb)    Height: 177.8 cm (70\")      Body mass index is 28.7 kg/m².  Last weight December 2023 was 200 pounds  Constitutional:       Appearance: Healthy appearance.      Comments: In wheelchair, appears in pain   Neck:      Vascular: No JVR. JVD normal.   Pulmonary:      Effort: Pulmonary effort is normal.      Breath sounds: Normal breath sounds. No wheezing. No rhonchi. No rales.   Chest:      Chest wall: Not tender to palpatation.   Cardiovascular:      PMI at left midclavicular line. Normal rate. Irregularly irregular rhythm. Normal S1. Normal " S2.       Murmurs: There is a grade 1/6 systolic murmur at the URSB.      No gallop.  No click. No rub.   Pulses:     Intact distal pulses.   Edema:     Peripheral edema absent.   Abdominal:      General: Bowel sounds are normal.      Palpations: Abdomen is soft.      Tenderness: There is no abdominal tenderness.   Musculoskeletal: Normal range of motion.         General: No tenderness. Skin:     General: Skin is warm and dry.   Neurological:      General: No focal deficit present.      Mental Status: Alert and oriented to person, place and time.           Lab Review:   Lab Results   Component Value Date    GLUCOSE 112 (H) 11/08/2023    BUN 20 11/08/2023    CREATININE 1.12 11/08/2023    EGFRIFNONA 49 12/17/2021    EGFRIFAFRI 60 12/17/2021    BCR 17.9 11/08/2023    CO2 29.0 11/08/2023    CALCIUM 9.1 11/08/2023    ALBUMIN 4.20 09/24/2019    AST 26 09/24/2019    ALT 14 09/24/2019       Lab Results   Component Value Date    WBC 5.64 11/08/2023    HGB 15.7 11/08/2023    HCT 46.2 11/08/2023    .2 (H) 11/08/2023     11/08/2023       Lab Results   Component Value Date    HGBA1C 5.40 11/08/2023     Advance Care Planning   ACP discussion was held with the patient during this visit. Patient does not have an advance directive, declines further assistance.              Assessment:      The patient had a 27 mm Watchman FLX device implant 11/13/2023, ARCHIE showed EF 51-55%, mild MR, device well-seated with no periprosthetic flow.  The patient's 45-day ARCHIE postprocedure was acceptable with adequate NGA occlusion.  Will review stress test results when available.  He is intolerant to rosuvastatin due to severe myalgias.  I will switch him to Repatha or Praluent, depending on insurance coverage.  I will switch his amlodipine to nebivolol 5 mg daily.  He will continue monitoring blood pressure and heart rate at home.       Diagnosis Plan   1. Atrial fibrillation, persistent  Asymptomatic, status post Watchman device  implant, continue aspirin, nebivolol 5 mg daily      2. Hypertensive heart disease without heart failure  Will review stress test results when available        3. Dyslipidemia  No new labs to review,  He is intolerant to rosuvastatin due to severe myalgias.  I will switch him to Repatha or Praluent, depending on insurance coverage.   4.  Atypical chest pain: Will review stress test results when available          Plan:         Patient to continue current medications and close follow up with the above providers.  Tentative cardiology follow up in August 2024 or patient may return sooner PRN.  Switch rosuvastatin to Repatha or Praluent, depending on insurance coverage  Encouraged patient to follow-up with PCP in 2 days for his hip pain  Will review stress test results when available.  Discontinue amlodipine  Start nebivolol 5 mg daily and continue monitoring blood pressure and heart rate at home  +/- referral to rheumatology to rule out polymyalgia rheumatica      Electronically signed by ROSALBA Luo, 03/06/24, 3:40 PM EST.

## 2024-02-26 ENCOUNTER — OFFICE VISIT (OUTPATIENT)
Dept: CARDIOLOGY | Facility: CLINIC | Age: 79
End: 2024-02-26
Payer: MEDICARE

## 2024-02-26 VITALS
SYSTOLIC BLOOD PRESSURE: 162 MMHG | DIASTOLIC BLOOD PRESSURE: 98 MMHG | BODY MASS INDEX: 28.49 KG/M2 | HEART RATE: 92 BPM | OXYGEN SATURATION: 91 % | WEIGHT: 199 LBS | HEIGHT: 70 IN

## 2024-02-26 DIAGNOSIS — I11.0 HYPERTENSIVE HEART DISEASE WITH CONGESTIVE HEART FAILURE, UNSPECIFIED HEART FAILURE TYPE: ICD-10-CM

## 2024-02-26 DIAGNOSIS — I48.19 PERSISTENT ATRIAL FIBRILLATION: Primary | ICD-10-CM

## 2024-02-26 PROCEDURE — 3077F SYST BP >= 140 MM HG: CPT | Performed by: PHYSICIAN ASSISTANT

## 2024-02-26 PROCEDURE — 3080F DIAST BP >= 90 MM HG: CPT | Performed by: PHYSICIAN ASSISTANT

## 2024-02-26 PROCEDURE — 99213 OFFICE O/P EST LOW 20 MIN: CPT | Performed by: PHYSICIAN ASSISTANT

## 2024-02-26 PROCEDURE — 93000 ELECTROCARDIOGRAM COMPLETE: CPT | Performed by: PHYSICIAN ASSISTANT

## 2024-02-26 NOTE — PROGRESS NOTES
Electrophysiology Clinic     Luis Gamboa  1945      02/26/24    DATE OF ADMISSION: (Not on file)  [facility] CARDIOLOGY    Anand De Souza MD  [address] / [place] [address]      Chief Complaint   Patient presents with    Atrial Fibrillation    Chest Pain    Shortness of Breath    Dizziness       Problem list:   Persistent Atrial Fibrillation   CHADSvasc = 3 started Eliquis 11/2022  Stress echocardiogram 9/12/2019: Estimated EF = 65%.Left ventricular systolic function is normal. Acceptable negative echocardiographic exercise stress test without anginal type chest discomfort, definitive ischemic EKG changes, or echocardiographic regional wall motion abnormality with preserved systolic left ventricular function following exercise to 85% predicted maximal heart rate and 88% predicted exercise capacity.Overall, low probability for significant focal obstructive coronary artery disease with preserved systolic left ventricular function  Diagnosed November 2022 in office  Event Monitor 11/10-11/24/2022: 100% AFIB with average HR 83 bpm  Echocardiogram 12/15/2022: EF 55%, no significant valve disease  Watchman device placement November 13, 2023  ARCHIE January 3, 2024 EF 55% mild to moderate MR number 27 mm watchman flex atrial appendage occlusion device in place.  Small jet of periprosthetic flow measuring 2 to 3 mm.  However consistent with adequate left atrial appendage occlusion.  Atypical chest pain  Remote negative stress test 2019  Myalgias  HTN  HLP  ZAY - on CPAP  GERD  Remote left LE DVT  BPH  Crohn's disease with partial colectomy and colostomy -Dr. Lester,   Anxiety/depression  Surgical History:  Partial colectomy  Hand surgery  Lithotripsy         History of Present Illness:   Luis Gamboa is a 77 year old male presents for follow up of Afib, s/p Watchman device.  He was seen 2 weeks ago for severe myalgias in the legs he attributed to new Crestor therapy.  Since stopping Crestor  he seems to feel a bit better.  He still some chest pain in the morning when he wakes up this seems to be better as well.  He is taking Protonix therapy.  He states he has had 1 or 2 nosebleeds on Plavix and aspirin.  These are not severe.  He states however in addition to having some weakness of the legs which is somewhat better off statins he is having joint pain in his shoulders hands hips.  He is not having any ongoing severe anginal type symptoms or heart failure symptoms.  He has remained asymptomatic from A-fib.      Allergies   Allergen Reactions    Imuran [Azathioprine] GI Intolerance      hemorrhagic urticaria.  Bleeding         Cannot display prior to admission medications because the patient has not been admitted in this contact.              Current Outpatient Medications:     amLODIPine (NORVASC) 5 MG tablet, Take 1 tablet by mouth Daily., Disp: 90 tablet, Rfl: 3    aspirin 81 MG EC tablet, Take 1 tablet by mouth Daily., Disp: , Rfl:     Budesonide (ENTOCORT EC) 3 MG 24 hr capsule, Take 2 capsules by mouth Take As Directed. Every 4th day, Disp: , Rfl:     clopidogrel (PLAVIX) 75 MG tablet, Take 1 tablet by mouth Daily., Disp: 30 tablet, Rfl: 6    diphenoxylate-atropine (LOMOTIL) 2.5-0.025 MG per tablet, Take 1 tablet by mouth As Needed for Diarrhea., Disp: , Rfl:     finasteride (PROSCAR) 5 MG tablet, Take 1 tablet by mouth Daily., Disp: , Rfl:     immune globulin, human, 1 GM/5ML solution, Inject  under the skin into the appropriate area as directed 1 (One) Time Per Week. Infusion, Disp: , Rfl:     latanoprost (XALATAN) 0.005 % ophthalmic solution, Administer 1 drop to both eyes Every Night., Disp: , Rfl:     magnesium oxide (MAGOX) 400 (241.3 MG) MG tablet tablet, Take 500 mg by mouth Daily., Disp: , Rfl:     Multiple Vitamins-Minerals (MULTIVITAMIN ADULT PO), Take 1 tablet by mouth Daily., Disp: , Rfl:     nitroglycerin (NITROSTAT) 0.4 MG SL tablet, 1 under the tongue as needed for angina, may  "repeat q5mins for up three doses, Disp: 100 tablet, Rfl: 11    Omega-3 Fatty Acids (FISH OIL) 1000 MG capsule capsule, Take 1 capsule by mouth Daily With Breakfast., Disp: , Rfl:     ondansetron ODT (ZOFRAN-ODT) 4 MG disintegrating tablet, Take 1 tablet by mouth Every 6 (Six) Hours As Needed for Nausea or Vomiting., Disp: 15 tablet, Rfl: 0    pantoprazole (Protonix) 40 MG EC tablet, Take 1 tablet by mouth Daily., Disp: 30 tablet, Rfl: 11    sertraline (ZOLOFT) 50 MG tablet, Take 1 tablet by mouth Daily., Disp: , Rfl:     tamsulosin (FLOMAX) 0.4 MG capsule 24 hr capsule, Take 1 capsule by mouth Daily., Disp: , Rfl:     vitamin C (ASCORBIC ACID) 500 MG tablet, Take 1 tablet by mouth Daily., Disp: , Rfl:     zolpidem (AMBIEN) 10 MG tablet, Take 1 tablet by mouth At Night As Needed for Sleep., Disp: , Rfl:     Social History     Socioeconomic History    Marital status:    Tobacco Use    Smoking status: Former     Types: Cigarettes     Quit date:      Years since quittin.1     Passive exposure: Never    Smokeless tobacco: Never   Vaping Use    Vaping Use: Never used   Substance and Sexual Activity    Alcohol use: No    Drug use: No    Sexual activity: Defer       Family History   Problem Relation Age of Onset    No Known Problems Mother     No Known Problems Father        Vitals:    24 1332   BP: 162/98   BP Location: Right arm   Patient Position: Sitting   Pulse: 92   SpO2: 91%   Weight: 90.3 kg (199 lb)   Height: 177.8 cm (70\")                   Physical Exam:  GEN: Well nourished, well-developed, no acute distress  HEENT: Normocephalic, atraumatic, PERRLA, moist mucous membranes  NECK: Supple, NO JVD, no thyromegaly, no lymphadenopathy  CARD: S1S2, irr, irr , no murmur, gallop, rub, PMI NL   LUNGS: Clear to auscultation, normal respiratory effort  ABDOMEN: Soft, nontender, normal bowel sounds  EXTREMITIES: No gross deformities, no clubbing, cyanosis, or edema  SKIN: Warm, dry  NEURO: No focal " "deficits, alert and oriented x 3  PSYCHIATRIC: Normal affect and mood      I personally viewed and interpreted the patient's EKG/Telemetry/lab data    Lab Results   Component Value Date    GLUCOSE 112 (H) 11/08/2023    CALCIUM 9.1 11/08/2023     11/08/2023    K 4.8 11/08/2023    CO2 29.0 11/08/2023     11/08/2023    BUN 20 11/08/2023    CREATININE 1.12 11/08/2023    EGFRIFAFRI 60 12/17/2021    EGFRIFNONA 49 12/17/2021    BCR 17.9 11/08/2023    ANIONGAP 9.0 11/08/2023     Lab Results   Component Value Date    WBC 5.64 11/08/2023    HGB 15.7 11/08/2023    HCT 46.2 11/08/2023    .2 (H) 11/08/2023     11/08/2023     Lab Results   Component Value Date    INR 1.10 11/08/2023    PROTIME 14.4 11/08/2023     No results found for: \"TSH\", \"G9HRAVD\", \"Q1WTCXP\", \"THYROIDAB\"          ECG 12 Lead    Date/Time: 2/26/2024 2:25 PM  Performed by: Connor Kelley PA    Authorized by: Connor Kelley PA  Comparison: compared with previous ECG from 2/9/2024  Similar to previous ECG  Rhythm: atrial fibrillation  Rate: normal  Conduction: conduction normal  ST Segments: ST segments normal  QRS axis: normal    Clinical impression: abnormal EKG            ICD-10-CM ICD-9-CM   1. Persistent atrial fibrillation  I48.19 427.31   2. Hypertensive heart disease with congestive heart failure, unspecified heart failure type  I11.0 402.91     428.0           Assessment and Plan:   1. Persistent Atrial Fibrillation:  - diagnosed incidentally at office visit 11/10/2022, unknown duration, but likely occurring after a viral illness in October 2022  - ECV 1/16/2023- Back in Afib since minimally symptomatic.    2. CHADSVASc = 3: s/p Watchman device 11/13/2023.  ARCHIE, small 2=3mm periprosthetic flow, however consistent with adequate left atrial appendage occlusion.  Normal EF, 55%. Mild MR Moderate MR> DAPT.       3. ZAY:  - on CPAP     4. Myalgias: Improved off of statin, will continue to hold Crestor for now.  He will " follow-up Jeannette Donato next few weeks could consider starting Zetia vs another statin.  He does seem to have more joint pain then would be attributed to myalgias from statin, would recommend follow-up PCP ruling out PMR other types of arthritis.    5.  Chest pain, atypical in nature previous negative stress test 2019.  Repeat Lexiscan cardiac stress test rule out ischemia.  He will given sublingual nitroglycerin.  If chest pain  increases in nature would present to ER.  He still has not scheduled this stress test he is going to do that today.    Follow-up as scheduled  Electronically signed by PAULO Lorenzo, 02/26/24, 2:27 PM EST.

## 2024-03-06 ENCOUNTER — OFFICE VISIT (OUTPATIENT)
Dept: CARDIOLOGY | Facility: CLINIC | Age: 79
End: 2024-03-06
Payer: MEDICARE

## 2024-03-06 ENCOUNTER — HOSPITAL ENCOUNTER (OUTPATIENT)
Dept: CARDIOLOGY | Facility: HOSPITAL | Age: 79
Discharge: HOME | End: 2024-03-06
Payer: MEDICARE

## 2024-03-06 VITALS
BODY MASS INDEX: 28.63 KG/M2 | DIASTOLIC BLOOD PRESSURE: 70 MMHG | HEART RATE: 71 BPM | HEIGHT: 70 IN | SYSTOLIC BLOOD PRESSURE: 150 MMHG | OXYGEN SATURATION: 99 % | WEIGHT: 200 LBS

## 2024-03-06 VITALS — HEART RATE: 95 BPM | SYSTOLIC BLOOD PRESSURE: 148 MMHG | OXYGEN SATURATION: 97 % | DIASTOLIC BLOOD PRESSURE: 80 MMHG

## 2024-03-06 DIAGNOSIS — R07.89 CHEST PAIN, ATYPICAL: ICD-10-CM

## 2024-03-06 DIAGNOSIS — E78.5 DYSLIPIDEMIA: ICD-10-CM

## 2024-03-06 DIAGNOSIS — I11.9 HYPERTENSIVE HEART DISEASE WITHOUT HEART FAILURE: ICD-10-CM

## 2024-03-06 DIAGNOSIS — I48.19 ATRIAL FIBRILLATION, PERSISTENT: Primary | ICD-10-CM

## 2024-03-06 DIAGNOSIS — I48.0 PAROXYSMAL ATRIAL FIBRILLATION: ICD-10-CM

## 2024-03-06 DIAGNOSIS — R09.89 LABILE HYPERTENSION: ICD-10-CM

## 2024-03-06 DIAGNOSIS — I11.0 HYPERTENSIVE HEART DISEASE WITH CONGESTIVE HEART FAILURE, UNSPECIFIED HEART FAILURE TYPE: ICD-10-CM

## 2024-03-06 LAB
BH CV REST NUCLEAR ISOTOPE DOSE: 9.9 MCI
BH CV STRESS BP STAGE 2: NORMAL
BH CV STRESS BP STAGE 4: NORMAL
BH CV STRESS COMMENTS STAGE 1: NORMAL
BH CV STRESS DOSE REGADENOSON STAGE 1: 0.4
BH CV STRESS DURATION MIN STAGE 1: 1
BH CV STRESS DURATION MIN STAGE 2: 1
BH CV STRESS DURATION MIN STAGE 3: 1
BH CV STRESS DURATION MIN STAGE 4: 1
BH CV STRESS DURATION SEC STAGE 1: 0
BH CV STRESS DURATION SEC STAGE 2: 0
BH CV STRESS DURATION SEC STAGE 3: 0
BH CV STRESS DURATION SEC STAGE 4: 0
BH CV STRESS HR STAGE 1: 93
BH CV STRESS HR STAGE 2: 120
BH CV STRESS HR STAGE 3: 109
BH CV STRESS HR STAGE 4: 110
BH CV STRESS NUCLEAR ISOTOPE DOSE: 33 MCI
BH CV STRESS O2 STAGE 1: 99
BH CV STRESS O2 STAGE 2: 97
BH CV STRESS O2 STAGE 3: 96
BH CV STRESS O2 STAGE 4: 99
BH CV STRESS PROTOCOL 1: NORMAL
BH CV STRESS RECOVERY BP: NORMAL MMHG
BH CV STRESS RECOVERY HR: 106 BPM
BH CV STRESS RECOVERY O2: 98 %
BH CV STRESS STAGE 1: 1
BH CV STRESS STAGE 2: 2
BH CV STRESS STAGE 3: 3
BH CV STRESS STAGE 4: 4
LV EF NUC BP: 57 %
MAXIMAL PREDICTED HEART RATE: 143 BPM
PERCENT MAX PREDICTED HR: 86.01 %
STRESS BASELINE BP: NORMAL MMHG
STRESS BASELINE HR: 95 BPM
STRESS O2 SAT REST: 98 %
STRESS PERCENT HR: 101 %
STRESS POST ESTIMATED WORKLOAD: 1 METS
STRESS POST EXERCISE DUR MIN: 4 MIN
STRESS POST EXERCISE DUR SEC: 0 SEC
STRESS POST O2 SAT PEAK: 97 %
STRESS POST PEAK BP: NORMAL MMHG
STRESS POST PEAK HR: 123 BPM
STRESS TARGET HR: 122 BPM

## 2024-03-06 PROCEDURE — 1159F MED LIST DOCD IN RCRD: CPT | Performed by: NURSE PRACTITIONER

## 2024-03-06 PROCEDURE — 78452 HT MUSCLE IMAGE SPECT MULT: CPT

## 2024-03-06 PROCEDURE — 0 TECHNETIUM SESTAMIBI: Performed by: PHYSICIAN ASSISTANT

## 2024-03-06 PROCEDURE — 3078F DIAST BP <80 MM HG: CPT | Performed by: NURSE PRACTITIONER

## 2024-03-06 PROCEDURE — 99214 OFFICE O/P EST MOD 30 MIN: CPT | Performed by: NURSE PRACTITIONER

## 2024-03-06 PROCEDURE — 78452 HT MUSCLE IMAGE SPECT MULT: CPT | Performed by: INTERNAL MEDICINE

## 2024-03-06 PROCEDURE — 93017 CV STRESS TEST TRACING ONLY: CPT

## 2024-03-06 PROCEDURE — 93018 CV STRESS TEST I&R ONLY: CPT | Performed by: INTERNAL MEDICINE

## 2024-03-06 PROCEDURE — 1160F RVW MEDS BY RX/DR IN RCRD: CPT | Performed by: NURSE PRACTITIONER

## 2024-03-06 PROCEDURE — 25010000002 REGADENOSON 0.4 MG/5ML SOLUTION: Performed by: PHYSICIAN ASSISTANT

## 2024-03-06 PROCEDURE — A9500 TC99M SESTAMIBI: HCPCS | Performed by: PHYSICIAN ASSISTANT

## 2024-03-06 PROCEDURE — 3077F SYST BP >= 140 MM HG: CPT | Performed by: NURSE PRACTITIONER

## 2024-03-06 RX ORDER — CLOPIDOGREL BISULFATE 75 MG/1
75 TABLET ORAL DAILY
Qty: 30 TABLET | Refills: 3 | Status: SHIPPED | OUTPATIENT
Start: 2024-03-06

## 2024-03-06 RX ORDER — REGADENOSON 0.08 MG/ML
0.4 INJECTION, SOLUTION INTRAVENOUS ONCE
Status: COMPLETED | OUTPATIENT
Start: 2024-03-06 | End: 2024-03-06

## 2024-03-06 RX ORDER — ASPIRIN 81 MG/1
81 TABLET ORAL DAILY
Qty: 90 TABLET | Refills: 3 | Status: SHIPPED | OUTPATIENT
Start: 2024-03-06

## 2024-03-06 RX ORDER — NEBIVOLOL 5 MG/1
5 TABLET ORAL DAILY
Qty: 30 TABLET | Refills: 5 | Status: SHIPPED | OUTPATIENT
Start: 2024-03-06

## 2024-03-06 RX ADMIN — TECHNETIUM TC 99M SESTAMIBI 1 DOSE: 1 INJECTION INTRAVENOUS at 11:00

## 2024-03-06 RX ADMIN — TECHNETIUM TC 99M SESTAMIBI 1 DOSE: 1 INJECTION INTRAVENOUS at 12:40

## 2024-03-06 RX ADMIN — REGADENOSON 0.4 MG: 0.08 INJECTION, SOLUTION INTRAVENOUS at 12:37

## 2024-03-15 DIAGNOSIS — E78.5 DYSLIPIDEMIA: Primary | ICD-10-CM

## 2024-04-05 ENCOUNTER — TELEPHONE (OUTPATIENT)
Dept: NEUROSURGERY | Facility: CLINIC | Age: 79
End: 2024-04-05

## 2024-04-10 ENCOUNTER — APPOINTMENT (OUTPATIENT)
Dept: CT IMAGING | Facility: HOSPITAL | Age: 79
DRG: 305 | End: 2024-04-10
Payer: MEDICARE

## 2024-04-10 ENCOUNTER — APPOINTMENT (OUTPATIENT)
Dept: GENERAL RADIOLOGY | Facility: HOSPITAL | Age: 79
DRG: 305 | End: 2024-04-10
Payer: MEDICARE

## 2024-04-10 ENCOUNTER — OFFICE VISIT (OUTPATIENT)
Dept: NEUROSURGERY | Facility: CLINIC | Age: 79
End: 2024-04-10
Payer: MEDICARE

## 2024-04-10 ENCOUNTER — HOSPITAL ENCOUNTER (INPATIENT)
Facility: HOSPITAL | Age: 79
LOS: 1 days | Discharge: HOME | DRG: 305 | End: 2024-04-12
Attending: EMERGENCY MEDICINE | Admitting: INTERNAL MEDICINE
Payer: MEDICARE

## 2024-04-10 ENCOUNTER — TELEPHONE (OUTPATIENT)
Dept: WOUND CARE | Facility: HOSPITAL | Age: 79
End: 2024-04-10
Payer: MEDICARE

## 2024-04-10 VITALS — BODY MASS INDEX: 27.13 KG/M2 | WEIGHT: 193.8 LBS | HEIGHT: 71 IN | TEMPERATURE: 97 F

## 2024-04-10 DIAGNOSIS — M54.16 LUMBAR RADICULOPATHY: Primary | ICD-10-CM

## 2024-04-10 DIAGNOSIS — R06.00 DYSPNEA, UNSPECIFIED TYPE: Primary | ICD-10-CM

## 2024-04-10 DIAGNOSIS — I16.0 HYPERTENSIVE URGENCY: ICD-10-CM

## 2024-04-10 PROBLEM — K21.9 GERD WITHOUT ESOPHAGITIS: Status: ACTIVE | Noted: 2024-04-10

## 2024-04-10 PROBLEM — R19.00 RETROPERITONEAL MASS: Status: ACTIVE | Noted: 2024-04-10

## 2024-04-10 PROBLEM — R79.89 ELEVATED SERUM CREATININE: Status: ACTIVE | Noted: 2024-04-10

## 2024-04-10 PROBLEM — J90 PLEURAL EFFUSION: Status: ACTIVE | Noted: 2024-04-10

## 2024-04-10 LAB
ALBUMIN SERPL-MCNC: 4.3 G/DL (ref 3.5–5.2)
ALBUMIN/GLOB SERPL: 1.4 G/DL
ALP SERPL-CCNC: 88 U/L (ref 39–117)
ALT SERPL W P-5'-P-CCNC: 16 U/L (ref 1–41)
ANION GAP SERPL CALCULATED.3IONS-SCNC: 11 MMOL/L (ref 5–15)
AST SERPL-CCNC: 26 U/L (ref 1–40)
BASOPHILS # BLD AUTO: 0.03 10*3/MM3 (ref 0–0.2)
BASOPHILS NFR BLD AUTO: 0.6 % (ref 0–1.5)
BILIRUB SERPL-MCNC: 0.8 MG/DL (ref 0–1.2)
BILIRUB UR QL STRIP: NEGATIVE
BUN SERPL-MCNC: 19 MG/DL (ref 8–23)
BUN/CREAT SERPL: 13.3 (ref 7–25)
CALCIUM SPEC-SCNC: 9.5 MG/DL (ref 8.6–10.5)
CHLORIDE SERPL-SCNC: 100 MMOL/L (ref 98–107)
CLARITY UR: CLEAR
CO2 SERPL-SCNC: 28 MMOL/L (ref 22–29)
COLOR UR: YELLOW
CREAT SERPL-MCNC: 1.43 MG/DL (ref 0.76–1.27)
DEPRECATED RDW RBC AUTO: 47.2 FL (ref 37–54)
EGFRCR SERPLBLD CKD-EPI 2021: 50.5 ML/MIN/1.73
EOSINOPHIL # BLD AUTO: 0.06 10*3/MM3 (ref 0–0.4)
EOSINOPHIL NFR BLD AUTO: 1.1 % (ref 0.3–6.2)
ERYTHROCYTE [DISTWIDTH] IN BLOOD BY AUTOMATED COUNT: 13.2 % (ref 12.3–15.4)
FLUAV RNA RESP QL NAA+PROBE: NOT DETECTED
FLUBV RNA RESP QL NAA+PROBE: NOT DETECTED
GLOBULIN UR ELPH-MCNC: 3 GM/DL
GLUCOSE SERPL-MCNC: 97 MG/DL (ref 65–99)
GLUCOSE UR STRIP-MCNC: NEGATIVE MG/DL
HCT VFR BLD AUTO: 46.2 % (ref 37.5–51)
HGB BLD-MCNC: 15.8 G/DL (ref 13–17.7)
HGB UR QL STRIP.AUTO: NEGATIVE
HOLD SPECIMEN: NORMAL
IMM GRANULOCYTES # BLD AUTO: 0.04 10*3/MM3 (ref 0–0.05)
IMM GRANULOCYTES NFR BLD AUTO: 0.7 % (ref 0–0.5)
KETONES UR QL STRIP: ABNORMAL
LEUKOCYTE ESTERASE UR QL STRIP.AUTO: NEGATIVE
LYMPHOCYTES # BLD AUTO: 0.63 10*3/MM3 (ref 0.7–3.1)
LYMPHOCYTES NFR BLD AUTO: 11.6 % (ref 19.6–45.3)
MCH RBC QN AUTO: 33 PG (ref 26.6–33)
MCHC RBC AUTO-ENTMCNC: 34.2 G/DL (ref 31.5–35.7)
MCV RBC AUTO: 96.5 FL (ref 79–97)
MONOCYTES # BLD AUTO: 0.5 10*3/MM3 (ref 0.1–0.9)
MONOCYTES NFR BLD AUTO: 9.2 % (ref 5–12)
NEUTROPHILS NFR BLD AUTO: 4.16 10*3/MM3 (ref 1.7–7)
NEUTROPHILS NFR BLD AUTO: 76.8 % (ref 42.7–76)
NITRITE UR QL STRIP: NEGATIVE
NRBC BLD AUTO-RTO: 0 /100 WBC (ref 0–0.2)
NT-PROBNP SERPL-MCNC: 9182 PG/ML (ref 0–1800)
PH UR STRIP.AUTO: 7.5 [PH] (ref 5–8)
PLATELET # BLD AUTO: 212 10*3/MM3 (ref 140–450)
PMV BLD AUTO: 9.5 FL (ref 6–12)
POTASSIUM SERPL-SCNC: 4.1 MMOL/L (ref 3.5–5.2)
PROT ?TM UR-MCNC: 18.8 MG/DL
PROT SERPL-MCNC: 7.3 G/DL (ref 6–8.5)
PROT UR QL STRIP: ABNORMAL
QT INTERVAL: 406 MS
QTC INTERVAL: 479 MS
RBC # BLD AUTO: 4.79 10*6/MM3 (ref 4.14–5.8)
SARS-COV-2 RNA RESP QL NAA+PROBE: NOT DETECTED
SODIUM SERPL-SCNC: 139 MMOL/L (ref 136–145)
SODIUM UR-SCNC: 94 MMOL/L
SP GR UR STRIP: 1.04 (ref 1–1.03)
TROPONIN T SERPL HS-MCNC: 22 NG/L
TROPONIN T SERPL HS-MCNC: 25 NG/L
UROBILINOGEN UR QL STRIP: ABNORMAL
WBC NRBC COR # BLD AUTO: 5.42 10*3/MM3 (ref 3.4–10.8)
WHOLE BLOOD HOLD COAG: NORMAL
WHOLE BLOOD HOLD SPECIMEN: NORMAL

## 2024-04-10 PROCEDURE — 93005 ELECTROCARDIOGRAM TRACING: CPT | Performed by: EMERGENCY MEDICINE

## 2024-04-10 PROCEDURE — G0378 HOSPITAL OBSERVATION PER HR: HCPCS

## 2024-04-10 PROCEDURE — 84300 ASSAY OF URINE SODIUM: CPT | Performed by: NURSE PRACTITIONER

## 2024-04-10 PROCEDURE — 84484 ASSAY OF TROPONIN QUANT: CPT | Performed by: EMERGENCY MEDICINE

## 2024-04-10 PROCEDURE — 71045 X-RAY EXAM CHEST 1 VIEW: CPT

## 2024-04-10 PROCEDURE — 36415 COLL VENOUS BLD VENIPUNCTURE: CPT

## 2024-04-10 PROCEDURE — 82570 ASSAY OF URINE CREATININE: CPT | Performed by: NURSE PRACTITIONER

## 2024-04-10 PROCEDURE — 71275 CT ANGIOGRAPHY CHEST: CPT

## 2024-04-10 PROCEDURE — G0103 PSA SCREENING: HCPCS | Performed by: NURSE PRACTITIONER

## 2024-04-10 PROCEDURE — 80053 COMPREHEN METABOLIC PANEL: CPT | Performed by: EMERGENCY MEDICINE

## 2024-04-10 PROCEDURE — 25510000001 IOPAMIDOL 61 % SOLUTION: Performed by: EMERGENCY MEDICINE

## 2024-04-10 PROCEDURE — 1159F MED LIST DOCD IN RCRD: CPT | Performed by: NEUROLOGICAL SURGERY

## 2024-04-10 PROCEDURE — 85025 COMPLETE CBC W/AUTO DIFF WBC: CPT | Performed by: EMERGENCY MEDICINE

## 2024-04-10 PROCEDURE — 99291 CRITICAL CARE FIRST HOUR: CPT

## 2024-04-10 PROCEDURE — 1160F RVW MEDS BY RX/DR IN RCRD: CPT | Performed by: NEUROLOGICAL SURGERY

## 2024-04-10 PROCEDURE — 25010000002 PROCHLORPERAZINE 10 MG/2ML SOLUTION: Performed by: PHYSICIAN ASSISTANT

## 2024-04-10 PROCEDURE — 81003 URINALYSIS AUTO W/O SCOPE: CPT | Performed by: NURSE PRACTITIONER

## 2024-04-10 PROCEDURE — 84484 ASSAY OF TROPONIN QUANT: CPT | Performed by: PHYSICIAN ASSISTANT

## 2024-04-10 PROCEDURE — 84540 ASSAY OF URINE/UREA-N: CPT | Performed by: NURSE PRACTITIONER

## 2024-04-10 PROCEDURE — 84156 ASSAY OF PROTEIN URINE: CPT | Performed by: NURSE PRACTITIONER

## 2024-04-10 PROCEDURE — 25010000002 ONDANSETRON PER 1 MG: Performed by: PHYSICIAN ASSISTANT

## 2024-04-10 PROCEDURE — 99204 OFFICE O/P NEW MOD 45 MIN: CPT | Performed by: NEUROLOGICAL SURGERY

## 2024-04-10 PROCEDURE — 99223 1ST HOSP IP/OBS HIGH 75: CPT | Performed by: NURSE PRACTITIONER

## 2024-04-10 PROCEDURE — 25510000001 IOPAMIDOL PER 1 ML: Performed by: EMERGENCY MEDICINE

## 2024-04-10 PROCEDURE — 74177 CT ABD & PELVIS W/CONTRAST: CPT

## 2024-04-10 PROCEDURE — 25010000002 MORPHINE PER 10 MG: Performed by: EMERGENCY MEDICINE

## 2024-04-10 PROCEDURE — 83880 ASSAY OF NATRIURETIC PEPTIDE: CPT | Performed by: EMERGENCY MEDICINE

## 2024-04-10 PROCEDURE — 25010000002 HYDRALAZINE PER 20 MG: Performed by: PHYSICIAN ASSISTANT

## 2024-04-10 PROCEDURE — 87636 SARSCOV2 & INF A&B AMP PRB: CPT | Performed by: PHYSICIAN ASSISTANT

## 2024-04-10 PROCEDURE — 25010000002 LABETALOL 5 MG/ML SOLUTION: Performed by: PHYSICIAN ASSISTANT

## 2024-04-10 RX ORDER — POLYETHYLENE GLYCOL 3350 17 G/17G
17 POWDER, FOR SOLUTION ORAL DAILY PRN
Status: DISCONTINUED | OUTPATIENT
Start: 2024-04-10 | End: 2024-04-12 | Stop reason: HOSPADM

## 2024-04-10 RX ORDER — BISACODYL 10 MG
10 SUPPOSITORY, RECTAL RECTAL DAILY PRN
Status: DISCONTINUED | OUTPATIENT
Start: 2024-04-10 | End: 2024-04-12 | Stop reason: HOSPADM

## 2024-04-10 RX ORDER — GABAPENTIN 300 MG/1
CAPSULE ORAL
Qty: 81 CAPSULE | Refills: 1 | Status: CANCELLED | OUTPATIENT
Start: 2024-04-10 | End: 2024-05-10

## 2024-04-10 RX ORDER — FINASTERIDE 5 MG/1
5 TABLET, FILM COATED ORAL DAILY
Status: DISCONTINUED | OUTPATIENT
Start: 2024-04-11 | End: 2024-04-12 | Stop reason: HOSPADM

## 2024-04-10 RX ORDER — SODIUM CHLORIDE 9 MG/ML
40 INJECTION, SOLUTION INTRAVENOUS AS NEEDED
Status: DISCONTINUED | OUTPATIENT
Start: 2024-04-10 | End: 2024-04-12 | Stop reason: HOSPADM

## 2024-04-10 RX ORDER — ASCORBIC ACID 500 MG
500 TABLET ORAL DAILY
Status: DISCONTINUED | OUTPATIENT
Start: 2024-04-11 | End: 2024-04-12 | Stop reason: HOSPADM

## 2024-04-10 RX ORDER — ONDANSETRON 2 MG/ML
4 INJECTION INTRAMUSCULAR; INTRAVENOUS ONCE
Status: COMPLETED | OUTPATIENT
Start: 2024-04-10 | End: 2024-04-10

## 2024-04-10 RX ORDER — GABAPENTIN 300 MG/1
600 CAPSULE ORAL
COMMUNITY
Start: 2024-02-15 | End: 2024-04-10

## 2024-04-10 RX ORDER — PROCHLORPERAZINE EDISYLATE 5 MG/ML
5 INJECTION INTRAMUSCULAR; INTRAVENOUS EVERY 6 HOURS PRN
Status: DISCONTINUED | OUTPATIENT
Start: 2024-04-10 | End: 2024-04-10

## 2024-04-10 RX ORDER — SODIUM CHLORIDE 0.9 % (FLUSH) 0.9 %
10 SYRINGE (ML) INJECTION EVERY 12 HOURS SCHEDULED
Status: DISCONTINUED | OUTPATIENT
Start: 2024-04-10 | End: 2024-04-12 | Stop reason: HOSPADM

## 2024-04-10 RX ORDER — LABETALOL HYDROCHLORIDE 5 MG/ML
10 INJECTION, SOLUTION INTRAVENOUS ONCE
Status: COMPLETED | OUTPATIENT
Start: 2024-04-10 | End: 2024-04-10

## 2024-04-10 RX ORDER — BUDESONIDE 3 MG/1
6 CAPSULE, COATED PELLETS ORAL DAILY
Status: DISCONTINUED | OUTPATIENT
Start: 2024-04-11 | End: 2024-04-12 | Stop reason: HOSPADM

## 2024-04-10 RX ORDER — LATANOPROST 50 UG/ML
1 SOLUTION/ DROPS OPHTHALMIC NIGHTLY
Status: DISCONTINUED | OUTPATIENT
Start: 2024-04-10 | End: 2024-04-12 | Stop reason: HOSPADM

## 2024-04-10 RX ORDER — ASPIRIN 81 MG/1
81 TABLET ORAL DAILY
Status: DISCONTINUED | OUTPATIENT
Start: 2024-04-11 | End: 2024-04-12 | Stop reason: HOSPADM

## 2024-04-10 RX ORDER — TAMSULOSIN HYDROCHLORIDE 0.4 MG/1
0.4 CAPSULE ORAL DAILY
Status: DISCONTINUED | OUTPATIENT
Start: 2024-04-11 | End: 2024-04-12 | Stop reason: HOSPADM

## 2024-04-10 RX ORDER — HEPARIN SODIUM 5000 [USP'U]/ML
5000 INJECTION, SOLUTION INTRAVENOUS; SUBCUTANEOUS EVERY 12 HOURS SCHEDULED
Status: DISCONTINUED | OUTPATIENT
Start: 2024-04-10 | End: 2024-04-12 | Stop reason: HOSPADM

## 2024-04-10 RX ORDER — BISACODYL 5 MG/1
5 TABLET, DELAYED RELEASE ORAL DAILY PRN
Status: DISCONTINUED | OUTPATIENT
Start: 2024-04-10 | End: 2024-04-12 | Stop reason: HOSPADM

## 2024-04-10 RX ORDER — ONDANSETRON 2 MG/ML
4 INJECTION INTRAMUSCULAR; INTRAVENOUS EVERY 6 HOURS PRN
Status: DISCONTINUED | OUTPATIENT
Start: 2024-04-10 | End: 2024-04-12 | Stop reason: HOSPADM

## 2024-04-10 RX ORDER — SODIUM CHLORIDE 0.9 % (FLUSH) 0.9 %
10 SYRINGE (ML) INJECTION AS NEEDED
Status: DISCONTINUED | OUTPATIENT
Start: 2024-04-10 | End: 2024-04-12 | Stop reason: HOSPADM

## 2024-04-10 RX ORDER — NEBIVOLOL 5 MG/1
5 TABLET ORAL DAILY
Status: DISCONTINUED | OUTPATIENT
Start: 2024-04-10 | End: 2024-04-12 | Stop reason: HOSPADM

## 2024-04-10 RX ORDER — PROCHLORPERAZINE EDISYLATE 5 MG/ML
5 INJECTION INTRAMUSCULAR; INTRAVENOUS ONCE
Status: COMPLETED | OUTPATIENT
Start: 2024-04-10 | End: 2024-04-10

## 2024-04-10 RX ORDER — ACETAMINOPHEN 325 MG/1
650 TABLET ORAL EVERY 4 HOURS PRN
Status: DISCONTINUED | OUTPATIENT
Start: 2024-04-10 | End: 2024-04-12 | Stop reason: HOSPADM

## 2024-04-10 RX ORDER — GABAPENTIN 300 MG/1
300 CAPSULE ORAL EVERY 8 HOURS SCHEDULED
Status: DISCONTINUED | OUTPATIENT
Start: 2024-04-10 | End: 2024-04-12 | Stop reason: HOSPADM

## 2024-04-10 RX ORDER — CHOLECALCIFEROL (VITAMIN D3) 125 MCG
5 CAPSULE ORAL NIGHTLY PRN
Status: DISCONTINUED | OUTPATIENT
Start: 2024-04-10 | End: 2024-04-12 | Stop reason: HOSPADM

## 2024-04-10 RX ORDER — ONDANSETRON 4 MG/1
4 TABLET, ORALLY DISINTEGRATING ORAL EVERY 6 HOURS PRN
Status: DISCONTINUED | OUTPATIENT
Start: 2024-04-10 | End: 2024-04-12 | Stop reason: HOSPADM

## 2024-04-10 RX ORDER — MULTIPLE VITAMINS W/ MINERALS TAB 9MG-400MCG
1 TAB ORAL DAILY
Status: DISCONTINUED | OUTPATIENT
Start: 2024-04-11 | End: 2024-04-12 | Stop reason: HOSPADM

## 2024-04-10 RX ORDER — HYDROXYZINE HYDROCHLORIDE 10 MG/1
10 TABLET, FILM COATED ORAL ONCE
Status: COMPLETED | OUTPATIENT
Start: 2024-04-11 | End: 2024-04-11

## 2024-04-10 RX ORDER — NITROGLYCERIN 0.4 MG/1
0.4 TABLET SUBLINGUAL
Status: DISCONTINUED | OUTPATIENT
Start: 2024-04-10 | End: 2024-04-12 | Stop reason: HOSPADM

## 2024-04-10 RX ORDER — HYDROCODONE BITARTRATE AND ACETAMINOPHEN 5; 325 MG/1; MG/1
1 TABLET ORAL EVERY 4 HOURS PRN
Status: DISCONTINUED | OUTPATIENT
Start: 2024-04-10 | End: 2024-04-12 | Stop reason: HOSPADM

## 2024-04-10 RX ORDER — MORPHINE SULFATE 2 MG/ML
2 INJECTION, SOLUTION INTRAMUSCULAR; INTRAVENOUS ONCE
Status: COMPLETED | OUTPATIENT
Start: 2024-04-10 | End: 2024-04-10

## 2024-04-10 RX ORDER — AMOXICILLIN 250 MG
2 CAPSULE ORAL 2 TIMES DAILY PRN
Status: DISCONTINUED | OUTPATIENT
Start: 2024-04-10 | End: 2024-04-12 | Stop reason: HOSPADM

## 2024-04-10 RX ORDER — ACETAMINOPHEN 160 MG/5ML
650 SOLUTION ORAL EVERY 4 HOURS PRN
Status: DISCONTINUED | OUTPATIENT
Start: 2024-04-10 | End: 2024-04-12 | Stop reason: HOSPADM

## 2024-04-10 RX ORDER — HYDRALAZINE HYDROCHLORIDE 20 MG/ML
10 INJECTION INTRAMUSCULAR; INTRAVENOUS ONCE
Status: COMPLETED | OUTPATIENT
Start: 2024-04-10 | End: 2024-04-10

## 2024-04-10 RX ORDER — ACETAMINOPHEN 650 MG/1
650 SUPPOSITORY RECTAL EVERY 4 HOURS PRN
Status: DISCONTINUED | OUTPATIENT
Start: 2024-04-10 | End: 2024-04-12 | Stop reason: HOSPADM

## 2024-04-10 RX ORDER — PANTOPRAZOLE SODIUM 40 MG/1
40 TABLET, DELAYED RELEASE ORAL DAILY
Status: DISCONTINUED | OUTPATIENT
Start: 2024-04-11 | End: 2024-04-12 | Stop reason: HOSPADM

## 2024-04-10 RX ADMIN — HYDRALAZINE HYDROCHLORIDE 10 MG: 20 INJECTION INTRAMUSCULAR; INTRAVENOUS at 14:23

## 2024-04-10 RX ADMIN — IOPAMIDOL 80 ML: 612 INJECTION, SOLUTION INTRAVENOUS at 18:17

## 2024-04-10 RX ADMIN — Medication 10 ML: at 23:35

## 2024-04-10 RX ADMIN — MORPHINE SULFATE 2 MG: 2 INJECTION, SOLUTION INTRAMUSCULAR; INTRAVENOUS at 21:08

## 2024-04-10 RX ADMIN — NICARDIPINE HYDROCHLORIDE 5 MG/HR: 0.1 INJECTION INTRAVENOUS at 21:18

## 2024-04-10 RX ADMIN — ONDANSETRON 4 MG: 2 INJECTION INTRAMUSCULAR; INTRAVENOUS at 16:41

## 2024-04-10 RX ADMIN — LABETALOL HYDROCHLORIDE 10 MG: 5 INJECTION, SOLUTION INTRAVENOUS at 17:07

## 2024-04-10 RX ADMIN — IOPAMIDOL 85 ML: 755 INJECTION, SOLUTION INTRAVENOUS at 16:27

## 2024-04-10 RX ADMIN — PROCHLORPERAZINE EDISYLATE 5 MG: 5 INJECTION INTRAMUSCULAR; INTRAVENOUS at 20:21

## 2024-04-10 NOTE — Clinical Note
Level of Care: Telemetry [5]   Diagnosis: Dyspnea [071135]   Admitting Physician: HIPOLITO MORRISON [[id number]]

## 2024-04-10 NOTE — PROGRESS NOTES
Patient: Luis Gamboa  : 1945    Primary Care Provider: Anand De Sozua MD    Requesting Provider: As above        History    Chief Complaint: Low back and left leg pain with sensory alteration.    History of Present Illness: Mr. 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  He has had some low-grade back problems in the past.  Since February he has had severe back pain that extends into the L5 distribution of his left lower extremity.  He denies any inciting or precipitating events.  He has some numbness.  He has no right lower extremity symptoms.  Sometimes lying down is helpful.  He is worse with movement.  He developed severe nausea and abandoned gabapentin and hydrocodone.    Review of Systems   Constitutional:  Positive for activity change, appetite change, chills, diaphoresis, fatigue and unexpected weight change. Negative for fever.   HENT:  Positive for hearing loss. Negative for congestion, dental problem, drooling, ear discharge, ear pain, facial swelling, mouth sores, nosebleeds, postnasal drip, rhinorrhea, sinus pressure, sinus pain, sneezing, sore throat, tinnitus, trouble swallowing and voice change.    Eyes:  Negative for photophobia, pain, discharge, redness, itching and visual disturbance.   Respiratory:  Positive for apnea, chest tightness and shortness of breath. Negative for cough, choking, wheezing and stridor.    Cardiovascular:  Positive for palpitations. Negative for chest pain and leg swelling.   Gastrointestinal:  Positive for constipation and nausea. Negative for abdominal distention, abdominal pain, anal bleeding, blood in stool, diarrhea, rectal pain and vomiting.   Endocrine: Positive for polydipsia and polyuria. Negative for cold intolerance, heat intolerance and polyphagia.   Genitourinary:  Positive for frequency. Negative for decreased urine volume, difficulty urinating, dysuria, enuresis, flank pain, genital  "sores, hematuria, penile discharge, penile pain, penile swelling, scrotal swelling, testicular pain and urgency.   Musculoskeletal:  Positive for arthralgias, back pain and myalgias. Negative for gait problem, joint swelling, neck pain and neck stiffness.   Skin:  Negative for color change, pallor, rash and wound.   Allergic/Immunologic: Negative for environmental allergies, food allergies and immunocompromised state.   Neurological:  Positive for dizziness, tremors, weakness, light-headedness and headaches. Negative for seizures, syncope, facial asymmetry, speech difficulty and numbness.   Hematological:  Negative for adenopathy. Bruises/bleeds easily.   Psychiatric/Behavioral:  Positive for decreased concentration, dysphoric mood and sleep disturbance. Negative for agitation, behavioral problems, confusion, hallucinations, self-injury and suicidal ideas. The patient is nervous/anxious. The patient is not hyperactive.      The patient's past medical history, past surgical history, family history, and social history have been reviewed at length in the electronic medical record.      Physical Exam:   Temp 97 °F (36.1 °C) (Infrared)   Ht 180.3 cm (71\")   Wt 87.9 kg (193 lb 12.8 oz)   BMI 27.03 kg/m²   CONSTITUTIONAL: Patient is well-nourished, pleasant and appears stated age.  MUSCULOSKELETAL:  Straight leg raising is moderately positive on the left at 40 degrees.  Richardson's Sign is negative.  ROM in the low back is normal.  Tenderness in the back to palpation is not observed.  NEUROLOGICAL:  Orientation, memory, attention span, language function, and cognition have been examined and are intact.  Strength is intact in the lower extremities to direct testing.  Muscle tone is normal throughout.  Station and gait are normal.  Sensation is intact to light touch testing throughout.  Deep tendon reflexes are 1+ and symmetrical.  Coordination is intact.      Medical Decision Making    Data Review:   (All imaging studies " were personally reviewed unless stated otherwise)  MRI of the lumbar spine dated 3/27/2024 demonstrates a generous disc extrusion into the recess and even gutter on the left at L4-5.  This provides clinical correlation.    Diagnosis:   Left L5 radiculopathy secondary to disc protrusion.    Treatment Options:   I have restarted his gabapentin on a graded basis to 300 mg 3 times a day.  I have referred him to physical therapy.  He will follow-up with me in about 3 weeks.  If he is not doing better then I would strongly suggest left L4-5 discectomy.      Scribed for Dave Dillard MD by Bhavani Castro MA on 4/10/2024 12:14 EDT      I, Dr. Dillard, personally performed the services described in the documentation, as scribed in my presence, and it is both accurate and complete.

## 2024-04-10 NOTE — TELEPHONE ENCOUNTER
Luis Gamboa  1945  [id number]    Summary: Patient's wife called over concerns about new raw irritated skin around his colostomy that he has had since 2001.  Apparently patient became constipated after being on pain medication for herniated disc.  When patient's bowels finally moved appliance leaked and feces was on skin for unknown amount of time.  According to wife patient sat for 24 hours with stomach exposed and no barrier on to let the skin air out, patient stated that skin was too raw to apply barrier.  Gave recommendations on using light dusting of powder on raw skin.  Also gave recommendations for Domeboro medicated soak which can be picked up at [employer] and usually any drugstore.  Instructions on how to use this product.  Encouraged after using product to have barrier in place as it is breathable and will help prevent further stool from sitting on skin.  According to wife patient will be in town and possibly at [facility] for surgery with Dr. Nishant slater.  She will reach out to this [facility] nurse if outpatient visit is warranted or if patient is inpatient I can visit and check on things.  They denied wanting to set up an appointment as outpatient as they did not know their schedule in the coming weeks due to possible surgery.        Time spent teleconferencing with patient's spouse was: 30 minutes

## 2024-04-11 ENCOUNTER — APPOINTMENT (OUTPATIENT)
Dept: ULTRASOUND IMAGING | Facility: HOSPITAL | Age: 79
DRG: 305 | End: 2024-04-11
Payer: MEDICARE

## 2024-04-11 PROBLEM — I16.0 HYPERTENSIVE URGENCY: Status: ACTIVE | Noted: 2024-04-11

## 2024-04-11 LAB
ANION GAP SERPL CALCULATED.3IONS-SCNC: 14 MMOL/L (ref 5–15)
BASOPHILS # BLD AUTO: 0.03 10*3/MM3 (ref 0–0.2)
BASOPHILS NFR BLD AUTO: 0.8 % (ref 0–1.5)
BUN SERPL-MCNC: 17 MG/DL (ref 8–23)
BUN/CREAT SERPL: 13.3 (ref 7–25)
CALCIUM SPEC-SCNC: 8.9 MG/DL (ref 8.6–10.5)
CHLORIDE SERPL-SCNC: 102 MMOL/L (ref 98–107)
CO2 SERPL-SCNC: 24 MMOL/L (ref 22–29)
CREAT SERPL-MCNC: 1.28 MG/DL (ref 0.76–1.27)
CREAT UR-MCNC: 51.7 MG/DL
DEPRECATED RDW RBC AUTO: 48 FL (ref 37–54)
EGFRCR SERPLBLD CKD-EPI 2021: 57.6 ML/MIN/1.73
EOSINOPHIL # BLD AUTO: 0.04 10*3/MM3 (ref 0–0.4)
EOSINOPHIL NFR BLD AUTO: 1 % (ref 0.3–6.2)
ERYTHROCYTE [DISTWIDTH] IN BLOOD BY AUTOMATED COUNT: 13.3 % (ref 12.3–15.4)
GLUCOSE SERPL-MCNC: 85 MG/DL (ref 65–99)
HCT VFR BLD AUTO: 42.2 % (ref 37.5–51)
HGB BLD-MCNC: 14.5 G/DL (ref 13–17.7)
IMM GRANULOCYTES # BLD AUTO: 0.03 10*3/MM3 (ref 0–0.05)
IMM GRANULOCYTES NFR BLD AUTO: 0.8 % (ref 0–0.5)
LYMPHOCYTES # BLD AUTO: 0.33 10*3/MM3 (ref 0.7–3.1)
LYMPHOCYTES NFR BLD AUTO: 8.6 % (ref 19.6–45.3)
MCH RBC QN AUTO: 33.7 PG (ref 26.6–33)
MCHC RBC AUTO-ENTMCNC: 34.4 G/DL (ref 31.5–35.7)
MCV RBC AUTO: 98.1 FL (ref 79–97)
MONOCYTES # BLD AUTO: 0.36 10*3/MM3 (ref 0.1–0.9)
MONOCYTES NFR BLD AUTO: 9.4 % (ref 5–12)
NEUTROPHILS NFR BLD AUTO: 3.06 10*3/MM3 (ref 1.7–7)
NEUTROPHILS NFR BLD AUTO: 79.4 % (ref 42.7–76)
NRBC BLD AUTO-RTO: 0 /100 WBC (ref 0–0.2)
PLATELET # BLD AUTO: 161 10*3/MM3 (ref 140–450)
PMV BLD AUTO: 10.2 FL (ref 6–12)
POTASSIUM SERPL-SCNC: 4.2 MMOL/L (ref 3.5–5.2)
PSA SERPL-MCNC: 4.49 NG/ML (ref 0–4)
RBC # BLD AUTO: 4.3 10*6/MM3 (ref 4.14–5.8)
SODIUM SERPL-SCNC: 140 MMOL/L (ref 136–145)
TSH SERPL DL<=0.05 MIU/L-ACNC: 1.85 UIU/ML (ref 0.27–4.2)
UUN 24H UR-MCNC: 285 MG/DL
WBC NRBC COR # BLD AUTO: 3.85 10*3/MM3 (ref 3.4–10.8)

## 2024-04-11 PROCEDURE — 84443 ASSAY THYROID STIM HORMONE: CPT | Performed by: NURSE PRACTITIONER

## 2024-04-11 PROCEDURE — 76775 US EXAM ABDO BACK WALL LIM: CPT

## 2024-04-11 PROCEDURE — 25010000002 HEPARIN (PORCINE) PER 1000 UNITS: Performed by: NURSE PRACTITIONER

## 2024-04-11 PROCEDURE — 99222 1ST HOSP IP/OBS MODERATE 55: CPT | Performed by: INTERNAL MEDICINE

## 2024-04-11 PROCEDURE — 99233 SBSQ HOSP IP/OBS HIGH 50: CPT | Performed by: HOSPITALIST

## 2024-04-11 PROCEDURE — 97165 OT EVAL LOW COMPLEX 30 MIN: CPT

## 2024-04-11 PROCEDURE — 97535 SELF CARE MNGMENT TRAINING: CPT

## 2024-04-11 PROCEDURE — 80048 BASIC METABOLIC PNL TOTAL CA: CPT | Performed by: NURSE PRACTITIONER

## 2024-04-11 PROCEDURE — 85025 COMPLETE CBC W/AUTO DIFF WBC: CPT | Performed by: NURSE PRACTITIONER

## 2024-04-11 PROCEDURE — 97162 PT EVAL MOD COMPLEX 30 MIN: CPT

## 2024-04-11 RX ORDER — AMLODIPINE BESYLATE 5 MG/1
5 TABLET ORAL
Status: DISCONTINUED | OUTPATIENT
Start: 2024-04-11 | End: 2024-04-12 | Stop reason: HOSPADM

## 2024-04-11 RX ORDER — SPIRONOLACTONE 25 MG/1
12.5 TABLET ORAL DAILY
Status: DISCONTINUED | OUTPATIENT
Start: 2024-04-11 | End: 2024-04-12 | Stop reason: HOSPADM

## 2024-04-11 RX ORDER — CLONIDINE HYDROCHLORIDE 0.1 MG/1
0.1 TABLET ORAL
Status: DISCONTINUED | OUTPATIENT
Start: 2024-04-11 | End: 2024-04-12 | Stop reason: HOSPADM

## 2024-04-11 RX ADMIN — Medication 10 ML: at 21:18

## 2024-04-11 RX ADMIN — PANTOPRAZOLE SODIUM 40 MG: 40 TABLET, DELAYED RELEASE ORAL at 06:17

## 2024-04-11 RX ADMIN — HYDROXYZINE HYDROCHLORIDE 10 MG: 10 TABLET ORAL at 23:09

## 2024-04-11 RX ADMIN — GABAPENTIN 300 MG: 300 CAPSULE ORAL at 15:11

## 2024-04-11 RX ADMIN — CLONIDINE HYDROCHLORIDE 0.1 MG: 0.1 TABLET ORAL at 22:54

## 2024-04-11 RX ADMIN — SPIRONOLACTONE 12.5 MG: 25 TABLET ORAL at 10:27

## 2024-04-11 RX ADMIN — GABAPENTIN 300 MG: 300 CAPSULE ORAL at 06:17

## 2024-04-11 RX ADMIN — LATANOPROST 1 DROP: 50 SOLUTION OPHTHALMIC at 21:18

## 2024-04-11 RX ADMIN — ASPIRIN 81 MG: 81 TABLET, COATED ORAL at 09:47

## 2024-04-11 RX ADMIN — Medication 10 ML: at 09:48

## 2024-04-11 RX ADMIN — Medication 1 TABLET: at 09:47

## 2024-04-11 RX ADMIN — TAMSULOSIN HYDROCHLORIDE 0.4 MG: 0.4 CAPSULE ORAL at 09:47

## 2024-04-11 RX ADMIN — BUDESONIDE 6 MG: 3 CAPSULE, COATED PELLETS ORAL at 09:47

## 2024-04-11 RX ADMIN — GABAPENTIN 300 MG: 300 CAPSULE ORAL at 21:18

## 2024-04-11 RX ADMIN — AMLODIPINE BESYLATE 5 MG: 5 TABLET ORAL at 10:27

## 2024-04-11 RX ADMIN — MAGNESIUM OXIDE TAB 400 MG (241.3 MG ELEMENTAL MG) 400 MG: 400 (241.3 MG) TAB at 09:47

## 2024-04-11 RX ADMIN — SERTRALINE HYDROCHLORIDE 50 MG: 50 TABLET ORAL at 09:47

## 2024-04-11 RX ADMIN — HEPARIN SODIUM 5000 UNITS: 5000 INJECTION INTRAVENOUS; SUBCUTANEOUS at 09:47

## 2024-04-11 RX ADMIN — FINASTERIDE 5 MG: 5 TABLET, FILM COATED ORAL at 09:47

## 2024-04-11 RX ADMIN — OXYCODONE HYDROCHLORIDE AND ACETAMINOPHEN 500 MG: 500 TABLET ORAL at 09:47

## 2024-04-11 NOTE — CONSULTS
[facility]  Cardiovascular Consultation Note    Reason for consultation: Heart failure with preserved EF #2 minimally elevated high-sensitivity troponin #3 permanent atrial fibrillation    History of Present Illness:  78-year-old male patient with history of atrial fibrillation with prior cardioversion.  However he had permanent atrial fibrillation for a while and is on rate control therapy and had a Watchman device placed.  The patient has had prior cardiac cath many years ago.  He has had stress test.  He had a stress test last month i.e. a stress PET which was negative for ischemia and minimal coronary calcification.  He also has a history of Crohn's disease, possible prostate cancer with an elevated PSA, retroperitoneal mass, lymphadenopathy worrisome for metastatic cancer.  Patient also has hyperlipidemia which has been difficult to treat because of side effects from medication.  He also has a lumbar disc extrusion causing significant symptoms.  Patient comes in complaining of shortness of breath, weakness.  The patient wife stated patient began to develop problems around January.  The patient's wife did not notice him being short of breath.  On the day of admission he felt very smothering laying back.  His wife asked him what was wrong and he said he was short of breath.  He states he been short of breath for about a week.  He is not on any diuretics.  She states that his blood pressure runs in the 150s at home.  But he does not check it every day.  He denies the use of NSAIDs.  He continues to get discomfort in his chest when he first gets up in the morning assessed on the side of the bed.  It lasts 1 minute.  Does not occur with exertion is not worse with activity.  He denies palpitations.  No edema.  Has diffuse arthritic pains in multiple joints he recently had a diagnosis of gout was treated with prednisone but that was a month ago.  States has been compliant with his  medications.    Cardiac risk factors: Male sex #2 age greater than 55 #3 hypertension #4 hyperlipidemia #5 former smoker    Past medical and surgical history, social and family history reviewed in EMR.    REVIEW OF SYSTEMS:     Past Medical History:   Diagnosis Date    Allergic rhinitis 11/03/2016    Intermittent    Atrial fibrillation     Chronic anxiety 11/03/2016    Chronic intermittent anxiety/remote depression.     Chronic insomnia 11/03/2016    Colostomy in place     Crohn's disease 11/03/2016    Crohn’s disease with partial colectomy and colostomy - data deficit.     DVT (deep venous thrombosis)     Remote left calf DVT/Coumadin therapy - data deficit.    Dyslipidemia 11/03/2016    Moderate    Enlarged prostate     Glaucoma     Hiatal hernia 11/03/2016    probable GERD, April 2009.    Hyperlipidemia     Hypertensive cardiovascular disease 11/03/2016    Probable hypertensive cardiovascular disease: Remote progressive exertional dyspnea/chest pain syndrome with acceptable GXT and borderline abnormal acceptable Holter monitor, April 2009. Recurrent progressive NYHA class III symptoms with abnormal EKG and acceptable chest x-ray with essentially normal right dominant coronary artery and preserved systolic LV dysfunction, LVEF (0.66) with continued m    Labile hypertension 11/03/2016    Myalgia due to statin 01/03/2024    Obstructive sleep apnea on CPAP 11/03/2016    Obstructive sleep apnea with intermittent CPAP therapy (did not tolerate).  --PATIENT STATES THE DOES NOT USE MACHINE ALL THE TIME    Vitamin deficiency     Chronic vitamin deficiency secondary to Crohn’s disease.       Past Surgical History:   Procedure Laterality Date    ATRIAL APPENDAGE EXCLUSION LEFT WITH TRANSESOPHAGEAL ECHOCARDIOGRAM N/A 11/13/2023    Procedure: Atrial Appendage Occlusion;  Surgeon: Tevin Esparza MD;  Location: [facility] INVASIVE LOCATION;  Service: Cardiology;  Laterality: N/A;    CARDIOVERSION      1/16/2023 PER DR. VICKERS  "   COLECTOMY PARTIAL / TOTAL      with colostomy    HAND SURGERY      secondary to trauma     Social History     Socioeconomic History    Marital status:    Tobacco Use    Smoking status: Former     Current packs/day: 0.00     Types: Cigarettes     Quit date:      Years since quittin.3     Passive exposure: Never    Smokeless tobacco: Never   Vaping Use    Vaping status: Never Used   Substance and Sexual Activity    Alcohol use: No    Drug use: No    Sexual activity: Defer     Family History   Problem Relation Age of Onset    No Known Problems Mother     No Known Problems Father        H&P ROS reviewed and pertinent CV ROS as noted in HPI.    Cardiac: Patient has known permanent atrial fibrillation treated with beta-blocker.  He has had a prior Watchman device therefore not necessitating anticoagulation.  Had a negative nuclear stress PET in 2024  Respiratory: Patient complains of dyspnea on exertion and orthopnea  Lower Extremities: No edema.  Complains of fasciculations and diffuse muscle aches  GI: Has decreased appetite.  No nausea or vomiting.  No blood in the stool  Neuro: No history of stroke TIA or neurologic event/he has an extruded disc  Hematology: No bleeding diathesis ecchymosis or petechiae  Renal: No known CKD.  Has a prostatic mass and an elevated PSA  Musculoskeletal: Complains of hip pain  Endocrine: No hypothyroidism  Constitutional: Has had anorexia  Psych: No suicidal ideation      Physical Exam: General pleasant elderly male sitting in bed current systolic blood pressure 154/112.  Heart rate 88.  Not dyspneic nontachypneic       HEENT: No obvious JVP or bruits.  No icterus       Respiratory: Equal bilateral symmetrical expansion\" bilaterally       Cardiovascular: Irregular rate and rhythm without any obvious murmur and no edema to palpation.  He has a 2+ right common femoral artery pulse 1+ on the left.  He has palpable pulses in the right foot decreased pulses " left foot       GI: Soft positive bowel sounds nontender       Lower Extremities: No open lesions       Neuro: Facial expressions are symmetrical moves all 4 extremities       Skin: Warm and dry no edema palpation       Psych: Pleasant affect oriented x 3    Results Review: The BNP is elevated at 9182.  High-sensitivity troponins were 25 yesterday down to 22.  GFR is 57 today but was less yesterday.  PSA is elevated 4.4.  CTA is negative for pulmonary embolism.  There is a retroperitoneal mass.  CHADS2 Vascor is 3.  EKG shows A-fib controlled ventricular response           Vital Sign Min/Max for last 24 hours  Temp  Min: 97 °F (36.1 °C)  Max: 98.4 °F (36.9 °C)   BP  Min: 100/63  Max: 198/140   Pulse  Min: 76  Max: 107   Resp  Min: 17  Max: 20   SpO2  Min: 87 %  Max: 99 %   No data recorded    No intake or output data in the 24 hours ending 04/11/24 0942          Current Facility-Administered Medications:     acetaminophen (TYLENOL) tablet 650 mg, 650 mg, Oral, Q4H PRN **OR** acetaminophen (TYLENOL) 160 MG/5ML oral solution 650 mg, 650 mg, Oral, Q4H PRN **OR** acetaminophen (TYLENOL) suppository 650 mg, 650 mg, Rectal, Q4H PRN, Kamla Killian APRN    ascorbic acid (VITAMIN C) tablet 500 mg, 500 mg, Oral, Daily, Kamla Killian APRN    aspirin EC tablet 81 mg, 81 mg, Oral, Daily, Kamla Killian APRN    sennosides-docusate (PERICOLACE) 8.6-50 MG per tablet 2 tablet, 2 tablet, Oral, BID PRN **AND** polyethylene glycol (MIRALAX) packet 17 g, 17 g, Oral, Daily PRN **AND** bisacodyl (DULCOLAX) EC tablet 5 mg, 5 mg, Oral, Daily PRN **AND** bisacodyl (DULCOLAX) suppository 10 mg, 10 mg, Rectal, Daily PRN, Kamla Killian, APRN    Budesonide (ENTOCORT EC) 24 hr capsule 6 mg, 6 mg, Oral, Daily, Kamla Killian APRN    finasteride (PROSCAR) tablet 5 mg, 5 mg, Oral, Daily, Kamla Killian APRN    gabapentin (NEURONTIN) capsule 300 mg, 300 mg, Oral, Q8H, Kamla Killian, ROSALBA, 300 mg at  04/11/24 0617    heparin (porcine) 5000 UNIT/ML injection 5,000 Units, 5,000 Units, Subcutaneous, Q12H, Kamla Killian APRN    HYDROcodone-acetaminophen (NORCO) 5-325 MG per tablet 1 tablet, 1 tablet, Oral, Q4H PRN, Van, Flora, APRN    hydrOXYzine (ATARAX) tablet 10 mg, 10 mg, Oral, Once, Van, Flora, APRN    latanoprost (XALATAN) 0.005 % ophthalmic solution 1 drop, 1 drop, Both Eyes, Nightly, Kamla Killian APRN    magnesium oxide (MAG-OX) tablet 400 mg, 400 mg, Oral, Daily, Kamla Killian APRN    melatonin tablet 5 mg, 5 mg, Oral, Nightly PRN, Kamla Killian APRN    multivitamin with minerals 1 tablet, 1 tablet, Oral, Daily, Kamla Killian APRN    nebivolol (BYSTOLIC) tablet 5 mg, 5 mg, Oral, Daily, Kamla Killian APRN    niCARdipine (CARDENE) 20 mg in 200 mL NS infusion, 5-15 mg/hr, Intravenous, Titrated, Kamla Killian APRN, Stopped at 04/10/24 2333    nitroglycerin (NITROSTAT) SL tablet 0.4 mg, 0.4 mg, Sublingual, Q5 Min PRN, Kamla Killian APRN    ondansetron ODT (ZOFRAN-ODT) disintegrating tablet 4 mg, 4 mg, Oral, Q6H PRN **OR** ondansetron (ZOFRAN) injection 4 mg, 4 mg, Intravenous, Q6H PRN, Kamla Killian APRN    pantoprazole (PROTONIX) EC tablet 40 mg, 40 mg, Oral, Daily, Kamla Killian APRN, 40 mg at 04/11/24 0617    sertraline (ZOLOFT) tablet 50 mg, 50 mg, Oral, Daily, Kamla Killian APRN    sodium chloride 0.9 % flush 10 mL, 10 mL, Intravenous, PRN, Killian, Kamla W, APRN    sodium chloride 0.9 % flush 10 mL, 10 mL, Intravenous, Q12H, Kamla Killian APRN, 10 mL at 04/10/24 7675    sodium chloride 0.9 % flush 10 mL, 10 mL, Intravenous, PRN, Kamla Killian, ROSALBA    sodium chloride 0.9 % infusion 40 mL, 40 mL, Intravenous, PRN, Kamla Killian, ROSALBA    tamsulosin (FLOMAX) 24 hr capsule 0.4 mg, 0.4 mg, Oral, Daily, Kamla Killian APRN    Lab Review:   Results from last 7 days   Lab Units  04/11/24  0613 04/10/24  1346   WBC 10*3/mm3 3.85 5.42   HEMOGLOBIN g/dL 14.5 15.8   PLATELETS 10*3/mm3 161 212     Results from last 7 days   Lab Units 04/11/24  0613 04/10/24  1346   SODIUM mmol/L 140 139   POTASSIUM mmol/L 4.2 4.1   CO2 mmol/L 24.0 28.0   BUN mg/dL 17 19   CREATININE mg/dL 1.28* 1.43*   GLUCOSE mg/dL 85 97     Estimated Creatinine Clearance: 58.8 mL/min (A) (by C-G formula based on SCr of 1.28 mg/dL (H)).        .lipd  Lab Results   Component Value Date    AST 26 04/10/2024    ALT 16 04/10/2024       Radiology Reports:  Imaging Results (Last 72 Hours)       Procedure Component Value Units Date/Time    CT Abdomen Pelvis With Contrast [[id number]] Collected: 04/10/24 1853     Updated: 04/10/24 1912    Narrative:      CT ABDOMEN PELVIS W CONTRAST    Date of Exam: 4/10/2024 6:16 PM EDT    Indication: nausea.    Comparison: None available.    Technique: Axial CT images were obtained of the abdomen and pelvis following the uneventful intravenous administration of 80 mL of Isovue-370 intravenous contrast. Reconstructed coronal and sagittal images were also obtained. Automated exposure control   and iterative construction methods were used.      Findings:  Lung Bases:    Mild bibasilar atelectasis or scarring and trace pleural effusions      Liver:Liver is normal in size and CT density. No focal lesions.      Biliary/Gallbladder: The gallbladder is without evidence of radiopaque stones. The biliary tree is nondilated.      Spleen:Spleen is normal in size and CT density.    Pancreas:   Pancreas shows homogeneous density. There is no evidence of pancreatic mass or peripancreatic fluid.      Kidneys: Kidneys are normal in size. Nonobstructive right renal stone. There is no hydronephrosis. There are few simple appearing left renal cysts. Small parapelvic right renal cyst      Adrenals: Adrenal glands are unremarkable.      Retroperitoneal/Lymph Nodes/Vasculature: There is a 4.8 cm pathologically enlarged left  para-aortic lymph node. Additional subcentimeter retroperitoneal lymph nodes are seen      Gastrointestinal/Mesentery: The stomach and small bowel loops are normal in caliber. Surgical changes are seen status post left hemicolectomy with a left lower quadrant colostomy.    Mildly prominent base of the prostate with a small nodule measuring 1.2 cm versus small mass at the base of the bladder. Diffuse bladder wall thickening with trabeculated bladder wall      Bony Structures:  Visualized bony structures are consistent with the patient's age.          Impression:      Impression:    1. Left hemicolectomy changes with a left lower quadrant colostomy    2. Pathologically enlarged left para-aortic lymph node compatible with metastatic disease in this clinical setting    3. Mild basilar atelectasis and trace pleural effusions    4. Small nodule at the base of the prostate versus base of the bladder and diffusely trabeculated bladder wall. Correlation with a bladder ultrasound recommended        Electronically Signed: Yovany Wilks MD    4/10/2024 7:09 PM EDT    Workstation ID: [id number]    CT Angiogram Chest Pulmonary Embolism [[id number]] Collected: 04/10/24 1631     Updated: 04/10/24 1645    Narrative:      CT ANGIOGRAM CHEST PULMONARY EMBOLISM    Date of Exam: 4/10/2024 4:07 PM EDT    Indication: h/o afib s/p watchmen procedure soa.    Comparison: None available.    Technique: Axial CT images were obtained of the chest after the uneventful intravenous administration of Isovue-370, 85 mL utilizing pulmonary embolism protocol.  Reconstructed coronal and sagittal images were also obtained. Automated exposure control   and iterative construction methods were used.      Findings:    Pulmonary arteries: Adequate opacification of the pulmonary arteries. No evidence of acute pulmonary embolism.    Thoracic aorta: The thoracic aorta is not opacified with contrast due to bolus timing. Thoracic aorta is normal in caliber and  contour. Small scattered atheromatous calcifications are visualized.    Thyroid: The visualized portion of the thyroid is unremarkable.    Lungs and Pleura: Mild bilateral dependent atelectasis visualized. Small right pleural effusion is visualized. No suspicious pulmonary nodules. No pneumothorax.    Mediastinum/Leigha: No mediastinal or hilar lymphadenopathy.    Lymph nodes: No axillary or supraclavicular lymphadenopathy.    Cardiovascular: The heart is normal in size.No evidence of pericardial effusion. Patient is post watchman procedure. Small coronary calcifications are visualized.    Upper Abdomen: There is a left retroperitoneal soft tissue mass abutting the abdominal aorta measuring 3.6 x 4.8 cm. Evaluation is incomplete on this chest CTA however, lesion may have slightly increased in size when compared to prior CT performed on   September 24, 2019.    Bones and Soft Tissue: No acute fracture, aggressive osseous lesions, or soft tissue process.        Impression:      Impression:  No evidence of pulmonary embolism.    Post watchman procedure.    Small right pleural effusion.    Again visualized is a left retroperitoneal soft tissue mass measuring 3.6 x 4.8 cm. Evaluation is incomplete on this chest CTA however, lesion may have slightly increased in size when compared to prior abdominal CT performed on September 24, 2019   (previously measured 3.2 x 4.2 cm). If not already performed, consider evaluation by means of percutaneous tissue sampling as clinically warranted.        Electronically Signed: Prem Kevin MD    4/10/2024 4:41 PM EDT    Workstation ID: [id number]    XR Chest 1 View [[id number]] Collected: 04/10/24 1344     Updated: 04/10/24 1349    Narrative:      XR CHEST 1 VW    Date of Exam: 4/10/2024 1:29 PM EDT    Indication: SOA triage protocol    Comparison: 6/12/2010    Findings:  Heart shadow is in the range of normal size. Pulmonary vasculature appears upper limits of normal. There appears to be a  left atrial appendage exclusion device in place. There is a suggestion of a small right pleural effusion, however, blunting of the   lateral costophrenic angle is also present in 2010 and this may represent scarring instead. No new pulmonary parenchymal disease is seen. There is no evidence of pneumothorax.      Impression:      Impression:    1. Heart shadow in the upper range of normal size. Mild pulmonary venous hypertension. No evidence of pulmonary edema.    2. Blunting of the right lateral costophrenic angle, but unchanged from 2010. Consider small recurrent pleural effusion, or chronic and stable mild right basilar pleural scarring.    3. No other evidence of active chest pathology.      Electronically Signed: Navdeep Rodriguez MD    4/10/2024 1:46 PM EDT    Workstation ID: [id number]            Assessment/Plan: 1 this patient presents with shortness of breath especially dyspnea on exertion orthopnea.  Blood pressure was markedly elevated on admission and his BNP significantly elevated.  Patient's blood pressure at home runs in the 150s.  They do not check it very often so it has probably been higher than that for a while.  At this time we will give him 1 dose of Lasix.  Will start spironolactone and amlodipine 5 mg daily for blood pressure.  Wheeze.  Clonidine as well  2 minimally elevated high-sensitivity troponin possibly secondary to elevated blood pressure.  He had a negative nuclear stress test with minimal coronary calcification March 6, 2024.  Do not see any need for further ischemic evaluation  3 permanent atrial fibrillation will monitor during hospitalization for adequacy of rate control      Fredrick Nance MD  04/11/24  09:42 EDT

## 2024-04-11 NOTE — ED PROVIDER NOTES
Subjective  History of Present Illness:    Chief Complaint: Shortness of breath, weakness  History of Present Illness: 78-year-old male presents with shortness of breath, weakness, has not felt well for several weeks, he states that shortness of breath is worse when he walks, he also reports that he has a history of A-fib, recently had a Watchman procedure, he states that after.  The procedure, there were changes made to his medication, he was initially on aspirin and Plavix, and Lipitor however he was told that the Plavix and Lipitor cannot be taken together, so he was switched to Crestor.  He and his wife both believe that a lot of issues began after changing to Crestor, including muscle aches, body aches, restless legs, and difficulty with sleeping.   Onset: Gradual onset  Duration: Symptoms have been getting worse over the last several weeks  Exacerbating / Alleviating factors: Shortness of breath is worse with any exertion  Associated symptoms: Weakness      Nurses Notes reviewed and agree, including vitals, allergies, social history and prior medical history.     REVIEW OF SYSTEMS: All systems reviewed and not pertinent unless noted.    Review of Systems   Constitutional:  Positive for fatigue.   Respiratory:  Positive for shortness of breath.    Gastrointestinal:  Positive for nausea.   Neurological:  Positive for weakness.   All other systems reviewed and are negative.      Past Medical History:   Diagnosis Date    Allergic rhinitis 11/03/2016    Intermittent    Atrial fibrillation     Chronic anxiety 11/03/2016    Chronic intermittent anxiety/remote depression.     Chronic insomnia 11/03/2016    Colostomy in place     Crohn's disease 11/03/2016    Crohn’s disease with partial colectomy and colostomy - data deficit.     DVT (deep venous thrombosis)     Remote left calf DVT/Coumadin therapy - data deficit.    Dyslipidemia 11/03/2016    Moderate    Enlarged prostate     Glaucoma     Hiatal hernia 11/03/2016     probable GERD, 2009.    Hyperlipidemia     Hypertensive cardiovascular disease 2016    Probable hypertensive cardiovascular disease: Remote progressive exertional dyspnea/chest pain syndrome with acceptable GXT and borderline abnormal acceptable Holter monitor, 2009. Recurrent progressive NYHA class III symptoms with abnormal EKG and acceptable chest x-ray with essentially normal right dominant coronary artery and preserved systolic LV dysfunction, LVEF (0.66) with continued m    Labile hypertension 2016    Myalgia due to statin 2024    Obstructive sleep apnea on CPAP 2016    Obstructive sleep apnea with intermittent CPAP therapy (did not tolerate).  --PATIENT STATES THE DOES NOT USE MACHINE ALL THE TIME    Vitamin deficiency     Chronic vitamin deficiency secondary to Crohn’s disease.         Allergies:    Crestor [rosuvastatin] and Imuran [azathioprine]      Past Surgical History:   Procedure Laterality Date    ATRIAL APPENDAGE EXCLUSION LEFT WITH TRANSESOPHAGEAL ECHOCARDIOGRAM N/A 2023    Procedure: Atrial Appendage Occlusion;  Surgeon: Tevin Esparza MD;  Location: [facility] INVASIVE LOCATION;  Service: Cardiology;  Laterality: N/A;    CARDIOVERSION      2023 PER DR. VICKERS    COLECTOMY PARTIAL / TOTAL      with colostomy    HAND SURGERY      secondary to trauma         Social History     Socioeconomic History    Marital status:    Tobacco Use    Smoking status: Former     Current packs/day: 0.00     Types: Cigarettes     Quit date:      Years since quittin.3     Passive exposure: Never    Smokeless tobacco: Never   Vaping Use    Vaping status: Never Used   Substance and Sexual Activity    Alcohol use: No    Drug use: No    Sexual activity: Defer         Family History   Problem Relation Age of Onset    No Known Problems Mother     No Known Problems Father        Objective  Physical Exam:  BP (!) 145/106   Pulse 105   Temp 97.9 °F (36.6  "°C) (Oral)   Resp 17   Ht 177.8 cm (70\")   Wt 88.9 kg (196 lb)   SpO2 93%   BMI 28.12 kg/m²      Physical Exam  Vitals and nursing note reviewed.   Constitutional:       Appearance: He is well-developed.   HENT:      Head: Normocephalic and atraumatic.      Mouth/Throat:      Mouth: Mucous membranes are moist.   Eyes:      Extraocular Movements: Extraocular movements intact.   Cardiovascular:      Rate and Rhythm: Normal rate and regular rhythm.   Pulmonary:      Effort: Pulmonary effort is normal.      Breath sounds: Normal breath sounds. No decreased breath sounds or wheezing.   Abdominal:      Palpations: Abdomen is soft. There is no mass.      Tenderness: There is no guarding.   Musculoskeletal:         General: Normal range of motion.      Cervical back: Normal range of motion.   Skin:     General: Skin is warm and dry.   Neurological:      Mental Status: He is alert and oriented to person, place, and time.   Psychiatric:         Behavior: Behavior normal.         Thought Content: Thought content normal.         Judgment: Judgment normal.           Critical Care    Performed by: Aristeo Mcgowan Jr., PA-C  Authorized by: Alofnso Nieto MD    Critical care provider statement:     Critical care time (minutes):  30    Critical care time was exclusive of:  Separately billable procedures and treating other patients and teaching time    Critical care was necessary to treat or prevent imminent or life-threatening deterioration of the following conditions: Hypertensive urgency requiring multiple IV antihypertensives, and an antihypertensive Cardene drip.  Patient admitted to the hospital for further care.    Critical care was time spent personally by me on the following activities:  Blood draw for specimens, development of treatment plan with patient or surrogate, discussions with consultants, discussions with primary provider, evaluation of patient's response to treatment, examination of patient, " interpretation of cardiac output measurements, obtaining history from patient or surrogate, ordering and performing treatments and interventions, ordering and review of laboratory studies, ordering and review of radiographic studies, pulse oximetry, re-evaluation of patient's condition and review of old charts    Care discussed with: admitting provider        ED Course:    ED Course as of 04/10/24 2214   Wed Apr 10, 2024   1354 Chest ray interpretation by me: No acute cardiopulmonary abnormality [CS]   1356 Review of previous  non ED visits, prior labs, prior imaging, available notes from prior evaluations or visits with specialists, medication list, allergies, past medical history, past surgical history     [CS]   1413 BP(!): 174/104 [CS]   1413 Temp: 97.9 °F (36.6 °C) [CS]   1413 Heart Rate: 93 [CS]   1413 SpO2: 94 %  Hydralazine, 10 mg IV given for hypertension [CS]   1433 Echo performed January 3, 2024:  Interpretation Summary       ·  27 mm Watchman FLX atrial appendage occlusion device in place.  There is a small jet of periprosthetic flow measuring proximately 2-3 mm.  This is consistent with adequate left atrial appendage occlusion.  ·  Left ventricular ejection fraction appears to be 51 - 55%.  ·  Mild to moderate mitral valve regurgitation is present.  ·  There is severe, (grade 4) plaque in the aortic arch present.      [CS]   1434 proBNP(!): 9,182.0 [CS]   1434 Creatinine(!): 1.43 [CS]   1434 HS Troponin T(!): 25 [CS]   1712 His pressure continues to be elevated, I added an IV dose of labetalol [CS]   1806 Discussed results with patient family at the bedside, discussed also with my supervising physician Dr. Nieto, his blood pressure is improving, he is not requiring any oxygen, reviewed his stress test, no ischemia, and normal LV function.  Patient continues to complain of nausea, will send him back over for CT scan of his abdomen pelvis to assess this further [CS]   1814 Patient requesting more nausea  medicine, based on his continued symptoms, added a CT scan abdomen pelvis [CS]   1920 Will ambulate the patient to see what his oxygen level does [CS]   1959 Patient's oxygen saturation stayed high with ambulation but his heart rate went up to 110 and he is very symptomatic after walking down the [place] and back very dyspneic [CS]   2049 Discussed the case with the on-call hospitalist, she excepted for admission [CS]   2049 Cardene drip initiated, for continued elevation in blood pressure despite 2 doses of IV antihypertensives [CS]      ED Course User Index  [CS] Aristeo Mcgowan Jr., PA-C       Lab Results (last 24 hours)       Procedure Component Value Units Date/Time    CBC & Differential [[id number]]  (Abnormal) Collected: 04/10/24 1346    Specimen: Blood Updated: 04/10/24 1401    Narrative:      The following orders were created for panel order CBC & Differential.  Procedure                               Abnormality         Status                     ---------                               -----------         ------                     CBC Auto Differential[[id number]]        Abnormal            Final result                 Please view results for these tests on the individual orders.    Comprehensive Metabolic Panel [[id number]]  (Abnormal) Collected: 04/10/24 1346    Specimen: Blood Updated: 04/10/24 1427     Glucose 97 mg/dL      BUN 19 mg/dL      Creatinine 1.43 mg/dL      Sodium 139 mmol/L      Potassium 4.1 mmol/L      Chloride 100 mmol/L      CO2 28.0 mmol/L      Calcium 9.5 mg/dL      Total Protein 7.3 g/dL      Albumin 4.3 g/dL      ALT (SGPT) 16 U/L      AST (SGOT) 26 U/L      Alkaline Phosphatase 88 U/L      Total Bilirubin 0.8 mg/dL      Globulin 3.0 gm/dL      Comment: Calculated Result        A/G Ratio 1.4 g/dL      BUN/Creatinine Ratio 13.3     Anion Gap 11.0 mmol/L      eGFR 50.5 mL/min/1.73     Narrative:      GFR Normal >60  Chronic Kidney Disease <60  Kidney Failure <15    The GFR formula is  only valid for adults with stable renal function between ages 18 and 70.    BNP [[id number]]  (Abnormal) Collected: 04/10/24 1346    Specimen: Blood Updated: 04/10/24 1427     proBNP 9,182.0 pg/mL     Narrative:      This assay is used as an aid in the diagnosis of individuals suspected of having heart failure. It can be used as an aid in the diagnosis of acute decompensated heart failure (ADHF) in patients presenting with signs and symptoms of ADHF to the emergency department (ED). In addition, NT-proBNP of <300 pg/mL indicates ADHF is not likely.    Age Range Result Interpretation  NT-proBNP Concentration (pg/mL:      <50             Positive            >450                   Gray                 300-450                    Negative             <300    50-75           Positive            >900                  Gray                300-900                  Negative            <300      >75             Positive            >1800                  Gray                300-1800                  Negative            <300    Single High Sensitivity Troponin T [[id number]]  (Abnormal) Collected: 04/10/24 1346    Specimen: Blood Updated: 04/10/24 1427     HS Troponin T 25 ng/L     Narrative:      High Sensitive Troponin T Reference Range:  <14.0 ng/L- Negative Female for AMI  <22.0 ng/L- Negative Male for AMI  >=14 - Abnormal Female indicating possible myocardial injury.  >=22 - Abnormal Male indicating possible myocardial injury.   Clinicians would have to utilize clinical acumen, EKG, Troponin, and serial changes to determine if it is an Acute Myocardial Infarction or myocardial injury due to an underlying chronic condition.         CBC Auto Differential [[id number]]  (Abnormal) Collected: 04/10/24 1346    Specimen: Blood Updated: 04/10/24 1401     WBC 5.42 10*3/mm3      RBC 4.79 10*6/mm3      Hemoglobin 15.8 g/dL      Hematocrit 46.2 %      MCV 96.5 fL      MCH 33.0 pg      MCHC 34.2 g/dL      RDW 13.2 %      RDW-SD 47.2 fl       MPV 9.5 fL      Platelets 212 10*3/mm3      Neutrophil % 76.8 %      Lymphocyte % 11.6 %      Monocyte % 9.2 %      Eosinophil % 1.1 %      Basophil % 0.6 %      Immature Grans % 0.7 %      Neutrophils, Absolute 4.16 10*3/mm3      Lymphocytes, Absolute 0.63 10*3/mm3      Monocytes, Absolute 0.50 10*3/mm3      Eosinophils, Absolute 0.06 10*3/mm3      Basophils, Absolute 0.03 10*3/mm3      Immature Grans, Absolute 0.04 10*3/mm3      nRBC 0.0 /100 WBC     Single High Sensitivity Troponin T [[id number]]  (Abnormal) Collected: 04/10/24 1702    Specimen: Blood Updated: 04/10/24 1740     HS Troponin T 22 ng/L     Narrative:      High Sensitive Troponin T Reference Range:  <14.0 ng/L- Negative Female for AMI  <22.0 ng/L- Negative Male for AMI  >=14 - Abnormal Female indicating possible myocardial injury.  >=22 - Abnormal Male indicating possible myocardial injury.   Clinicians would have to utilize clinical acumen, EKG, Troponin, and serial changes to determine if it is an Acute Myocardial Infarction or myocardial injury due to an underlying chronic condition.         COVID PRE-OP / PRE-PROCEDURE SCREENING ORDER (NO ISOLATION) - Swab, Nasopharynx [[id number]]  (Normal) Collected: 04/10/24 1746    Specimen: Swab from Nasopharynx Updated: 04/10/24 1814    Narrative:      The following orders were created for panel order COVID PRE-OP / PRE-PROCEDURE SCREENING ORDER (NO ISOLATION) - Swab, Nasopharynx.  Procedure                               Abnormality         Status                     ---------                               -----------         ------                     COVID-19 and FLU A/B PCR...[[id number]]  Normal              Final result                 Please view results for these tests on the individual orders.    COVID-19 and FLU A/B PCR, 1 HR TAT - Swab, Nasopharynx [[id number]]  (Normal) Collected: 04/10/24 1746    Specimen: Swab from Nasopharynx Updated: 04/10/24 1814     COVID19 Not Detected     Influenza A PCR Not  Detected     Influenza B PCR Not Detected    Narrative:      Fact sheet for providers: https://www.fda.gov/media/653725/download    Fact sheet for patients: https://www.fda.gov/media/433264/download    Test performed by PCR.    PSA Screen [[id number]] Collected: 04/10/24 2118    Specimen: Blood Updated: 04/10/24 2118             CT Abdomen Pelvis With Contrast    Result Date: 4/10/2024  CT ABDOMEN PELVIS W CONTRAST Date of Exam: 4/10/2024 6:16 PM EDT Indication: nausea. Comparison: None available. Technique: Axial CT images were obtained of the abdomen and pelvis following the uneventful intravenous administration of 80 mL of Isovue-370 intravenous contrast. Reconstructed coronal and sagittal images were also obtained. Automated exposure control and iterative construction methods were used. Findings: Lung Bases:  Mild bibasilar atelectasis or scarring and trace pleural effusions Liver:Liver is normal in size and CT density. No focal lesions. Biliary/Gallbladder: The gallbladder is without evidence of radiopaque stones. The biliary tree is nondilated. Spleen:Spleen is normal in size and CT density. Pancreas: Pancreas shows homogeneous density. There is no evidence of pancreatic mass or peripancreatic fluid. Kidneys: Kidneys are normal in size. Nonobstructive right renal stone. There is no hydronephrosis. There are few simple appearing left renal cysts. Small parapelvic right renal cyst Adrenals: Adrenal glands are unremarkable. Retroperitoneal/Lymph Nodes/Vasculature: There is a 4.8 cm pathologically enlarged left para-aortic lymph node. Additional subcentimeter retroperitoneal lymph nodes are seen Gastrointestinal/Mesentery: The stomach and small bowel loops are normal in caliber. Surgical changes are seen status post left hemicolectomy with a left lower quadrant colostomy. Mildly prominent base of the prostate with a small nodule measuring 1.2 cm versus small mass at the base of the bladder. Diffuse bladder wall  thickening with trabeculated bladder wall   Bony Structures:  Visualized bony structures are consistent with the patient's age.     Impression: Impression: 1. Left hemicolectomy changes with a left lower quadrant colostomy 2. Pathologically enlarged left para-aortic lymph node compatible with metastatic disease in this clinical setting 3. Mild basilar atelectasis and trace pleural effusions 4. Small nodule at the base of the prostate versus base of the bladder and diffusely trabeculated bladder wall. Correlation with a bladder ultrasound recommended Electronically Signed: Yovany Wilks MD  4/10/2024 7:09 PM EDT  Workstation ID: [id number]    CT Angiogram Chest Pulmonary Embolism    Result Date: 4/10/2024  CT ANGIOGRAM CHEST PULMONARY EMBOLISM Date of Exam: 4/10/2024 4:07 PM EDT Indication: h/o afib s/p watchmen procedure soa. Comparison: None available. Technique: Axial CT images were obtained of the chest after the uneventful intravenous administration of Isovue-370, 85 mL utilizing pulmonary embolism protocol.  Reconstructed coronal and sagittal images were also obtained. Automated exposure control and iterative construction methods were used. Findings: Pulmonary arteries: Adequate opacification of the pulmonary arteries. No evidence of acute pulmonary embolism. Thoracic aorta: The thoracic aorta is not opacified with contrast due to bolus timing. Thoracic aorta is normal in caliber and contour. Small scattered atheromatous calcifications are visualized. Thyroid: The visualized portion of the thyroid is unremarkable. Lungs and Pleura: Mild bilateral dependent atelectasis visualized. Small right pleural effusion is visualized. No suspicious pulmonary nodules. No pneumothorax. Mediastinum/Leigha: No mediastinal or hilar lymphadenopathy. Lymph nodes: No axillary or supraclavicular lymphadenopathy. Cardiovascular: The heart is normal in size.No evidence of pericardial effusion. Patient is post watchman procedure. Small  coronary calcifications are visualized. Upper Abdomen: There is a left retroperitoneal soft tissue mass abutting the abdominal aorta measuring 3.6 x 4.8 cm. Evaluation is incomplete on this chest CTA however, lesion may have slightly increased in size when compared to prior CT performed on September 24, 2019. Bones and Soft Tissue: No acute fracture, aggressive osseous lesions, or soft tissue process.     Impression: Impression: No evidence of pulmonary embolism. Post watchman procedure. Small right pleural effusion. Again visualized is a left retroperitoneal soft tissue mass measuring 3.6 x 4.8 cm. Evaluation is incomplete on this chest CTA however, lesion may have slightly increased in size when compared to prior abdominal CT performed on September 24, 2019 (previously measured 3.2 x 4.2 cm). If not already performed, consider evaluation by means of percutaneous tissue sampling as clinically warranted. Electronically Signed: Prem Kevin MD  4/10/2024 4:41 PM EDT  Workstation ID: [id number]    XR Chest 1 View    Result Date: 4/10/2024  XR CHEST 1 VW Date of Exam: 4/10/2024 1:29 PM EDT Indication: SOA triage protocol Comparison: 6/12/2010 Findings: Heart shadow is in the range of normal size. Pulmonary vasculature appears upper limits of normal. There appears to be a left atrial appendage exclusion device in place. There is a suggestion of a small right pleural effusion, however, blunting of the lateral costophrenic angle is also present in 2010 and this may represent scarring instead. No new pulmonary parenchymal disease is seen. There is no evidence of pneumothorax.     Impression: Impression: 1. Heart shadow in the upper range of normal size. Mild pulmonary venous hypertension. No evidence of pulmonary edema. 2. Blunting of the right lateral costophrenic angle, but unchanged from 2010. Consider small recurrent pleural effusion, or chronic and stable mild right basilar pleural scarring. 3. No other evidence of  active chest pathology. Electronically Signed: Navdeep Rodriguez MD  4/10/2024 1:46 PM EDT  Workstation ID: [id number]        Medical Decision Making  Patient Presentation 78-year-old male presented with shortness of breath, home initial assessment, he seemed dyspneic on exertion    DDX bronchitis, pneumonia, PE, congestive heart failure, dehydration, cardiac arrhythmia    Data Review/ Non ED Records /Analysis/Ordering unique tests  Review of previous  non ED visits, prior labs, prior imaging, available notes from prior evaluations or visits with specialists, medication list, allergies, past medical history, past surgical history        Independent Review Studies  I Personally reviewed all laboratory studies performed in the emergency department     Intervention/Re-evaluation intervention included antihypertensives for hypertension, nausea medication, pain medication on reevaluation he continued to have symptoms    Independent Clinician discussed with my supervising physician Dr. Nieto, discussed with the on-call hospitalist Dr. Recinos who accepted for admission    Risk Stratification tools/clinical decision rules patient presented with multiple complaints including dyspnea, fatigue, weakness, headache, he was found to be hypertensive, he received initially several doses of IV antihypertensive unsuccessfully at which point a Cardene drip was initiated. .High Probability of sudden clinically significant, or life threatening deterioration in the patients condition requiring direct delivery of care and management,  and high complexity decision making   patient continued to be dyspneic while in the ED and a CT scan that was concerning for periaortic lymph node enlarged.  Patient was admitted for further care    Shared Decision Making discussed this plan of care with patient and family and they were agreeable    Disposition patient stable for admission    Problems Addressed:  Dyspnea, unspecified type: complicated acute illness or  injury  Hypertensive urgency: complicated acute illness or injury    Amount and/or Complexity of Data Reviewed  External Data Reviewed: labs, radiology and notes.  Labs: ordered. Decision-making details documented in ED Course.  Radiology: ordered.  ECG/medicine tests: ordered.    Risk  Prescription drug management.  Decision regarding hospitalization.          Final diagnoses:   Dyspnea, unspecified type   Hypertensive urgency           Disposition admission       Aristeo Mcgowan Jr., PANCHO  04/10/24 2200       Aristeo Mcgowan Jr., PA-C  04/10/24 2217

## 2024-04-11 NOTE — PROGRESS NOTES
[facility]  PROGRESS NOTE    Patient Name: Luis Gamboa  : 1945  MRN: [id number]    Date of Admission: 4/10/2024  Primary Care Physician: Anand De Souza MD    Subjective   Subjective     CC:  Shortness of air    HPI:  Patient resting in bed with wife at bedside. Patient denies chest pain and states his back is not hurting. He slept well and is overall feeling much better. No acute complaints.      Objective   Objective     Vital Signs:   Temp:  [97 °F (36.1 °C)-98.4 °F (36.9 °C)] 98.4 °F (36.9 °C)  Heart Rate:  [] 81  Resp:  [17-20] 18  BP: (100-198)/() 132/85     Physical Exam:  Constitutional: No acute distress, awake, alert  HENT: NCAT, mucous membranes moist  Respiratory: Clear to auscultation bilaterally, respiratory effort normal   Cardiovascular: RRR, no murmurs, rubs, or gallops  Gastrointestinal: Positive bowel sounds, soft, nontender, nondistended  Musculoskeletal: No bilateral ankle edema  Psychiatric: Appropriate affect, cooperative  Neurologic: Oriented x 3, GARZON, speech clear  Skin: No rashes    Results Reviewed:  LAB RESULTS:      Lab 24  0613 04/10/24  1346   WBC 3.85 5.42   HEMOGLOBIN 14.5 15.8   HEMATOCRIT 42.2 46.2   PLATELETS 161 212   NEUTROS ABS 3.06 4.16   IMMATURE GRANS (ABS) 0.03 0.04   LYMPHS ABS 0.33* 0.63*   MONOS ABS 0.36 0.50   EOS ABS 0.04 0.06   MCV 98.1* 96.5         Lab 24  0613 04/10/24  1346   SODIUM 140 139   POTASSIUM 4.2 4.1   CHLORIDE 102 100   CO2 24.0 28.0   ANION GAP 14.0 11.0   BUN 17 19   CREATININE 1.28* 1.43*   EGFR 57.6* 50.5*   GLUCOSE 85 97   CALCIUM 8.9 9.5   TSH 1.850  --          Lab 04/10/24  1346   TOTAL PROTEIN 7.3   ALBUMIN 4.3   GLOBULIN 3.0   ALT (SGPT) 16   AST (SGOT) 26   BILIRUBIN 0.8   ALK PHOS 88         Lab 04/10/24  1702 04/10/24  1346   PROBNP  --  9,182.0*   HSTROP T 22* 25*                 Brief Urine Lab Results  (Last result in the past 365 days)        Color    Clarity   Blood   Leuk Est   Nitrite   Protein   CREAT   Urine HCG        04/10/24 2325             51.7         04/10/24 2325 Yellow   Clear   Negative   Negative   Negative   Trace                   Microbiology Results Abnormal       Procedure Component Value - Date/Time    COVID PRE-OP / PRE-PROCEDURE SCREENING ORDER (NO ISOLATION) - Swab, Nasopharynx [[id number]]  (Normal) Collected: 04/10/24 1746    Lab Status: Final result Specimen: Swab from Nasopharynx Updated: 04/10/24 1814    Narrative:      The following orders were created for panel order COVID PRE-OP / PRE-PROCEDURE SCREENING ORDER (NO ISOLATION) - Swab, Nasopharynx.  Procedure                               Abnormality         Status                     ---------                               -----------         ------                     COVID-19 and FLU A/B PCR...[[id number]]  Normal              Final result                 Please view results for these tests on the individual orders.    COVID-19 and FLU A/B PCR, 1 HR TAT - Swab, Nasopharynx [[id number]]  (Normal) Collected: 04/10/24 1746    Lab Status: Final result Specimen: Swab from Nasopharynx Updated: 04/10/24 1814     COVID19 Not Detected     Influenza A PCR Not Detected     Influenza B PCR Not Detected    Narrative:      Fact sheet for providers: https://www.fda.gov/media/373000/download    Fact sheet for patients: https://www.fda.gov/media/007110/download    Test performed by PCR.            CT Abdomen Pelvis With Contrast    Result Date: 4/10/2024  CT ABDOMEN PELVIS W CONTRAST Date of Exam: 4/10/2024 6:16 PM EDT Indication: nausea. Comparison: None available. Technique: Axial CT images were obtained of the abdomen and pelvis following the uneventful intravenous administration of 80 mL of Isovue-370 intravenous contrast. Reconstructed coronal and sagittal images were also obtained. Automated exposure control and iterative construction methods were used. Findings: Lung Bases:  Mild bibasilar  atelectasis or scarring and trace pleural effusions Liver:Liver is normal in size and CT density. No focal lesions. Biliary/Gallbladder: The gallbladder is without evidence of radiopaque stones. The biliary tree is nondilated. Spleen:Spleen is normal in size and CT density. Pancreas: Pancreas shows homogeneous density. There is no evidence of pancreatic mass or peripancreatic fluid. Kidneys: Kidneys are normal in size. Nonobstructive right renal stone. There is no hydronephrosis. There are few simple appearing left renal cysts. Small parapelvic right renal cyst Adrenals: Adrenal glands are unremarkable. Retroperitoneal/Lymph Nodes/Vasculature: There is a 4.8 cm pathologically enlarged left para-aortic lymph node. Additional subcentimeter retroperitoneal lymph nodes are seen Gastrointestinal/Mesentery: The stomach and small bowel loops are normal in caliber. Surgical changes are seen status post left hemicolectomy with a left lower quadrant colostomy. Mildly prominent base of the prostate with a small nodule measuring 1.2 cm versus small mass at the base of the bladder. Diffuse bladder wall thickening with trabeculated bladder wall   Bony Structures:  Visualized bony structures are consistent with the patient's age.     Impression: Impression: 1. Left hemicolectomy changes with a left lower quadrant colostomy 2. Pathologically enlarged left para-aortic lymph node compatible with metastatic disease in this clinical setting 3. Mild basilar atelectasis and trace pleural effusions 4. Small nodule at the base of the prostate versus base of the bladder and diffusely trabeculated bladder wall. Correlation with a bladder ultrasound recommended Electronically Signed: Yovany Wilks MD  4/10/2024 7:09 PM EDT  Workstation ID: [id number]    CT Angiogram Chest Pulmonary Embolism    Result Date: 4/10/2024  CT ANGIOGRAM CHEST PULMONARY EMBOLISM Date of Exam: 4/10/2024 4:07 PM EDT Indication: h/o afib s/p watchmen procedure soa.  Comparison: None available. Technique: Axial CT images were obtained of the chest after the uneventful intravenous administration of Isovue-370, 85 mL utilizing pulmonary embolism protocol.  Reconstructed coronal and sagittal images were also obtained. Automated exposure control and iterative construction methods were used. Findings: Pulmonary arteries: Adequate opacification of the pulmonary arteries. No evidence of acute pulmonary embolism. Thoracic aorta: The thoracic aorta is not opacified with contrast due to bolus timing. Thoracic aorta is normal in caliber and contour. Small scattered atheromatous calcifications are visualized. Thyroid: The visualized portion of the thyroid is unremarkable. Lungs and Pleura: Mild bilateral dependent atelectasis visualized. Small right pleural effusion is visualized. No suspicious pulmonary nodules. No pneumothorax. Mediastinum/Leigha: No mediastinal or hilar lymphadenopathy. Lymph nodes: No axillary or supraclavicular lymphadenopathy. Cardiovascular: The heart is normal in size.No evidence of pericardial effusion. Patient is post watchman procedure. Small coronary calcifications are visualized. Upper Abdomen: There is a left retroperitoneal soft tissue mass abutting the abdominal aorta measuring 3.6 x 4.8 cm. Evaluation is incomplete on this chest CTA however, lesion may have slightly increased in size when compared to prior CT performed on September 24, 2019. Bones and Soft Tissue: No acute fracture, aggressive osseous lesions, or soft tissue process.     Impression: Impression: No evidence of pulmonary embolism. Post watchman procedure. Small right pleural effusion. Again visualized is a left retroperitoneal soft tissue mass measuring 3.6 x 4.8 cm. Evaluation is incomplete on this chest CTA however, lesion may have slightly increased in size when compared to prior abdominal CT performed on September 24, 2019 (previously measured 3.2 x 4.2 cm). If not already performed,  consider evaluation by means of percutaneous tissue sampling as clinically warranted. Electronically Signed: Prem Kevin MD  4/10/2024 4:41 PM EDT  Workstation ID: [id number]    XR Chest 1 View    Result Date: 4/10/2024  XR CHEST 1 VW Date of Exam: 4/10/2024 1:29 PM EDT Indication: SOA triage protocol Comparison: 6/12/2010 Findings: Heart shadow is in the range of normal size. Pulmonary vasculature appears upper limits of normal. There appears to be a left atrial appendage exclusion device in place. There is a suggestion of a small right pleural effusion, however, blunting of the lateral costophrenic angle is also present in 2010 and this may represent scarring instead. No new pulmonary parenchymal disease is seen. There is no evidence of pneumothorax.     Impression: Impression: 1. Heart shadow in the upper range of normal size. Mild pulmonary venous hypertension. No evidence of pulmonary edema. 2. Blunting of the right lateral costophrenic angle, but unchanged from 2010. Consider small recurrent pleural effusion, or chronic and stable mild right basilar pleural scarring. 3. No other evidence of active chest pathology. Electronically Signed: Navdeep Rodriguez MD  4/10/2024 1:46 PM EDT  Workstation ID: [id number]     Results for orders placed during the hospital encounter of 01/03/24    Adult Transesophageal Echo 3D (ARCHIE) W/ Cont If Necessary Per Protocol    Interpretation Summary    27 mm Watchman FLX atrial appendage occlusion device in place.  There is a small jet of periprosthetic flow measuring proximately 2-3 mm.  This is consistent with adequate left atrial appendage occlusion.    Left ventricular ejection fraction appears to be 51 - 55%.    Mild to moderate mitral valve regurgitation is present.    There is severe, (grade 4) plaque in the aortic arch present.      Current medications:  Scheduled Meds:vitamin C, 500 mg, Oral, Daily  aspirin, 81 mg, Oral, Daily  Budesonide, 6 mg, Oral, Daily  finasteride, 5 mg, Oral,  Daily  gabapentin, 300 mg, Oral, Q8H  heparin (porcine), 5,000 Units, Subcutaneous, Q12H  hydrOXYzine, 10 mg, Oral, Once  latanoprost, 1 drop, Both Eyes, Nightly  magnesium oxide, 400 mg, Oral, Daily  multivitamin with minerals, 1 tablet, Oral, Daily  nebivolol, 5 mg, Oral, Daily  pantoprazole, 40 mg, Oral, Daily  sertraline, 50 mg, Oral, Daily  sodium chloride, 10 mL, Intravenous, Q12H  tamsulosin, 0.4 mg, Oral, Daily      Continuous Infusions:niCARdipine, 5-15 mg/hr, Last Rate: Stopped (04/10/24 2333)      PRN Meds:.  acetaminophen **OR** acetaminophen **OR** acetaminophen    senna-docusate sodium **AND** polyethylene glycol **AND** bisacodyl **AND** bisacodyl    HYDROcodone-acetaminophen    melatonin    nitroglycerin    ondansetron ODT **OR** ondansetron    sodium chloride    sodium chloride    sodium chloride    Assessment & Plan   Assessment & Plan     Active Hospital Problems    Diagnosis  POA    **Dyspnea [R06.00]  Yes    Elevated serum creatinine [R79.89]  Unknown    Retroperitoneal mass [R19.00]  Unknown    Pleural effusion [J90]  Unknown    GERD without esophagitis [K21.9]  Unknown    Atrial fibrillation, persistent [I48.19]  Yes    Dyslipidemia [E78.5]  Yes    ZAY on CPAP [G47.33]  Yes      Resolved Hospital Problems   No resolved problems to display.        Brief Hospital Course to date:  Luis Gamboa is a 78 y.o. male with a history of anxiety, depression, hypertension, hyperlipidemia, DVT, Crohn's disease s/p partial colectomy and colostomy, A-fib, ZAY on CPAP, presents to the ED with complaints of shortness of air, and generalized weakness.      This patient's problems and plans were partially entered by my partner and updated as appropriate by me 04/11/24.    All problems are new to me today.     Assessment and Plan:     Hypertensive urgency  A-fib s/p Watchman device  Hyperlipidemia  -- Continue nebivolol--missed dose 4/10  -- cardene drip stopped overnight  -- cardiology consult     Elevated  serum creatinine  -- Creatinine 1.43 on admission--> trended down to 1.28 overunight  -- baseline creatinine ~ 1.1-1.2    Left retroperitoneal mass  Enlarged para-aortic lymph node   --CTA chest shows left retroperitoneal soft tissue mass measuring 3.6 x 4.8 cm, lesion may have slightly increased in size when compared to prior CT abdomen September 2019  -- CT abdomen pelvis shows pathologically enlarged left para-aortic lymph node compatible with metastatic disease  -- had biopsy November 2019 at [facility]--12/19/19 path shows Schwannoma, patient chose to continue with observation.  Last imaging at  was 12/17/21 showed no growth  -- follow up with Dr. Ponce at      Prostate versus bladder nodule  --CT abdomen pelvis shows a small nodule at the base of the prostate versus base of the bladder and diffusely trabeculated bladder wall  -- renal ultrasound pending  -- PSA: 4.49     Right pleural effusion  -- CTA chest shows small right pleural effusion  -- monitor     Left L5 radiculopathy secondary to disc protrusion  -- Follows with Dr. Dillard  -- Gabapentin 300 mg 3 times a day  -- Consult PT  -- Per Dr. Dillard's note from 4/10, if patient not improving strongly suggest L4-5 discectomy     ZAY  -- CPAP     GERD  -- PPI     Crohn's disease with partial colectomy and colostomy  -- Follows with Dr. Lester     Remote left lower extremity DVT     BPH  -- Continue Flomax     Anxiety and depression  -- Continue Zoloft     Polymyalgia rheumatica  -- Follows with rheumatology     Generalized weakness  -- fall precautions  -- pt/ot consult    Expected Discharge Location and Transportation: home  Expected Discharge   Expected Discharge Date: 4/12/2024; Expected Discharge Time:      DVT prophylaxis:  Medical and mechanical DVT prophylaxis orders are present.         AM-PAC 6 Clicks Score (PT): 18 (04/10/24 3599)    CODE STATUS:   Code Status and Medical Interventions:   Ordered at: 04/10/24 8456     Level Of  Support Discussed With:    Patient     Code Status (Patient has no pulse and is not breathing):    CPR (Attempt to Resuscitate)     Medical Interventions (Patient has pulse or is breathing):    Full Support       Linda Zaidi DO  04/11/24

## 2024-04-11 NOTE — THERAPY EVALUATION
Patient Name: Luis Gamboa  : 1945    MRN: [id number]                              Today's Date: 2024       Admit Date: 4/10/2024    Visit Dx:     ICD-10-CM ICD-9-CM   1. Dyspnea, unspecified type  R06.00 786.09   2. Hypertensive urgency  I16.0 401.9     Patient Active Problem List   Diagnosis    Hypertensive cardiovascular disease    Dyslipidemia    Labile hypertension    ZAY on CPAP    Hiatal hernia    Crohn's disease    Chronic anxiety    Chronic insomnia    Allergic rhinitis    Leukopenia    Atrial fibrillation, persistent    AF (atrial fibrillation)    Chest pain, atypical    Dyspnea    Elevated serum creatinine    Retroperitoneal mass    Pleural effusion    GERD without esophagitis    Hypertensive urgency     Past Medical History:   Diagnosis Date    Allergic rhinitis 2016    Intermittent    Atrial fibrillation     Chronic anxiety 2016    Chronic intermittent anxiety/remote depression.     Chronic insomnia 2016    Colostomy in place     Crohn's disease 2016    Crohn’s disease with partial colectomy and colostomy - data deficit.     DVT (deep venous thrombosis)     Remote left calf DVT/Coumadin therapy - data deficit.    Dyslipidemia 2016    Moderate    Enlarged prostate     Glaucoma     Hiatal hernia 2016    probable GERD, 2009.    Hyperlipidemia     Hypertensive cardiovascular disease 2016    Probable hypertensive cardiovascular disease: Remote progressive exertional dyspnea/chest pain syndrome with acceptable GXT and borderline abnormal acceptable Holter monitor, 2009. Recurrent progressive NYHA class III symptoms with abnormal EKG and acceptable chest x-ray with essentially normal right dominant coronary artery and preserved systolic LV dysfunction, LVEF (0.66) with continued m    Labile hypertension 2016    Myalgia due to statin 2024    Obstructive sleep apnea on CPAP 2016    Obstructive sleep apnea with intermittent  CPAP therapy (did not tolerate).  --PATIENT STATES THE DOES NOT USE MACHINE ALL THE TIME    Vitamin deficiency     Chronic vitamin deficiency secondary to Crohn’s disease.       Past Surgical History:   Procedure Laterality Date    ATRIAL APPENDAGE EXCLUSION LEFT WITH TRANSESOPHAGEAL ECHOCARDIOGRAM N/A 11/13/2023    Procedure: Atrial Appendage Occlusion;  Surgeon: Tevin Esparza MD;  Location: [facility] INVASIVE LOCATION;  Service: Cardiology;  Laterality: N/A;    CARDIOVERSION      1/16/2023 PER DR. VICKERS    COLECTOMY PARTIAL / TOTAL  2001    with colostomy    HAND SURGERY  2004    secondary to trauma      General Information       Row Name 04/11/24 1154          Physical Therapy Time and Intention    Document Type evaluation  -TIERRA     Mode of Treatment physical therapy  -TIERRA       Row Name 04/11/24 1154          General Information    Patient Profile Reviewed yes  -TIERRA     Prior Level of Function independent:;bed mobility;ADL's;gait;transfer  -TIERRA     Existing Precautions/Restrictions fall  -TIERRA     Barriers to Rehab none identified  -TIERRA       Row Name 04/11/24 1154          Living Environment    People in Home spouse  -TIERRA       Row Name 04/11/24 1154          Home Main Entrance    Number of Stairs, Main Entrance two  -TIERRA       Row Name 04/11/24 1154          Cognition    Orientation Status (Cognition) oriented x 4  -TIERRA       Row Name 04/11/24 1154          Safety Issues, Functional Mobility    Safety Issues Affecting Function (Mobility) safety precautions follow-through/compliance  -TIERRA     Impairments Affecting Function (Mobility) balance;endurance/activity tolerance;strength;pain  -TIERRA               User Key  (r) = Recorded By, (t) = Taken By, (c) = Cosigned By      Initials Name Provider Type    TIERRA Jacqueline Pitt PT Physical Therapist                   Mobility       Row Name 04/11/24 1155          Bed Mobility    Bed Mobility rolling left;rolling right;scooting/bridging;supine-sit  -TIERRA     Rolling Left  [place] (Bed Mobility) modified independence  -TIERRA     Rolling Right [place] (Bed Mobility) modified independence  -TIERRA     Scooting/Bridging [place] (Bed Mobility) modified independence  -TIERRA     Supine-Sit [place] (Bed Mobility) modified independence  -TIERRA     Assistive Device (Bed Mobility) head of bed elevated;bed rails  -TIERRA     Comment, (Bed Mobility) patient takes increased time and effort  -TIERRA       Row Name 04/11/24 1155          Transfers    Comment, (Transfers) patient transfers on and off the commode  -TIERRA       Row Name 04/11/24 1155          Bed-Chair Transfer    Bed-Chair [place] (Transfers) contact guard  -TIERRA       Row Name 04/11/24 1155          Sit-Stand Transfer    Sit-Stand [place] (Transfers) contact guard  -TIERRA       Row Name 04/11/24 1155          Gait/Stairs (Locomotion)    [place] Level (Gait) contact guard  -TIERRA     Assistive Device (Gait) walker, front-wheeled  -TIERRA     Distance in Feet (Gait) 400  -TIERRA     Deviations/Abnormal Patterns (Gait) left sided deviations;jeff decreased;stride length decreased  -TIERRA     Bilateral Gait Deviations forward flexed posture  -TIERRA     Left Sided Gait Deviations weight shift ability decreased  -TIERRA     Comment, (Gait/Stairs) patient with decreased weight shifting to the left due to leg pain from back pain  -TIERRA               User Key  (r) = Recorded By, (t) = Taken By, (c) = Cosigned By      Initials Name Provider Type    Jacqueline Lopez PT Physical Therapist                   Obj/Interventions       Row Name 04/11/24 1156          Range of Motion Comprehensive    General Range of Motion no range of motion deficits identified  -TIERRA       Row Name 04/11/24 1156          Strength Comprehensive (MMT)    Comment, General Manual Muscle Testing (MMT) Assessment generalized LE weakness 4-/5  -TIERRA       Row Name 04/11/24 1156          Balance    Balance Assessment sitting static balance;sitting dynamic balance;standing static  balance;standing dynamic balance  -TIERRA     Static Sitting Balance independent  -TIERRA     Dynamic Sitting Balance independent  -TIERRA     Position, Sitting Balance unsupported;sitting edge of bed  -TIERRA     Static Standing Balance contact guard  -TIERRA     Dynamic Standing Balance contact guard  -TIERRA     Position/Device Used, Standing Balance supported;walker, front-wheeled  -TIERRA               User Key  (r) = Recorded By, (t) = Taken By, (c) = Cosigned By      Initials Name Provider Type    TIERRA Jacqueline Pitt A, PT Physical Therapist                   Goals/Plan       Row Name 04/11/24 1201          Bed Mobility Goal 1 (PT)    Activity/Assistive Device (Bed Mobility Goal 1, PT) bed mobility activities, all  -TIERRA     [place] Level/Cues Needed (Bed Mobility Goal 1, PT) independent  -TIERRA     Time Frame (Bed Mobility Goal 1, PT) long term goal (LTG);10 days  -TIERRA     Progress/Outcomes (Bed Mobility Goal 1, PT) goal ongoing  -TIERRA       Row Name 04/11/24 1201          Transfer Goal 1 (PT)    Activity/Assistive Device (Transfer Goal 1, PT) sit-to-stand/stand-to-sit  -TIERRA     [place] Level/Cues Needed (Transfer Goal 1, PT) independent  -TIERRA     Time Frame (Transfer Goal 1, PT) long term goal (LTG);10 days  -TIERRA     Progress/Outcome (Transfer Goal 1, PT) goal ongoing  -TIERRA       Row Name 04/11/24 1201          Gait Training Goal 1 (PT)    Activity/Assistive Device (Gait Training Goal 1, PT) gait (walking locomotion)  -TIERRA     [place] Level (Gait Training Goal 1, PT) independent  -TIERRA     Distance (Gait Training Goal 1, PT) 500  -TIERRA     Time Frame (Gait Training Goal 1, PT) long term goal (LTG);10 days  -TIERRA     Progress/Outcome (Gait Training Goal 1, PT) goal ongoing  -TIERRA       Row Name 04/11/24 1201          Therapy Assessment/Plan (PT)    Planned Therapy Interventions (PT) balance training;bed mobility training;gait training;home exercise program;transfer training;strengthening  -TIERRA               User Key  (r) = Recorded By, (t) =  Taken By, (c) = Cosigned By      Initials Name Provider Type    TIERRA Jacqueline Pitt, PT Physical Therapist                   Clinical Impression       Row Name 04/11/24 1157          Pain    Pretreatment Pain Rating 4/10  -TIERRA     Posttreatment Pain Rating 6/10  -TIERRA     Pain Location - back  -TIERRA     Pain Intervention(s) Repositioned;Ambulation/increased activity  -TIERRA       Row Name 04/11/24 1157          Plan of Care Review    Plan of Care Reviewed With patient;spouse  -TIERRA     Progress improving  -TIERRA     Outcome Evaluation PT eval is completed. patient presents with CHF exacerbation. patient demonstrates impaired bed mobility transfers and gait compared to baseline status patient continues to be hypertensive  RN had just given BP meds prior to activity. anticipate patient to be able to go back home at D/C with wife's assist.  -TIERRA       Row Name 04/11/24 1157          Therapy Assessment/Plan (PT)    Patient/Family Therapy Goals Statement (PT) return to PLOF  -TIERRA     Rehab Potential (PT) good, to achieve stated therapy goals  -TIERRA     Criteria for Skilled Interventions Met (PT) yes;skilled treatment is necessary  -TIERRA     Therapy Frequency (PT) daily  -TIERRA       Row Name 04/11/24 1157          Vital Signs    Pre Systolic BP Rehab 140   -TIERRA     Pre Treatment Diastolic    -TIERRA     Post Systolic BP Rehab 154   RN aware and had just given BP meds to patient he will recheck patient in a few minutes  -TIERRA     Post Treatment Diastolic    -TIERRA     Pre Patient Position Supine  -TIERRA     Intra Patient Position Standing  -TIERRA     Post Patient Position Sitting  -TIERRA       Row Name 04/11/24 1157          Positioning and Restraints    Pre-Treatment Position in bed  -TIERRA     Post Treatment Position chair  -TIERRA     In Chair notified nsg;reclined;sitting;call light within reach;encouraged to call for assist;exit alarm on;with family/caregiver  -TIERRA               User Key  (r) = Recorded By, (t) = Taken By, (c) = Cosigned By       Initials Name Provider Type    Jacqueline Lopez, PT Physical Therapist                   Outcome Measures       Row Name 04/11/24 1202 04/11/24 0800       How much help from another person do you currently need...    Turning from your back to your side while in flat bed without using bedrails? 4  -TIERRA 3  -CB    Moving from lying on back to sitting on the side of a flat bed without bedrails? 3  -TIERRA 3  -CB    Moving to and from a bed to a chair (including a wheelchair)? 3  -TIERRA 3  -CB    Standing up from a chair using your arms (e.g., wheelchair, bedside chair)? 3  -TIERRA 3  -CB    Climbing 3-5 steps with a railing? 3  -TIERRA 3  -CB    To walk in hospital room? 3  -TIERRA 3  -CB    AM-PAC 6 Clicks Score (PT) 19  -TIERRA 18  -CB    Highest Level of Mobility Goal 6 --> Walk 10 steps or more  -TIERRA 6 --> Walk 10 steps or more  -CB              User Key  (r) = Recorded By, (t) = Taken By, (c) = Cosigned By      Initials Name Provider Type    Jacqueline Lopez, PT Physical Therapist    Eloy Harp, RN Registered Nurse                                 Physical Therapy Education       Title: PT OT SLP Therapies (In Progress)       Topic: Physical Therapy (In Progress)       Point: Mobility training (In Progress)       Learning Progress Summary             Patient Acceptance, E, NR by TIERRA at 4/11/2024 1015                         Point: Home exercise program (In Progress)       Learning Progress Summary             Patient Acceptance, E, NR by TIERRA at 4/11/2024 1015                         Point: Body mechanics (In Progress)       Learning Progress Summary             Patient Acceptance, E, NR by TIERRA at 4/11/2024 1015                         Point: Precautions (In Progress)       Learning Progress Summary             Patient Acceptance, E, NR by TIERRA at 4/11/2024 1015                                         User Key       Initials Effective Dates Name Provider Type Discipline    TIERRA 02/03/23 -  Jacqueline Pitt PT Physical  Therapist PT                  PT Recommendation and Plan  Planned Therapy Interventions (PT): balance training, bed mobility training, gait training, home exercise program, transfer training, strengthening  Plan of Care Reviewed With: patient, spouse  Progress: improving  Outcome Evaluation: PT eval is completed. patient presents with CHF exacerbation. patient demonstrates impaired bed mobility transfers and gait compared to baseline status patient continues to be hypertensive  RN had just given BP meds prior to activity. anticipate patient to be able to go back home at D/C with wife's assist.     Time Calculation:   PT Evaluation Complexity  History, PT Evaluation Complexity: 3 or more personal factors and/or comorbidities  Examination of Body Systems (PT Eval Complexity): total of 4 or more elements  Clinical Presentation (PT Evaluation Complexity): evolving  Clinical Decision Making (PT Evaluation Complexity): moderate complexity  Overall Complexity (PT Evaluation Complexity): moderate complexity     PT Charges       Row Name 04/11/24 1203             Time Calculation    Start Time 1015  -TIERRA      PT Received On 04/11/24  -TIERRA      PT Goal Re-Cert Due Date 04/21/24  -TIERRA         Untimed Charges    PT Eval/Re-eval Minutes 47  -TIERRA         Total Minutes    Untimed Charges Total Minutes 47  -TIERRA       Total Minutes 47  -TIERRA                User Key  (r) = Recorded By, (t) = Taken By, (c) = Cosigned By      Initials Name Provider Type    Jacqueline Lopez, PT Physical Therapist                  Therapy Charges for Today       Code Description Service Date Service Provider Modifiers Qty    16379471089 HC PT EVAL MOD COMPLEXITY 4 4/11/2024 Jacqueline Pitt PT GP 1            PT G-Codes  AM-PAC 6 Clicks Score (PT): 19  PT Discharge Summary  Anticipated Discharge Disposition (PT): home with assist    Jacqueline Pitt PT  4/11/2024

## 2024-04-11 NOTE — PLAN OF CARE
Goal Outcome Evaluation:           Progress: improving  Outcome Evaluation: Patient has had a decent shift, awake for most of today. VSS, and patient has been alert and oriented times four. No complaints of pain today. Wife at bedside. B/P is being maintained with PO medications. Nursing staff will continue to monitor and assess the patient.

## 2024-04-11 NOTE — ED NOTES
Luis Gamboa    Nursing Report ED to Floor:  Mental status: A&Ox4  Ambulatory status: Uses cane d/t herniated disc  Oxygen Therapy:  RA  Cardiac Rhythm: Afib; chronic  Admitted from: ED from home  Safety Concerns:  Fall risk  Social Issues: None  ED Room #:  05    ED Nurse Phone Extension - [phone] or may call [phone].      HPI:   Chief Complaint   Patient presents with    Shortness of Breath    Nausea       Past Medical History:  Past Medical History:   Diagnosis Date    Allergic rhinitis 11/03/2016    Intermittent    Atrial fibrillation     Chronic anxiety 11/03/2016    Chronic intermittent anxiety/remote depression.     Chronic insomnia 11/03/2016    Colostomy in place     Crohn's disease 11/03/2016    Crohn’s disease with partial colectomy and colostomy - data deficit.     DVT (deep venous thrombosis)     Remote left calf DVT/Coumadin therapy - data deficit.    Dyslipidemia 11/03/2016    Moderate    Enlarged prostate     Glaucoma     Hiatal hernia 11/03/2016    probable GERD, April 2009.    Hyperlipidemia     Hypertensive cardiovascular disease 11/03/2016    Probable hypertensive cardiovascular disease: Remote progressive exertional dyspnea/chest pain syndrome with acceptable GXT and borderline abnormal acceptable Holter monitor, April 2009. Recurrent progressive NYHA class III symptoms with abnormal EKG and acceptable chest x-ray with essentially normal right dominant coronary artery and preserved systolic LV dysfunction, LVEF (0.66) with continued m    Labile hypertension 11/03/2016    Myalgia due to statin 01/03/2024    Obstructive sleep apnea on CPAP 11/03/2016    Obstructive sleep apnea with intermittent CPAP therapy (did not tolerate).  --PATIENT STATES THE DOES NOT USE MACHINE ALL THE TIME    Vitamin deficiency     Chronic vitamin deficiency secondary to Crohn’s disease.          Past Surgical History:  Past Surgical History:   Procedure Laterality Date    ATRIAL APPENDAGE EXCLUSION LEFT WITH  TRANSESOPHAGEAL ECHOCARDIOGRAM N/A 11/13/2023    Procedure: Atrial Appendage Occlusion;  Surgeon: Tevin Esparza MD;  Location: [facility] INVASIVE LOCATION;  Service: Cardiology;  Laterality: N/A;    CARDIOVERSION      1/16/2023 PER DR. VICKERS    COLECTOMY PARTIAL / TOTAL  2001    with colostomy    HAND SURGERY  2004    secondary to trauma        Admitting Doctor:   Maddy Vera DO    Consulting Provider(s):  Consults       No orders found from 3/12/2024 to 4/11/2024.             Admitting Diagnosis:   The encounter diagnosis was Dyspnea, unspecified type.    Most Recent Vitals:   Vitals:    04/10/24 2121 04/10/24 2125 04/10/24 2130 04/10/24 2135   BP: (!) 150/110 (!) 153/123 (!) 153/104 (!) 158/112   Pulse: 99 94 96 98   Resp:       Temp:       TempSrc:       SpO2: 94% 95% 93% 92%   Weight:       Height:           Active LDAs/IV Access:   Lines, Drains & Airways       Active LDAs       Name Placement date Placement time Site Days    Peripheral IV 04/10/24 1346 Right Antecubital 04/10/24  1346  Antecubital  less than 1    Colostomy LLQ --  --  LLQ  --                    Labs (abnormal labs have a star):   Labs Reviewed   COMPREHENSIVE METABOLIC PANEL - Abnormal; Notable for the following components:       Result Value    Creatinine 1.43 (*)     eGFR 50.5 (*)     All other components within normal limits    Narrative:     GFR Normal >60  Chronic Kidney Disease <60  Kidney Failure <15    The GFR formula is only valid for adults with stable renal function between ages 18 and 70.   BNP (IN-HOUSE) - Abnormal; Notable for the following components:    proBNP 9,182.0 (*)     All other components within normal limits    Narrative:     This assay is used as an aid in the diagnosis of individuals suspected of having heart failure. It can be used as an aid in the diagnosis of acute decompensated heart failure (ADHF) in patients presenting with signs and symptoms of ADHF to the emergency department (ED). In  addition, NT-proBNP of <300 pg/mL indicates ADHF is not likely.    Age Range Result Interpretation  NT-proBNP Concentration (pg/mL:      <50             Positive            >450                   Gray                 300-450                    Negative             <300    50-75           Positive            >900                  Gray                300-900                  Negative            <300      >75             Positive            >1800                  Gray                300-1800                  Negative            <300   SINGLE HS TROPONIN T - Abnormal; Notable for the following components:    HS Troponin T 25 (*)     All other components within normal limits    Narrative:     High Sensitive Troponin T Reference Range:  <14.0 ng/L- Negative Female for AMI  <22.0 ng/L- Negative Male for AMI  >=14 - Abnormal Female indicating possible myocardial injury.  >=22 - Abnormal Male indicating possible myocardial injury.   Clinicians would have to utilize clinical acumen, EKG, Troponin, and serial changes to determine if it is an Acute Myocardial Infarction or myocardial injury due to an underlying chronic condition.        CBC WITH AUTO DIFFERENTIAL - Abnormal; Notable for the following components:    Neutrophil % 76.8 (*)     Lymphocyte % 11.6 (*)     Immature Grans % 0.7 (*)     Lymphocytes, Absolute 0.63 (*)     All other components within normal limits   SINGLE HS TROPONIN T - Abnormal; Notable for the following components:    HS Troponin T 22 (*)     All other components within normal limits    Narrative:     High Sensitive Troponin T Reference Range:  <14.0 ng/L- Negative Female for AMI  <22.0 ng/L- Negative Male for AMI  >=14 - Abnormal Female indicating possible myocardial injury.  >=22 - Abnormal Male indicating possible myocardial injury.   Clinicians would have to utilize clinical acumen, EKG, Troponin, and serial changes to determine if it is an Acute Myocardial Infarction or myocardial injury due to  an underlying chronic condition.        COVID-19 AND FLU A/B, NP SWAB IN TRANSPORT MEDIA 1 HR TAT - Normal    Narrative:     Fact sheet for providers: https://www.fda.gov/media/569967/download    Fact sheet for patients: https://www.fda.gov/media/261784/download    Test performed by PCR.   COVID PRE-OP / PRE-PROCEDURE SCREENING ORDER (NO ISOLATION)    Narrative:     The following orders were created for panel order COVID PRE-OP / PRE-PROCEDURE SCREENING ORDER (NO ISOLATION) - Swab, Nasopharynx.  Procedure                               Abnormality         Status                     ---------                               -----------         ------                     COVID-19 and FLU A/B PCR...[[id number]]  Normal              Final result                 Please view results for these tests on the individual orders.   RAINBOW DRAW    Narrative:     The following orders were created for panel order [place] Draw.  Procedure                               Abnormality         Status                     ---------                               -----------         ------                     Green Top (Gel)[[id number]]                                  Final result               Lavender Top[[id number]]                                     Final result               Gold Top - SST[[id number]]                                   Final result               [place] Top[[id number]]                                         Final result               Light Blue Top[[id number]]                                   Final result                 Please view results for these tests on the individual orders.   PSA SCREEN   CBC AND DIFFERENTIAL    Narrative:     The following orders were created for panel order CBC & Differential.  Procedure                               Abnormality         Status                     ---------                               -----------         ------                     CBC Auto Differential[[id number]]        Abnormal             Final result                 Please view results for these tests on the individual orders.   GREEN TOP   LAVENDER TOP   GOLD TOP - SST   GRAY TOP   LIGHT BLUE TOP       Meds Given in ED:   Medications   sodium chloride 0.9 % flush 10 mL (has no administration in time range)   niCARdipine (CARDENE) 20 mg in 200 mL NS infusion (5 mg/hr Intravenous New Bag 4/10/24 2118)   hydrALAZINE (APRESOLINE) injection 10 mg (10 mg Intravenous Given 4/10/24 1423)   ondansetron (ZOFRAN) injection 4 mg (4 mg Intravenous Given 4/10/24 1641)   iopamidol (ISOVUE-370) 76 % injection 100 mL (85 mL Intravenous Given 4/10/24 1627)   labetalol (NORMODYNE,TRANDATE) injection 10 mg (10 mg Intravenous Given 4/10/24 1707)   iopamidol (ISOVUE-300) 61 % injection 100 mL (80 mL Intravenous Given 4/10/24 1817)   prochlorperazine (COMPAZINE) injection 5 mg (5 mg Intravenous Given 4/10/24 2021)   morphine injection 2 mg (2 mg Intravenous Given 4/10/24 2108)     niCARdipine, 5-15 mg/hr, Last Rate: 5 mg/hr (04/10/24 2118)         Last NIH score:                                                          Dysphagia screening results:  Patient Factors Component (Dysphagia:Stroke or Rule-out)  Best Eye Response: 4-->(E4) spontaneous (04/10/24 1335)  Best Motor Response: 6-->(M6) obeys commands (04/10/24 1335)  Best Verbal Response: 5-->(V5) oriented (04/10/24 1335)  [place] Coma Scale Score: 15 (04/10/24 1335)     [place] Coma Scale:  No data recorded     CIWA:        Restraint Type:            Isolation Status:  No active isolations

## 2024-04-11 NOTE — THERAPY EVALUATION
Patient Name: Luis Gamboa  : 1945    MRN: [id number]                              Today's Date: 2024       Admit Date: 4/10/2024    Visit Dx:     ICD-10-CM ICD-9-CM   1. Dyspnea, unspecified type  R06.00 786.09   2. Hypertensive urgency  I16.0 401.9     Patient Active Problem List   Diagnosis    Hypertensive cardiovascular disease    Dyslipidemia    Labile hypertension    ZAY on CPAP    Hiatal hernia    Crohn's disease    Chronic anxiety    Chronic insomnia    Allergic rhinitis    Leukopenia    Atrial fibrillation, persistent    AF (atrial fibrillation)    Chest pain, atypical    Dyspnea    Elevated serum creatinine    Retroperitoneal mass    Pleural effusion    GERD without esophagitis    Hypertensive urgency     Past Medical History:   Diagnosis Date    Allergic rhinitis 2016    Intermittent    Atrial fibrillation     Chronic anxiety 2016    Chronic intermittent anxiety/remote depression.     Chronic insomnia 2016    Colostomy in place     Crohn's disease 2016    Crohn’s disease with partial colectomy and colostomy - data deficit.     DVT (deep venous thrombosis)     Remote left calf DVT/Coumadin therapy - data deficit.    Dyslipidemia 2016    Moderate    Enlarged prostate     Glaucoma     Hiatal hernia 2016    probable GERD, 2009.    Hyperlipidemia     Hypertensive cardiovascular disease 2016    Probable hypertensive cardiovascular disease: Remote progressive exertional dyspnea/chest pain syndrome with acceptable GXT and borderline abnormal acceptable Holter monitor, 2009. Recurrent progressive NYHA class III symptoms with abnormal EKG and acceptable chest x-ray with essentially normal right dominant coronary artery and preserved systolic LV dysfunction, LVEF (0.66) with continued m    Labile hypertension 2016    Myalgia due to statin 2024    Obstructive sleep apnea on CPAP 2016    Obstructive sleep apnea with intermittent  CPAP therapy (did not tolerate).  --PATIENT STATES THE DOES NOT USE MACHINE ALL THE TIME    Vitamin deficiency     Chronic vitamin deficiency secondary to Crohn’s disease.       Past Surgical History:   Procedure Laterality Date    ATRIAL APPENDAGE EXCLUSION LEFT WITH TRANSESOPHAGEAL ECHOCARDIOGRAM N/A 11/13/2023    Procedure: Atrial Appendage Occlusion;  Surgeon: Tevin Esparza MD;  Location: [facility] INVASIVE LOCATION;  Service: Cardiology;  Laterality: N/A;    CARDIOVERSION      1/16/2023 PER DR. VICKERS    COLECTOMY PARTIAL / TOTAL  2001    with colostomy    HAND SURGERY  2004    secondary to trauma      General Information       Row Name 04/11/24 1411          OT Time and Intention    Document Type evaluation  -KL     Mode of Treatment occupational therapy  -       Row Name 04/11/24 1411          General Information    Patient Profile Reviewed yes  -KL     Prior Level of Function independent:;all household mobility;community mobility;gait;transfer;bed mobility;ADL's;work  Has shower seat; SPC as needed  -     Existing Precautions/Restrictions fall  Stoma  -     Barriers to Rehab none identified  -       Row Name 04/11/24 1411          Living Environment    People in Home spouse  -[facility] Name 04/11/24 1411          Home Main Entrance    Number of Stairs, Main Entrance two  -KL     Stair Railings, Main Entrance none  -[facility] Name 04/11/24 1411          Stairs Within Home, Primary    Number of Stairs, Within Home, Primary none  -KL     Stair Railings, Within Home, Primary none  -[facility] Name 04/11/24 1411          Cognition    Orientation Status (Cognition) oriented x 4  -[facility] Name 04/11/24 1411          Safety Issues, Functional Mobility    Impairments Affecting Function (Mobility) balance;endurance/activity tolerance;pain  -KL               User Key  (r) = Recorded By, (t) = Taken By, (c) = Cosigned By      Initials Name Provider Type    KL Alma Perez OT Occupational  Therapist                     Mobility/ADL's       [facility] 04/11/24 1412          Transfers    Transfers stand-sit transfer;toilet transfer  -KL       Row Name 04/11/24 1412          Bed-Chair Transfer    Bed-Chair [place] (Transfers) contact guard  -KL       Row Name 04/11/24 1412          Sit-Stand Transfer    Sit-Stand [place] (Transfers) contact guard  -KL       Row Name 04/11/24 1412          Stand-Sit Transfer    Stand-Sit [place] (Transfers) supervision  -     Assistive Device (Stand-Sit Transfers) other (see comments)  None  -[facility] Name 04/11/24 1412          Toilet Transfer    Type (Toilet Transfer) sit-stand;stand-sit  -     [place] Level (Toilet Transfer) supervision  -     Assistive Device (Toilet Transfer) other (see comments)  None  -KL       Row Name 04/11/24 1412          Functional Mobility    Functional Mobility- Ind. Level contact guard assist  -     Functional Mobility-Distance (Feet) --  HH distances  -KL       Row Name 04/11/24 1412          Activities of Daily Living    BADL Assessment/Intervention toileting  -KL       Row Name 04/11/24 1412          Toileting Assessment/Training    [place] Level (Toileting) adjust/manage clothing;supervision  -     Position (Toileting) supported sitting  -               User Key  (r) = Recorded By, (t) = Taken By, (c) = Cosigned By      Initials Name Provider Type    Alma Farrar OT Occupational Therapist                   Obj/Interventions       Row Name 04/11/24 1415          Sensory Assessment (Somatosensory)    Sensory Assessment (Somatosensory) UE sensation intact  -KL       Row Name 04/11/24 1415          Range of Motion Comprehensive    General Range of Motion bilateral upper extremity ROM WFL  -KL       Row Name 04/11/24 1415          Strength Comprehensive (MMT)    Comment, General Manual Muscle Testing (MMT) Assessment BUE MMT grossly 4+/5  -KL       Row Name 04/11/24 1415          Balance    Static  Sitting Balance independent  -KL     Dynamic Sitting Balance independent  -KL     Position, Sitting Balance unsupported;sitting edge of bed  -KL     Static Standing Balance supervision  -KL     Dynamic Standing Balance contact guard  -KL     Position/Device Used, Standing Balance supported;walker, front-wheeled  -KL     Balance Interventions sitting;standing;sit to stand;supported;static;dynamic;occupation based/functional task  -KL               User Key  (r) = Recorded By, (t) = Taken By, (c) = Cosigned By      Initials Name Provider Type    Alma Farrar OT Occupational Therapist                   Goals/Plan       Row Name 04/11/24 1430          Transfer Goal 1 (OT)    Activity/Assistive Device (Transfer Goal 1, OT) sit-to-stand/stand-to-sit;toilet  -     [place] Level/Cues Needed (Transfer Goal 1, OT) modified independence  -KL     Time Frame (Transfer Goal 1, OT) long term goal (LTG);10 days  -KL     Progress/Outcome (Transfer Goal 1, OT) new goal  -       Row Name 04/11/24 1430          Dressing Goal 1 (OT)    Activity/Device (Dressing Goal 1, OT) lower body dressing  -     [place]/Cues Needed (Dressing Goal 1, OT) modified independence  -KL     Time Frame (Dressing Goal 1, OT) long term goal (LTG);10 days  -KL     Progress/Outcome (Dressing Goal 1, OT) new goal  -       Row Name 04/11/24 1430          Toileting Goal 1 (OT)    Activity/Device (Toileting Goal 1, OT) adjust/manage clothing;perform perineal hygiene  -     [place] Level/Cues Needed (Toileting Goal 1, OT) independent  -KL     Time Frame (Toileting Goal 1, OT) long term goal (LTG);10 days  -KL     Progress/Outcome (Toileting Goal 1, OT) new goal  -       Row Name 04/11/24 1430          Therapy Assessment/Plan (OT)    Planned Therapy Interventions (OT) activity tolerance training;functional balance retraining;transfer/mobility retraining;strengthening exercise;ROM/therapeutic exercise;occupation/activity based  interventions;patient/caregiver education/training  -               User Key  (r) = Recorded By, (t) = Taken By, (c) = Cosigned By      Initials Name Provider Type    Alma Farrar, TEAGAN Occupational Therapist                   Clinical Impression       [facility] Name 04/11/24 1424          Pain Assessment    Pretreatment Pain Rating 4/10  -     Posttreatment Pain Rating 6/10  -     Pain Location - back  -     Pain Intervention(s) Repositioned;Ambulation/increased activity  -       Row Name 04/11/24 1424          Plan of Care Review    Plan of Care Reviewed With patient;spouse  -     Progress no change  -     Outcome Evaluation Pt presents to OT evaluation near baseline function w/ minor deficits in functional endurance and balance. Pt would benefit from IPOT to address deficits in order to progress towards PLOF. d/c rec is home w/ assist as needed.  -[facility] Name 04/11/24 1424          Therapy Assessment/Plan (OT)    Patient/Family Therapy Goal Statement (OT) Return to PLOF  -     Rehab Potential (OT) good, to achieve stated therapy goals  -     Criteria for Skilled Therapeutic Interventions Met (OT) yes  -     Therapy Frequency (OT) daily  -[facility] Name 04/11/24 1424          Therapy Plan Review/Discharge Plan (OT)    Anticipated Discharge Disposition (OT) home with assist  -[facility] Name 04/11/24 1424          Vital Signs    Pre Systolic BP Rehab 167  -KL     Pre Treatment Diastolic   -KL     Post Systolic BP Rehab 189  -KL     Post Treatment Diastolic   -KL     Pretreatment Heart Rate (beats/min) 79  -KL     Posttreatment Heart Rate (beats/min) 81  -KL     Pre SpO2 (%) 95  -KL     O2 Delivery Pre Treatment room air  -KL     Post SpO2 (%) 96  -KL     O2 Delivery Post Treatment room air  -KL     Pre Patient Position Sitting  -KL     Intra Patient Position Standing  -     Post Patient Position Sitting  -       Row Name 04/11/24 1424          Positioning and Restraints     Pre-Treatment Position in bed  -KL     Post Treatment Position chair  -KL     In Chair notified nsg;reclined;sitting;call light within reach;encouraged to call for assist;exit alarm on;with family/caregiver;waffle cushion;legs elevated  -               User Key  (r) = Recorded By, (t) = Taken By, (c) = Cosigned By      Initials Name Provider Type     Alma Perez OT Occupational Therapist                   Outcome Measures       Row Name 04/11/24 1431          How much help from another is currently needed...    Putting on and taking off regular lower body clothing? 3  -KL     Bathing (including washing, rinsing, and drying) 3  -KL     Toileting (which includes using toilet bed pan or urinal) 4  -KL     Putting on and taking off regular upper body clothing 4  -KL     Taking care of personal grooming (such as brushing teeth) 4  -KL     Eating meals 4  -KL     AM-PAC 6 Clicks Score (OT) 22  -KL       Row Name 04/11/24 1202 04/11/24 0800       How much help from another person do you currently need...    Turning from your back to your side while in flat bed without using bedrails? 4  -TIERRA 3  -CB    Moving from lying on back to sitting on the side of a flat bed without bedrails? 3  -TIERRA 3  -CB    Moving to and from a bed to a chair (including a wheelchair)? 3  -TIERRA 3  -CB    Standing up from a chair using your arms (e.g., wheelchair, bedside chair)? 3  -TIERRA 3  -CB    Climbing 3-5 steps with a railing? 3  -TIERRA 3  -CB    To walk in hospital room? 3  -TIERRA 3  -CB    AM-PAC 6 Clicks Score (PT) 19  -TIERRA 18  -CB    Highest Level of Mobility Goal 6 --> Walk 10 steps or more  -TIERRA 6 --> Walk 10 steps or more  -CB      Row Name 04/11/24 1431          Functional Assessment    Outcome Measure Options AM-PAC 6 Clicks Daily Activity (OT)  -               User Key  (r) = Recorded By, (t) = Taken By, (c) = Cosigned By      Initials Name Provider Type    Jacqueline Lopez, PT Physical Therapist    Eloy Harp, RN  Registered Nurse    Alma Farrar OT Occupational Therapist                    Occupational Therapy Education       Title: PT OT SLP Therapies (In Progress)       Topic: Occupational Therapy (In Progress)       Point: ADL training (Done)       Description:   Instruct learner(s) on proper safety adaptation and remediation techniques during self care or transfers.   Instruct in proper use of assistive devices.                  Learning Progress Summary             Patient Acceptance, E, VU,NR by CHRISTOPHER at 4/11/2024 1432   Significant Other Acceptance, E, VU,NR by CHRISTOPHER at 4/11/2024 1432                         Point: Home exercise program (Not Started)       Description:   Instruct learner(s) on appropriate technique for monitoring, assisting and/or progressing therapeutic exercises/activities.                  Learner Progress:  Not documented in this visit.              Point: Precautions (Done)       Description:   Instruct learner(s) on prescribed precautions during self-care and functional transfers.                  Learning Progress Summary             Patient Acceptance, E, VU,NR by CHRISTOPHER at 4/11/2024 1432   Significant Other Acceptance, E, VU,NR by CHRISTOPHER at 4/11/2024 1432                         Point: Body mechanics (Done)       Description:   Instruct learner(s) on proper positioning and spine alignment during self-care, functional mobility activities and/or exercises.                  Learning Progress Summary             Patient Acceptance, E, VU,NR by CHRISTOPHER at 4/11/2024 1432   Significant Other Acceptance, E, VU,NR by CHRISTOPHER at 4/11/2024 1432                                         User Key       Initials Effective Dates Name Provider Type Discipline    CHRISTOPHER 02/05/24 -  Alma Perez OT Occupational Therapist OT                  OT Recommendation and Plan  Planned Therapy Interventions (OT): activity tolerance training, functional balance retraining, transfer/mobility retraining, strengthening exercise, ROM/therapeutic  exercise, occupation/activity based interventions, patient/caregiver education/training  Therapy Frequency (OT): daily  Plan of Care Review  Plan of Care Reviewed With: patient, spouse  Progress: no change  Outcome Evaluation: Pt presents to OT evaluation near baseline function w/ minor deficits in functional endurance and balance. Pt would benefit from IPOT to address deficits in order to progress towards PLOF. d/c rec is home w/ assist as needed.     Time Calculation:   Evaluation Complexity (OT)  Review Occupational Profile/Medical/Therapy History Complexity: brief/low complexity  Assessment, Occupational Performance/Identification of Deficit Complexity: 1-3 performance deficits  Clinical Decision Making Complexity (OT): problem focused assessment/low complexity  Overall Complexity of Evaluation (OT): low complexity     Time Calculation- OT       Row Name 04/11/24 1432             Time Calculation- OT    OT Start Time 1030  -KL      OT Received On 04/11/24  -KL      OT Goal Re-Cert Due Date 04/21/24  -KL         Timed Charges    92580 - OT Self Care/Mgmt Minutes 10  -KL         Untimed Charges    OT Eval/Re-eval Minutes 37  -KL         Total Minutes    Timed Charges Total Minutes 10  -KL      Untimed Charges Total Minutes 37  -KL       Total Minutes 47  -KL                User Key  (r) = Recorded By, (t) = Taken By, (c) = Cosigned By      Initials Name Provider Type    Alma Farrar OT Occupational Therapist                  Therapy Charges for Today       Code Description Service Date Service Provider Modifiers Qty    [id number]  OT SELF CARE/MGMT/TRAIN EA 15 MIN 4/11/2024 Alma Perez OT GO 1    [id number] HC OT EVAL LOW COMPLEXITY 3 4/11/2024 Alma Perez OT GO 1                 Alma Perez OT  4/11/2024

## 2024-04-11 NOTE — PLAN OF CARE
Goal Outcome Evaluation:  Plan of Care Reviewed With: patient, spouse        Progress: no change  Outcome Evaluation: Pt presents to OT evaluation near baseline function w/ minor deficits in functional endurance and balance. Pt would benefit from IPOT to address deficits in order to progress towards PLOF. d/c rec is home w/ assist as needed.      Anticipated Discharge Disposition (OT): home with assist

## 2024-04-11 NOTE — H&P
[facility] Medicine Services  HISTORY AND PHYSICAL    Patient Name: Luis Gamboa  : 1945  MRN: [id number]  Primary Care Physician: Anand De Souza MD  Date of admission: 4/10/2024    Subjective   Subjective     Chief Complaint:  Shortness of air    HPI:  Luis Gamboa is a 78 y.o. male with a history of anxiety, depression, hypertension, hyperlipidemia, DVT, Crohn's disease s/p partial colectomy and colostomy, A-fib, ZAY on CPAP, presents to the ED with complaints of shortness of air, and generalized weakness.  Patient reports worsening shortness of air over the last 2-3 weeks.  His dyspnea is worse with exertion or when lying flat.  He has been having nausea over this time period.  He has constipation since being on pain medication.  He endorses loss of appetite, less active, fatigue, mild nonproductive cough, wheezing, abdominal pain around his stoma, abdominal distention, nausea, urinary frequency, dizziness, light-headedness, headaches, and generalized weakness.  He stopped taking his pain medications last week due to constipation.  No fevers, chest pain, edema, vomiting, dysuria, or any other complaints at this time.  Wife feels that a lot of his problems started after taking statin which he is no longer on.  CTA chest shows no pulmonary embolism, left retroperitoneal soft tissue mass measuring 3.6 x 4.8 cm that may have slightly increased and compared to previous study 2019.  CT abdomen pelvis shows enlarged left para-aortic lymph node compatible with metastatic disease, mild basilar atelectasis and trace pleural effusions, small nodule at the base of the prostate versus base of the bladder and diffusely trabeculated bladder wall.  Patient reports that he follows with [facility] and was towed that he is at abdominal mass was not cancerous.  He has not been scanned in over a couple of years.  His blood pressure upon arrival was 198/140.  Patient  was started on a Cardene drip in the ED, and is being admitted to the hospitalist for further evaluation management.        Review of Systems   Constitutional:  Positive for activity change, appetite change and fatigue. Negative for chills, fever and unexpected weight change.   HENT:  Positive for congestion. Negative for rhinorrhea, sore throat and trouble swallowing.    Eyes: Negative.    Respiratory:  Positive for cough, shortness of breath and wheezing.    Cardiovascular:  Negative for chest pain, palpitations and leg swelling.   Gastrointestinal:  Positive for abdominal distention, abdominal pain (soreness around stoma), constipation (LBM 4/10) and nausea. Negative for blood in stool, diarrhea and vomiting.   Endocrine: Negative.    Genitourinary:  Positive for frequency. Negative for decreased urine volume, difficulty urinating, dysuria, flank pain, hematuria, penile pain, penile swelling, scrotal swelling, testicular pain and urgency.   Musculoskeletal:  Positive for back pain. Negative for arthralgias, joint swelling, myalgias, neck pain and neck stiffness.   Skin: Negative.    Allergic/Immunologic: Negative.    Neurological:  Positive for dizziness, weakness, light-headedness and headaches. Negative for syncope, speech difficulty and numbness.   Hematological: Negative.    Psychiatric/Behavioral: Negative.                  Personal History     Past Medical History:   Diagnosis Date    Allergic rhinitis 11/03/2016    Intermittent    Atrial fibrillation     Chronic anxiety 11/03/2016    Chronic intermittent anxiety/remote depression.     Chronic insomnia 11/03/2016    Colostomy in place     Crohn's disease 11/03/2016    Crohn’s disease with partial colectomy and colostomy - data deficit.     DVT (deep venous thrombosis)     Remote left calf DVT/Coumadin therapy - data deficit.    Dyslipidemia 11/03/2016    Moderate    Enlarged prostate     Glaucoma     Hiatal hernia 11/03/2016    probable GERD, April 2009.     Hyperlipidemia     Hypertensive cardiovascular disease 11/03/2016    Probable hypertensive cardiovascular disease: Remote progressive exertional dyspnea/chest pain syndrome with acceptable GXT and borderline abnormal acceptable Holter monitor, April 2009. Recurrent progressive NYHA class III symptoms with abnormal EKG and acceptable chest x-ray with essentially normal right dominant coronary artery and preserved systolic LV dysfunction, LVEF (0.66) with continued m    Labile hypertension 11/03/2016    Myalgia due to statin 01/03/2024    Obstructive sleep apnea on CPAP 11/03/2016    Obstructive sleep apnea with intermittent CPAP therapy (did not tolerate).  --PATIENT STATES THE DOES NOT USE MACHINE ALL THE TIME    Vitamin deficiency     Chronic vitamin deficiency secondary to Crohn’s disease.               Past Surgical History:   Procedure Laterality Date    ATRIAL APPENDAGE EXCLUSION LEFT WITH TRANSESOPHAGEAL ECHOCARDIOGRAM N/A 11/13/2023    Procedure: Atrial Appendage Occlusion;  Surgeon: Tevin Esparza MD;  Location: [facility] INVASIVE LOC[facility];  Service: Cardiology;  Laterality: N/A;    CARDIOVERSION      1/16/2023 PER DR. VICKERS    COLECTOMY PARTIAL / TOTAL  2001    with colostomy    HAND SURGERY  2004    secondary to trauma       Family History:  family history includes No Known Problems in his father and mother.     Social History:  reports that he quit smoking about 42 years ago. His smoking use included cigarettes. He has never been exposed to tobacco smoke. He has never used smokeless tobacco. He reports that he does not drink alcohol and does not use drugs.  Social History     Social History Narrative    Not on file       Medications:  Budesonide, Evolocumab with Infusor, aspirin, clopidogrel, diphenoxylate-atropine, finasteride, fish oil, immune globulin (human), latanoprost, magnesium oxide, multivitamin with minerals, nebivolol, nitroglycerin, ondansetron ODT, pantoprazole, sertraline, tamsulosin,  vitamin C, and zolpidem    Allergies   Allergen Reactions    Crestor [Rosuvastatin] Myalgia    Imuran [Azathioprine] GI Intolerance      hemorrhagic urticaria.  Bleeding        Objective   Objective     Vital Signs:   Temp:  [97 °F (36.1 °C)-97.9 °F (36.6 °C)] 97.9 °F (36.6 °C)  Heart Rate:  [] 93  Resp:  [17] 17  BP: (128-198)/() 128/87    Physical Exam  Constitutional:       General: He is not in acute distress.     Appearance: He is not toxic-appearing or diaphoretic.   HENT:      Head: Normocephalic.      Nose: Nose normal.      Mouth/Throat:      Mouth: Mucous membranes are moist.   Eyes:      Pupils: Pupils are equal, round, and reactive to light.   Cardiovascular:      Rate and Rhythm: Normal rate. Rhythm irregular.      Pulses: Normal pulses.      Heart sounds: No murmur heard.     No friction rub. No gallop.      Comments: Irregularly irregular  Pulmonary:      Effort: Pulmonary effort is normal. No respiratory distress.      Breath sounds: Normal breath sounds. No stridor. No wheezing, rhonchi or rales.   Chest:      Chest wall: No tenderness.   Abdominal:      General: Bowel sounds are normal.      Palpations: Abdomen is soft.      Tenderness: There is abdominal tenderness (mild diffuse tenderness). There is no guarding or rebound.   Musculoskeletal:         General: No swelling or tenderness. Normal range of motion.      Cervical back: Normal range of motion.   Skin:     General: Skin is warm and dry.      Capillary Refill: Capillary refill takes less than 2 seconds.      Coloration: Skin is not jaundiced or pale.      Findings: No bruising, erythema, lesion or rash.   Neurological:      Mental Status: He is alert and oriented to person, place, and time.      Cranial Nerves: No cranial nerve deficit.      Sensory: No sensory deficit.      Motor: No weakness.   Psychiatric:         Mood and Affect: Mood normal.         Behavior: Behavior normal.         Thought Content: Thought content normal.          Judgment: Judgment normal.            Result Review:  I have personally reviewed the results from the time of this admission to 4/10/2024 22:27 EDT and agree with these findings:  [x]  Laboratory list / accordion  [x]  Microbiology  [x]  Radiology  [x]  EKG/Telemetry   []  Cardiology/Vascular   []  Pathology  [x]  Old records  []  Other:  Most notable findings include:     LAB RESULTS:      Lab 04/10/24  1346   WBC 5.42   HEMOGLOBIN 15.8   HEMATOCRIT 46.2   PLATELETS 212   NEUTROS ABS 4.16   IMMATURE GRANS (ABS) 0.04   LYMPHS ABS 0.63*   MONOS ABS 0.50   EOS ABS 0.06   MCV 96.5         Lab 04/10/24  1346   SODIUM 139   POTASSIUM 4.1   CHLORIDE 100   CO2 28.0   ANION GAP 11.0   BUN 19   CREATININE 1.43*   EGFR 50.5*   GLUCOSE 97   CALCIUM 9.5         Lab 04/10/24  1346   TOTAL PROTEIN 7.3   ALBUMIN 4.3   GLOBULIN 3.0   ALT (SGPT) 16   AST (SGOT) 26   BILIRUBIN 0.8   ALK PHOS 88         Lab 04/10/24  1702 04/10/24  1346   PROBNP  --  9,182.0*   HSTROP T 22* 25*                 Brief Urine Lab Results       None          Microbiology Results (last 10 days)       Procedure Component Value - Date/Time    COVID PRE-OP / PRE-PROCEDURE SCREENING ORDER (NO ISOLATION) - Swab, Nasopharynx [[id number]]  (Normal) Collected: 04/10/24 1746    Lab Status: Final result Specimen: Swab from Nasopharynx Updated: 04/10/24 1814    Narrative:      The following orders were created for panel order COVID PRE-OP / PRE-PROCEDURE SCREENING ORDER (NO ISOLATION) - Swab, Nasopharynx.  Procedure                               Abnormality         Status                     ---------                               -----------         ------                     COVID-19 and FLU A/B PCR...[[id number]]  Normal              Final result                 Please view results for these tests on the individual orders.    COVID-19 and FLU A/B PCR, 1 HR TAT - Swab, Nasopharynx [[id number]]  (Normal) Collected: 04/10/24 1746    Lab Status: Final result  Specimen: Swab from Nasopharynx Updated: 04/10/24 1814     COVID19 Not Detected     Influenza A PCR Not Detected     Influenza B PCR Not Detected    Narrative:      Fact sheet for providers: https://www.fda.gov/media/577829/download    Fact sheet for patients: https://www.fda.gov/media/068173/download    Test performed by PCR.            CT Abdomen Pelvis With Contrast    Result Date: 4/10/2024  CT ABDOMEN PELVIS W CONTRAST Date of Exam: 4/10/2024 6:16 PM EDT Indication: nausea. Comparison: None available. Technique: Axial CT images were obtained of the abdomen and pelvis following the uneventful intravenous administration of 80 mL of Isovue-370 intravenous contrast. Reconstructed coronal and sagittal images were also obtained. Automated exposure control and iterative construction methods were used. Findings: Lung Bases:  Mild bibasilar atelectasis or scarring and trace pleural effusions Liver:Liver is normal in size and CT density. No focal lesions. Biliary/Gallbladder: The gallbladder is without evidence of radiopaque stones. The biliary tree is nondilated. Spleen:Spleen is normal in size and CT density. Pancreas: Pancreas shows homogeneous density. There is no evidence of pancreatic mass or peripancreatic fluid. Kidneys: Kidneys are normal in size. Nonobstructive right renal stone. There is no hydronephrosis. There are few simple appearing left renal cysts. Small parapelvic right renal cyst Adrenals: Adrenal glands are unremarkable. Retroperitoneal/Lymph Nodes/Vasculature: There is a 4.8 cm pathologically enlarged left para-aortic lymph node. Additional subcentimeter retroperitoneal lymph nodes are seen Gastrointestinal/Mesentery: The stomach and small bowel loops are normal in caliber. Surgical changes are seen status post left hemicolectomy with a left lower quadrant colostomy. Mildly prominent base of the prostate with a small nodule measuring 1.2 cm versus small mass at the base of the bladder. Diffuse  bladder wall thickening with trabeculated bladder wall   Bony Structures:  Visualized bony structures are consistent with the patient's age.     Impression: Impression: 1. Left hemicolectomy changes with a left lower quadrant colostomy 2. Pathologically enlarged left para-aortic lymph node compatible with metastatic disease in this clinical setting 3. Mild basilar atelectasis and trace pleural effusions 4. Small nodule at the base of the prostate versus base of the bladder and diffusely trabeculated bladder wall. Correlation with a bladder ultrasound recommended Electronically Signed: Yovany Wilks MD  4/10/2024 7:09 PM EDT  Workstation ID: [id number]    CT Angiogram Chest Pulmonary Embolism    Result Date: 4/10/2024  CT ANGIOGRAM CHEST PULMONARY EMBOLISM Date of Exam: 4/10/2024 4:07 PM EDT Indication: h/o afib s/p watchmen procedure soa. Comparison: None available. Technique: Axial CT images were obtained of the chest after the uneventful intravenous administration of Isovue-370, 85 mL utilizing pulmonary embolism protocol.  Reconstructed coronal and sagittal images were also obtained. Automated exposure control and iterative construction methods were used. Findings: Pulmonary arteries: Adequate opacification of the pulmonary arteries. No evidence of acute pulmonary embolism. Thoracic aorta: The thoracic aorta is not opacified with contrast due to bolus timing. Thoracic aorta is normal in caliber and contour. Small scattered atheromatous calcifications are visualized. Thyroid: The visualized portion of the thyroid is unremarkable. Lungs and Pleura: Mild bilateral dependent atelectasis visualized. Small right pleural effusion is visualized. No suspicious pulmonary nodules. No pneumothorax. Mediastinum/Leigha: No mediastinal or hilar lymphadenopathy. Lymph nodes: No axillary or supraclavicular lymphadenopathy. Cardiovascular: The heart is normal in size.No evidence of pericardial effusion. Patient is post watchman  procedure. Small coronary calcifications are visualized. Upper Abdomen: There is a left retroperitoneal soft tissue mass abutting the abdominal aorta measuring 3.6 x 4.8 cm. Evaluation is incomplete on this chest CTA however, lesion may have slightly increased in size when compared to prior CT performed on September 24, 2019. Bones and Soft Tissue: No acute fracture, aggressive osseous lesions, or soft tissue process.     Impression: Impression: No evidence of pulmonary embolism. Post watchman procedure. Small right pleural effusion. Again visualized is a left retroperitoneal soft tissue mass measuring 3.6 x 4.8 cm. Evaluation is incomplete on this chest CTA however, lesion may have slightly increased in size when compared to prior abdominal CT performed on September 24, 2019 (previously measured 3.2 x 4.2 cm). If not already performed, consider evaluation by means of percutaneous tissue sampling as clinically warranted. Electronically Signed: Prem Kevin MD  4/10/2024 4:41 PM EDT  Workstation ID: [id number]    XR Chest 1 View    Result Date: 4/10/2024  XR CHEST 1 VW Date of Exam: 4/10/2024 1:29 PM EDT Indication: SOA triage protocol Comparison: 6/12/2010 Findings: Heart shadow is in the range of normal size. Pulmonary vasculature appears upper limits of normal. There appears to be a left atrial appendage exclusion device in place. There is a suggestion of a small right pleural effusion, however, blunting of the lateral costophrenic angle is also present in 2010 and this may represent scarring instead. No new pulmonary parenchymal disease is seen. There is no evidence of pneumothorax.     Impression: Impression: 1. Heart shadow in the upper range of normal size. Mild pulmonary venous hypertension. No evidence of pulmonary edema. 2. Blunting of the right lateral costophrenic angle, but unchanged from 2010. Consider small recurrent pleural effusion, or chronic and stable mild right basilar pleural scarring. 3. No  other evidence of active chest pathology. Electronically Signed: Navdeep Rodriguez MD  4/10/2024 1:46 PM EDT  Workstation ID: [id number]     Results for orders placed during the hospital encounter of 01/03/24    Adult Transesophageal Echo 3D (ARCHIE) W/ Cont If Necessary Per Protocol    Interpretation Summary    27 mm Watchman FLX atrial appendage occlusion device in place.  There is a small jet of periprosthetic flow measuring proximately 2-3 mm.  This is consistent with adequate left atrial appendage occlusion.    Left ventricular ejection fraction appears to be 51 - 55%.    Mild to moderate mitral valve regurgitation is present.    There is severe, (grade 4) plaque in the aortic arch present.      Assessment & Plan   Assessment & Plan       Dyspnea    Dyslipidemia    ZAY on CPAP    Atrial fibrillation, persistent    Elevated serum creatinine    Retroperitoneal mass    Pleural effusion    GERD without esophagitis    Luis Gamboa is a 78 y.o. male with a history of anxiety, depression, hypertension, hyperlipidemia, DVT, Crohn's disease s/p partial colectomy and colostomy, A-fib, ZAY on CPAP, presents to the ED with complaints of shortness of air, and generalized weakness.      Assessment and Plan:    Hypertensive urgency  A-fib s/p Watchman device  Hyperlipidemia  -- Continue nebivolol--missed dose today  -- cardene drip  -- consult cardiology in the am    Elevated serum creatinine  -- Creatinine 1.43  -- baseline creatinine ~ 1.1-1.2  -- urine studies  -- bmp in the am    Left retroperitoneal mass  Enlarged para-aortic lymph node   --CTA chest shows left retroperitoneal soft tissue mass measuring 3.6 x 4.8 cm, lesion may have slightly increased in size when compared to prior CT abdomen September 2019  -- CT abdomen pelvis shows pathologically enlarged left para-aortic lymph node compatible with metastatic disease  -- had biopsy November 2019 at [facility]--12/19/19 path shows Schwannoma, patient chose to  continue with observation.  Last imaging at  was 12/17/21 showed no growth  -- follow up with Dr. Ponce at     Prostate versus bladder nodule  --CT abdomen pelvis shows a small nodule at the base of the prostate versus base of the bladder and diffusely trabeculated bladder wall  -- Bladder ultrasound  -- PSA    Right pleural effusion  -- CTA chest shows small right pleural effusion  -- monitor    Left L5 radiculopathy secondary to disc protrusion  -- Follows with Dr. Dillard  -- Gabapentin 300 mg 3 times a day  -- Consult PT  -- Per Dr. Dillard's note from earlier today if patient not improving strongly suggest L4-5 discectomy    ZAY  -- CPAP    GERD  -- PPI    Crohn's disease with partial colectomy and colostomy  -- Follows with Dr. Lester    Remote left lower extremity DVT    BPH  -- Continue Flomax    Anxiety and depression  -- Continue Zoloft    Polymyalgia rheumatica  -- Follows with rheumatology    Generalized weakness  -- fall precautions  -- pt/ot consult    DVT prophylaxis:  heparin    CODE STATUS:    Level Of Support Discussed With: Patient  Code Status (Patient has no pulse and is not breathing): CPR (Attempt to Resuscitate)  Medical Interventions (Patient has pulse or is breathing): Full Support      Expected Discharge  TBD  Expected discharge date/ time has not been documented.      This note has been completed as part of a split-shared workflow.     Signature: Electronically signed by ROSALBA Anderson, 04/10/24, 10:27 PM EDT.      Attending Juan Francisco FRANCIS supervised care of the patient on day of service with direct care provided by the advanced care provider (APC).    Maddy Vera,   04/11/24

## 2024-04-11 NOTE — CASE MANAGEMENT/SOCIAL WORK
Discharge Planning Assessment  [facility]     Patient Name: Luis Gamboa  MRN: [id number]  Today's Date: 4/11/2024    Admit Date: 4/10/2024    Plan: IDP   Discharge Needs Assessment       Row Name 04/11/24 1027       Living Environment    People in Home spouse    Name(s) of People in Home Jayla    Current Living Arrangements home    Potentially Unsafe Housing Conditions unable to assess    In the past 12 months has the electric, gas, oil, or water company threatened to shut off services in your home? No    Primary Care Provided by self    Provides Primary Care For no one    Family Caregiver if Needed spouse    Family Caregiver Names Jayla    Quality of Family Relationships supportive;involved;helpful    Able to Return to Prior Arrangements yes       Resource/Environmental Concerns    Resource/Environmental Concerns none    Transportation Concerns none       Transportation Needs    In the past 12 months, has lack of transportation kept you from medical appointments or from getting medications? no    In the past 12 months, has lack of transportation kept you from meetings, work, or from getting things needed for daily living? No       Food Insecurity    Within the past 12 months, you worried that your food would run out before you got the money to buy more. Never true    Within the past 12 months, the food you bought just didn't last and you didn't have money to get more. Never true       Transition Planning    Patient/Family Anticipates Transition to home    Patient/Family Anticipated Services at Transition none    Transportation Anticipated family or friend will provide       Discharge Needs Assessment    Readmission Within the Last 30 Days no previous admission in last 30 days    Equipment Currently Used at Home cane, straight    Concerns to be Addressed no discharge needs identified    Anticipated Changes Related to Illness none    Equipment Needed After Discharge none                   Discharge Plan        Row Name 04/11/24 1029       Plan    Plan IDP    Patient/Family in Agreement with Plan yes    Plan Comments Spoke with patient at bedside for IDP. Lives in house in [place] with spouse. Ind. Cane and CPAP. Will begin OPPT for back soon, unsure of company at this time. PCP Anand De Souza. Insurance verified as [employer] and [facility]. [facility] and [facility]. Plan is home with spouse to transport. CM will cont to follow.    Final Discharge Disposition Code 01 - home or self-care                  Continued Care and Services - Admitted Since 4/10/2024    No active coordination exists for this encounter.       Expected Discharge Date and Time       Expected Discharge Date Expected Discharge Time    Apr 12, 2024            Demographic Summary       Row Name 04/11/24 1026       General Information    Admission Type observation    Arrived From emergency department    Referral Source admission list    Reason for Consult discharge planning    Preferred Language English                   Functional Status       Row Name 04/11/24 1026       Functional Status    Usual Activity Tolerance moderate    Current Activity Tolerance moderate       Physical Activity    On average, how many days per week do you engage in moderate to strenuous exercise (like a brisk walk)? 0 days    On average, how many minutes do you engage in exercise at this level? 0 min    Number of minutes of exercise per week 0       Functional Status, IADL    Medications independent    Meal Preparation independent    Housekeeping independent    Laundry independent    Shopping independent                   Psychosocial    No documentation.                  Abuse/Neglect    No documentation.                  Legal    No documentation.                  Substance Abuse    No documentation.                  Patient Forms    No documentation.                     Rachel Arcos RN

## 2024-04-11 NOTE — PLAN OF CARE
Goal Outcome Evaluation:  Plan of Care Reviewed With: patient        Progress: improving  Outcome Evaluation: Pt alert and oriented x4. VSS on room air. BP has decreased, Cardene gtt stopped. Pt has rested well in bed throughout shift. Up to restroom with assist x1. PRN pain medication ordered.No needs voiced at this time, call light and personal items within reach. Spouse at bedside.

## 2024-04-12 VITALS
TEMPERATURE: 98.4 F | WEIGHT: 188.4 LBS | RESPIRATION RATE: 18 BRPM | BODY MASS INDEX: 26.97 KG/M2 | SYSTOLIC BLOOD PRESSURE: 157 MMHG | DIASTOLIC BLOOD PRESSURE: 107 MMHG | HEART RATE: 82 BPM | OXYGEN SATURATION: 95 % | HEIGHT: 70 IN

## 2024-04-12 LAB
ALBUMIN SERPL-MCNC: 3.6 G/DL (ref 3.5–5.2)
ALBUMIN/GLOB SERPL: 1.6 G/DL
ALP SERPL-CCNC: 68 U/L (ref 39–117)
ALT SERPL W P-5'-P-CCNC: 12 U/L (ref 1–41)
ANION GAP SERPL CALCULATED.3IONS-SCNC: 6 MMOL/L (ref 5–15)
AST SERPL-CCNC: 23 U/L (ref 1–40)
BILIRUB SERPL-MCNC: 0.5 MG/DL (ref 0–1.2)
BUN SERPL-MCNC: 19 MG/DL (ref 8–23)
BUN/CREAT SERPL: 13.9 (ref 7–25)
CALCIUM SPEC-SCNC: 8.5 MG/DL (ref 8.6–10.5)
CHLORIDE SERPL-SCNC: 104 MMOL/L (ref 98–107)
CO2 SERPL-SCNC: 29 MMOL/L (ref 22–29)
CREAT SERPL-MCNC: 1.37 MG/DL (ref 0.76–1.27)
DEPRECATED RDW RBC AUTO: 48.7 FL (ref 37–54)
EGFRCR SERPLBLD CKD-EPI 2021: 53.1 ML/MIN/1.73
ERYTHROCYTE [DISTWIDTH] IN BLOOD BY AUTOMATED COUNT: 13.3 % (ref 12.3–15.4)
GLOBULIN UR ELPH-MCNC: 2.3 GM/DL
GLUCOSE SERPL-MCNC: 96 MG/DL (ref 65–99)
HCT VFR BLD AUTO: 41.2 % (ref 37.5–51)
HGB BLD-MCNC: 13.8 G/DL (ref 13–17.7)
MCH RBC QN AUTO: 33.5 PG (ref 26.6–33)
MCHC RBC AUTO-ENTMCNC: 33.5 G/DL (ref 31.5–35.7)
MCV RBC AUTO: 100 FL (ref 79–97)
PLATELET # BLD AUTO: 145 10*3/MM3 (ref 140–450)
PMV BLD AUTO: 9.7 FL (ref 6–12)
POTASSIUM SERPL-SCNC: 4.1 MMOL/L (ref 3.5–5.2)
PROT SERPL-MCNC: 5.9 G/DL (ref 6–8.5)
RBC # BLD AUTO: 4.12 10*6/MM3 (ref 4.14–5.8)
SODIUM SERPL-SCNC: 139 MMOL/L (ref 136–145)
WBC NRBC COR # BLD AUTO: 3.63 10*3/MM3 (ref 3.4–10.8)

## 2024-04-12 PROCEDURE — 25010000002 HEPARIN (PORCINE) PER 1000 UNITS: Performed by: NURSE PRACTITIONER

## 2024-04-12 PROCEDURE — 94799 UNLISTED PULMONARY SVC/PX: CPT

## 2024-04-12 PROCEDURE — 80053 COMPREHEN METABOLIC PANEL: CPT | Performed by: HOSPITALIST

## 2024-04-12 PROCEDURE — 99239 HOSP IP/OBS DSCHRG MGMT >30: CPT | Performed by: NURSE PRACTITIONER

## 2024-04-12 PROCEDURE — 99232 SBSQ HOSP IP/OBS MODERATE 35: CPT | Performed by: INTERNAL MEDICINE

## 2024-04-12 PROCEDURE — 85027 COMPLETE CBC AUTOMATED: CPT | Performed by: HOSPITALIST

## 2024-04-12 PROCEDURE — 94660 CPAP INITIATION&MGMT: CPT

## 2024-04-12 RX ORDER — SPIRONOLACTONE 25 MG/1
12.5 TABLET ORAL DAILY
Qty: 15 TABLET | Refills: 0 | Status: SHIPPED | OUTPATIENT
Start: 2024-04-13

## 2024-04-12 RX ORDER — HYDRALAZINE HYDROCHLORIDE 25 MG/1
25 TABLET, FILM COATED ORAL EVERY 8 HOURS SCHEDULED
Status: DISCONTINUED | OUTPATIENT
Start: 2024-04-12 | End: 2024-04-12 | Stop reason: HOSPADM

## 2024-04-12 RX ORDER — HYDRALAZINE HYDROCHLORIDE 25 MG/1
25 TABLET, FILM COATED ORAL EVERY 8 HOURS SCHEDULED
Qty: 90 TABLET | Refills: 0 | Status: SHIPPED | OUTPATIENT
Start: 2024-04-12

## 2024-04-12 RX ORDER — AMLODIPINE BESYLATE 5 MG/1
5 TABLET ORAL
Qty: 30 TABLET | Refills: 0 | Status: SHIPPED | OUTPATIENT
Start: 2024-04-13 | End: 2024-04-18 | Stop reason: SDUPTHER

## 2024-04-12 RX ADMIN — FINASTERIDE 5 MG: 5 TABLET, FILM COATED ORAL at 09:01

## 2024-04-12 RX ADMIN — SPIRONOLACTONE 12.5 MG: 25 TABLET ORAL at 09:01

## 2024-04-12 RX ADMIN — HEPARIN SODIUM 5000 UNITS: 5000 INJECTION INTRAVENOUS; SUBCUTANEOUS at 09:01

## 2024-04-12 RX ADMIN — TAMSULOSIN HYDROCHLORIDE 0.4 MG: 0.4 CAPSULE ORAL at 09:01

## 2024-04-12 RX ADMIN — PANTOPRAZOLE SODIUM 40 MG: 40 TABLET, DELAYED RELEASE ORAL at 06:21

## 2024-04-12 RX ADMIN — AMLODIPINE BESYLATE 5 MG: 5 TABLET ORAL at 09:01

## 2024-04-12 RX ADMIN — MAGNESIUM OXIDE TAB 400 MG (241.3 MG ELEMENTAL MG) 400 MG: 400 (241.3 MG) TAB at 09:01

## 2024-04-12 RX ADMIN — BUDESONIDE 6 MG: 3 CAPSULE, COATED PELLETS ORAL at 09:01

## 2024-04-12 RX ADMIN — SERTRALINE HYDROCHLORIDE 50 MG: 50 TABLET ORAL at 09:01

## 2024-04-12 RX ADMIN — Medication 1 TABLET: at 09:01

## 2024-04-12 RX ADMIN — GABAPENTIN 300 MG: 300 CAPSULE ORAL at 06:21

## 2024-04-12 RX ADMIN — ASPIRIN 81 MG: 81 TABLET, COATED ORAL at 09:01

## 2024-04-12 RX ADMIN — OXYCODONE HYDROCHLORIDE AND ACETAMINOPHEN 500 MG: 500 TABLET ORAL at 09:01

## 2024-04-12 RX ADMIN — NEBIVOLOL 5 MG: 5 TABLET ORAL at 09:01

## 2024-04-12 NOTE — PROGRESS NOTES
[facility]  IP Progress Note      Chief Complaint/Reason for service: #1 heart failure #2 hypertensive urgency #3 permanent A-fib    Subjective   Subjective: The patient is awake and alert.  He states his breathing is returned to baseline.  He would like to go home.  He has chosen follow-up with  about these masses found on his scans    Past medical, surgical, social and family history reviewed in the patient's electronic medical record.    Objective     Vital Sign Min/Max for last 24 hours  Temp  Min: 97.8 °F (36.6 °C)  Max: 98.6 °F (37 °C)   BP  Min: 135/100  Max: 184/128   Pulse  Min: 73  Max: 113   Resp  Min: 18  Max: 20   SpO2  Min: 92 %  Max: 99 %   No data recorded    No intake or output data in the 24 hours ending 04/12/24 0735          Current Facility-Administered Medications:     acetaminophen (TYLENOL) tablet 650 mg, 650 mg, Oral, Q4H PRN **OR** acetaminophen (TYLENOL) 160 MG/5ML oral solution 650 mg, 650 mg, Oral, Q4H PRN **OR** acetaminophen (TYLENOL) suppository 650 mg, 650 mg, Rectal, Q4H PRN, Kamla Killian W, APRN    amLODIPine (NORVASC) tablet 5 mg, 5 mg, Oral, Q24H, Fredrick Nance MD, 5 mg at 04/11/24 1027    ascorbic acid (VITAMIN C) tablet 500 mg, 500 mg, Oral, Daily, Kamla Killian, APRN, 500 mg at 04/11/24 0947    aspirin EC tablet 81 mg, 81 mg, Oral, Daily, Kamla Killian, APRN, 81 mg at 04/11/24 0947    sennosides-docusate (PERICOLACE) 8.6-50 MG per tablet 2 tablet, 2 tablet, Oral, BID PRN **AND** polyethylene glycol (MIRALAX) packet 17 g, 17 g, Oral, Daily PRN **AND** bisacodyl (DULCOLAX) EC tablet 5 mg, 5 mg, Oral, Daily PRN **AND** bisacodyl (DULCOLAX) suppository 10 mg, 10 mg, Rectal, Daily PRN, Kamla Killian, APRN    Budesonide (ENTOCORT EC) 24 hr capsule 6 mg, 6 mg, Oral, Daily, Kamla Killian APRN, 6 mg at 04/11/24 0947    cloNIDine (CATAPRES) tablet 0.1 mg, 0.1 mg, Oral, Q1H PRN, Fredrick Nance MD,  0.1 mg at 04/11/24 2254    finasteride (PROSCAR) tablet 5 mg, 5 mg, Oral, Daily, Kamla Killian, APRN, 5 mg at 04/11/24 0947    gabapentin (NEURONTIN) capsule 300 mg, 300 mg, Oral, Q8H, Kamla Killian, APRN, 300 mg at 04/12/24 0621    heparin (porcine) 5000 UNIT/ML injection 5,000 Units, 5,000 Units, Subcutaneous, Q12H, Kamla Killian APRN, 5,000 Units at 04/11/24 0947    HYDROcodone-acetaminophen (NORCO) 5-325 MG per tablet 1 tablet, 1 tablet, Oral, Q4H PRN, Van, RASHIDA HinesN    latanoprost (XALATAN) 0.005 % ophthalmic solution 1 drop, 1 drop, Both Eyes, Nightly, Kamla Killian APRN, 1 drop at 04/11/24 2118    magnesium oxide (MAG-OX) tablet 400 mg, 400 mg, Oral, Daily, Kamla Killian APRN, 400 mg at 04/11/24 0947    melatonin tablet 5 mg, 5 mg, Oral, Nightly PRN, Kamla Killian APRQIANA    multivitamin with minerals 1 tablet, 1 tablet, Oral, Daily, Kamla Killian, APRN, 1 tablet at 04/11/24 0947    nebivolol (BYSTOLIC) tablet 5 mg, 5 mg, Oral, Daily, Kamla Killian APRN    nitroglycerin (NITROSTAT) SL tablet 0.4 mg, 0.4 mg, Sublingual, Q5 Min PRN, Kamla Killian, APRN    ondansetron ODT (ZOFRAN-ODT) disintegrating tablet 4 mg, 4 mg, Oral, Q6H PRN **OR** ondansetron (ZOFRAN) injection 4 mg, 4 mg, Intravenous, Q6H PRN, Kamla Killian, APRN    pantoprazole (PROTONIX) EC tablet 40 mg, 40 mg, Oral, Daily, Kamla Killian APRN, 40 mg at 04/12/24 0621    sertraline (ZOLOFT) tablet 50 mg, 50 mg, Oral, Daily, Kamal Killian, APRN, 50 mg at 04/11/24 0947    sodium chloride 0.9 % flush 10 mL, 10 mL, Intravenous, PRN, Kamla Killian, APRN    sodium chloride 0.9 % flush 10 mL, 10 mL, Intravenous, Q12H, Kamla Killian, APRN, 10 mL at 04/11/24 2118    sodium chloride 0.9 % flush 10 mL, 10 mL, Intravenous, PRN, Kamla Killian, APRN    sodium chloride 0.9 % infusion 40 mL, 40 mL, Intravenous, PRN, Kamla Killian, APRN     spironolactone (ALDACTONE) tablet 12.5 mg, 12.5 mg, Oral, Daily, Fredrick Nance MD, 12.5 mg at 04/11/24 1027    tamsulosin (FLOMAX) 24 hr capsule 0.4 mg, 0.4 mg, Oral, Daily, Kamla Killian APRN, 0.4 mg at 04/11/24 0901    Physical Exam: General well-developed well-nourished male in bed not dyspneic tachypneic current heart rate 80        HEENT: No JVP.  No icterus       Respiratory: Equal bilateral symmetrical expansion with very faint late expiratory wheeze       Cardiovascular: Irregular rate and rhythm with no obvious murmur and trace edema to palpation       GI: Soft and flat       Lower Extremities: No lesions       Neuro: Facial expressions are symmetrical       Skin: Warm and dry       Psych: Pleasant affect    Results Review: HST is 17 and 18    Radiology Results:  Imaging Results (Last 72 Hours)       Procedure Component Value Units Date/Time    US Renal Bilateral [[id number]] Collected: 04/11/24 1229     Updated: 04/11/24 1235    Narrative:      US RENAL BILATERAL    Date of Exam: 4/11/2024 11:51 AM EDT    Indication: abnormal CT.    Comparison: CT of 4/10/2024.    Technique: Grayscale and color Doppler ultrasound evaluation of the kidneys and urinary bladder was performed.      Findings: Kidneys are normal in size and shape bilaterally. There is no hydronephrosis. There is a 3.8 x 3.2 x 3.5 cm exophytic left renal cyst. There is an oval hypoechoic solid density at the medial aspect of the left kidney measuring 5.3 x 3.7 x 5.2   cm. This corresponds to an abnormal soft tissue mass seen on the CT exam. The bladder is poorly distended.      Impression:      Impression:  Harika mass medial to the left kidney is suspicious for malignant process. Left renal cyst also noted.        Electronically Signed: April Smith MD    4/11/2024 12:32 PM EDT    Workstation ID: [id number]    CT Abdomen Pelvis With Contrast [[id number]] Collected: 04/10/24 1853     Updated: 04/10/24 1912    Narrative:      CT  ABDOMEN PELVIS W CONTRAST    Date of Exam: 4/10/2024 6:16 PM EDT    Indication: nausea.    Comparison: None available.    Technique: Axial CT images were obtained of the abdomen and pelvis following the uneventful intravenous administration of 80 mL of Isovue-370 intravenous contrast. Reconstructed coronal and sagittal images were also obtained. Automated exposure control   and iterative construction methods were used.      Findings:  Lung Bases:    Mild bibasilar atelectasis or scarring and trace pleural effusions      Liver:Liver is normal in size and CT density. No focal lesions.      Biliary/Gallbladder: The gallbladder is without evidence of radiopaque stones. The biliary tree is nondilated.      Spleen:Spleen is normal in size and CT density.    Pancreas:   Pancreas shows homogeneous density. There is no evidence of pancreatic mass or peripancreatic fluid.      Kidneys: Kidneys are normal in size. Nonobstructive right renal stone. There is no hydronephrosis. There are few simple appearing left renal cysts. Small parapelvic right renal cyst      Adrenals: Adrenal glands are unremarkable.      Retroperitoneal/Lymph Nodes/Vasculature: There is a 4.8 cm pathologically enlarged left para-aortic lymph node. Additional subcentimeter retroperitoneal lymph nodes are seen      Gastrointestinal/Mesentery: The stomach and small bowel loops are normal in caliber. Surgical changes are seen status post left hemicolectomy with a left lower quadrant colostomy.    Mildly prominent base of the prostate with a small nodule measuring 1.2 cm versus small mass at the base of the bladder. Diffuse bladder wall thickening with trabeculated bladder wall      Bony Structures:  Visualized bony structures are consistent with the patient's age.          Impression:      Impression:    1. Left hemicolectomy changes with a left lower quadrant colostomy    2. Pathologically enlarged left para-aortic lymph node compatible with metastatic  disease in this clinical setting    3. Mild basilar atelectasis and trace pleural effusions    4. Small nodule at the base of the prostate versus base of the bladder and diffusely trabeculated bladder wall. Correlation with a bladder ultrasound recommended        Electronically Signed: Yovany Wilks MD    4/10/2024 7:09 PM EDT    Workstation ID: [id number]    CT Angiogram Chest Pulmonary Embolism [[id number]] Collected: 04/10/24 1631     Updated: 04/10/24 1645    Narrative:      CT ANGIOGRAM CHEST PULMONARY EMBOLISM    Date of Exam: 4/10/2024 4:07 PM EDT    Indication: h/o afib s/p watchmen procedure soa.    Comparison: None available.    Technique: Axial CT images were obtained of the chest after the uneventful intravenous administration of Isovue-370, 85 mL utilizing pulmonary embolism protocol.  Reconstructed coronal and sagittal images were also obtained. Automated exposure control   and iterative construction methods were used.      Findings:    Pulmonary arteries: Adequate opacification of the pulmonary arteries. No evidence of acute pulmonary embolism.    Thoracic aorta: The thoracic aorta is not opacified with contrast due to bolus timing. Thoracic aorta is normal in caliber and contour. Small scattered atheromatous calcifications are visualized.    Thyroid: The visualized portion of the thyroid is unremarkable.    Lungs and Pleura: Mild bilateral dependent atelectasis visualized. Small right pleural effusion is visualized. No suspicious pulmonary nodules. No pneumothorax.    Mediastinum/Leigha: No mediastinal or hilar lymphadenopathy.    Lymph nodes: No axillary or supraclavicular lymphadenopathy.    Cardiovascular: The heart is normal in size.No evidence of pericardial effusion. Patient is post watchman procedure. Small coronary calcifications are visualized.    Upper Abdomen: There is a left retroperitoneal soft tissue mass abutting the abdominal aorta measuring 3.6 x 4.8 cm. Evaluation is incomplete on this  chest CTA however, lesion may have slightly increased in size when compared to prior CT performed on   September 24, 2019.    Bones and Soft Tissue: No acute fracture, aggressive osseous lesions, or soft tissue process.        Impression:      Impression:  No evidence of pulmonary embolism.    Post watchman procedure.    Small right pleural effusion.    Again visualized is a left retroperitoneal soft tissue mass measuring 3.6 x 4.8 cm. Evaluation is incomplete on this chest CTA however, lesion may have slightly increased in size when compared to prior abdominal CT performed on September 24, 2019   (previously measured 3.2 x 4.2 cm). If not already performed, consider evaluation by means of percutaneous tissue sampling as clinically warranted.        Electronically Signed: Prem Kevin MD    4/10/2024 4:41 PM EDT    Workstation ID: [id number]    XR Chest 1 View [[id number]] Collected: 04/10/24 1344     Updated: 04/10/24 1349    Narrative:      XR CHEST 1 VW    Date of Exam: 4/10/2024 1:29 PM EDT    Indication: SOA triage protocol    Comparison: 6/12/2010    Findings:  Heart shadow is in the range of normal size. Pulmonary vasculature appears upper limits of normal. There appears to be a left atrial appendage exclusion device in place. There is a suggestion of a small right pleural effusion, however, blunting of the   lateral costophrenic angle is also present in 2010 and this may represent scarring instead. No new pulmonary parenchymal disease is seen. There is no evidence of pneumothorax.      Impression:      Impression:    1. Heart shadow in the upper range of normal size. Mild pulmonary venous hypertension. No evidence of pulmonary edema.    2. Blunting of the right lateral costophrenic angle, but unchanged from 2010. Consider small recurrent pleural effusion, or chronic and stable mild right basilar pleural scarring.    3. No other evidence of active chest pathology.      Electronically Signed: Navdeep Rodriguez MD     4/10/2024 1:46 PM EDT    Workstation ID: [id number]            EKG: A-fib    ECHO: Pending    Tele: No significant pauses.  No significant bradycardia.  Occasional heart rates in the 100s    Assessment   Assessment/Plan: Patient presents with shortness of breath and significant elevated BNP.  Unfortunately intake and output are not recorded.  The patient states his breathing is returned to normal and he would like to go home.  2 hypertensive urgency with heart failure. Will start hydralazine 25 mg q 8 hours  3 permanent A-fib  4abnormal renal US suggestive of cancer with mets. Suggest Oncology consult  Is okay with me for the patient to be discharged recommend follow-up with Jeannette Donato in about 4 weeks  Fredrick Nance MD  04/12/24  07:35 EDT

## 2024-04-12 NOTE — PLAN OF CARE
Goal Outcome Evaluation:  Plan of Care Reviewed With: patient        Progress: improving  Outcome Evaluation: Pt alert and oriented x4. VSS. Pt has rested well in bed throughout shift. Cpap worn while asleep. No complaints of pain. Pt voices no needs at this time, call light and personal items within reach.

## 2024-04-12 NOTE — PLAN OF CARE
Goal Outcome Evaluation:  Plan of Care Reviewed With: patient        Progress: improving  Outcome Evaluation: VSS. RA. Alert and oriented x4. No complaints of pain or nausea. Pt resting comfortably. Ambulating with assistance. No further complaints at this time. Pt to be discharged.

## 2024-04-12 NOTE — DISCHARGE SUMMARY
[facility] Medicine Services  DISCHARGE SUMMARY    Patient Name: Luis Gamboa  : 1945  MRN: [id number]    Date of Admission: 4/10/2024  1:26 PM  Date of Discharge:  2024  Primary Care Physician: Anand De Souza MD    Consults       Date and Time Order Name Status Description    4/10/2024 10:48 PM Inpatient Cardiology Consult Completed             Hospital Course       Active Hospital Problems    Diagnosis  POA    **Dyspnea [R06.00]  Yes    Hypertensive urgency [I16.0]  Yes    Elevated serum creatinine [R79.89]  Unknown    Retroperitoneal mass [R19.00]  Unknown    Pleural effusion [J90]  Unknown    GERD without esophagitis [K21.9]  Unknown    Atrial fibrillation, persistent [I48.19]  Yes    Dyslipidemia [E78.5]  Yes    ZAY on CPAP [G47.33]  Yes      Resolved Hospital Problems   No resolved problems to display.          Hospital Course:  Luis Gamboa is a 78 y.o. male   with a history of anxiety, depression, hypertension, hyperlipidemia, DVT, Crohn's disease s/p partial colectomy and colostomy, A-fib, ZAY on CPAP, presents to the ED with complaints of shortness of air, and generalized weakness.         Hypertensive urgency  A-fib s/p Watchman device  Hyperlipidemia  -- cont Nebivolol   -- cardene drip weaned  -- cardiology followed   --started on Norvasc/ Hydralazine/ and spironolactone and tolerating well, improved blood pressures      Elevated serum creatinine  -- Creatinine 1.43 on admission--> trended down to 1.28 overunight  -- baseline creatinine ~ 1.1-1.2     Left retroperitoneal mass  Enlarged para-aortic lymph node   --CTA chest shows left retroperitoneal soft tissue mass measuring 3.6 x 4.8 cm, lesion may have slightly increased in size when compared to prior CT abdomen 2019  -- CT abdomen pelvis shows pathologically enlarged left para-aortic lymph node compatible with metastatic disease  -- had biopsy 2019 at [facility]  center--12/19/19 path shows Schwannoma, patient chose to continue with observation.  Last imaging at  was 12/17/21 showed no growth  --updated today by Dr Rivera and myself about the growth of the known para-aortic lymph node.   -- follow up with Dr. Ponce at      Prostate versus bladder nodule  --CT abdomen pelvis shows a small nodule at the base of the prostate versus base of the bladder and diffusely trabeculated bladder wall  -- renal ultrasound showed gopal mass medial to left kidney suspicious for malignant process, but it does correlate with the abnormal soft tissue mass seen on CT of the left para-aortic lymph node.   -- PSA: 4.49  --patient has decided to follow up with  Oncology      Right pleural effusion  -- CTA chest shows small right pleural effusion  -- monitor     Left L5 radiculopathy secondary to disc protrusion  -- Follows with Dr. Dillard  -- Gabapentin 300 mg 3 times a day  -- Per Dr. Dillard's note from 4/10, if patient not improving strongly suggest L4-5 discectomy  --follow up with NS as scheduled      ZAY  -- CPAP     GERD  -- PPI     Crohn's disease with partial colectomy and colostomy  -- Follows with Dr. Lester     Remote left lower extremity DVT     BPH  -- Continue Flomax     Anxiety and depression  -- Continue Zoloft     Polymyalgia rheumatica  -- Follows with rheumatology     Generalized weakness  -- fall precautions  -- pt/ot consult    Discharge Follow Up Recommendations for outpatient labs/diagnostics:   PCP 1 week   Heart and Valve clinic 1 week   Cardiology 1 month   [facility] first available     Day of Discharge     HPI:   Doing well. Pain is resolved. No soa at rest. Dyspnea improved. Tolerating diet. Dr Rivera and I had a long discussion with patient and wife (and daughter on phone) about new radiology findings. Offered to have follow up at  vs new workup here at [facility]. Patient and family have elected to follow up with . Feels comfortable going home today. No f/c,  n/v/d, soa or cp     Review of Systems  All other systems negative     Vital Signs:   Temp:  [97.8 °F (36.6 °C)-98.5 °F (36.9 °C)] 98.4 °F (36.9 °C)  Heart Rate:  [] 82  Resp:  [18-20] 18  BP: (144-175)/(106-118) 157/107      Physical Exam:  Constitutional: No acute distress, awake, alert  HENT: NCAT, mucous membranes moist  Respiratory: Clear to auscultation bilaterally, respiratory effort normal   Cardiovascular: RRR, no murmurs, rubs, or gallops  Gastrointestinal: Positive bowel sounds, soft, nontender, nondistended, obese; LLQ ostomy   Musculoskeletal: No bilateral ankle edema  Psychiatric: Appropriate affect, cooperative  Neurologic: Oriented x 3, strength symmetric in all extremities, Cranial Nerves grossly intact to confrontation, speech clear  Skin: No rashes     Pertinent  and/or Most Recent Results     LAB RESULTS:      Lab 04/12/24  0719 04/11/24  0613 04/10/24  1346   WBC 3.63 3.85 5.42   HEMOGLOBIN 13.8 14.5 15.8   HEMATOCRIT 41.2 42.2 46.2   PLATELETS 145 161 212   NEUTROS ABS  --  3.06 4.16   IMMATURE GRANS (ABS)  --  0.03 0.04   LYMPHS ABS  --  0.33* 0.63*   MONOS ABS  --  0.36 0.50   EOS ABS  --  0.04 0.06   .0* 98.1* 96.5         Lab 04/12/24  0719 04/11/24  0613 04/10/24  1346   SODIUM 139 140 139   POTASSIUM 4.1 4.2 4.1   CHLORIDE 104 102 100   CO2 29.0 24.0 28.0   ANION GAP 6.0 14.0 11.0   BUN 19 17 19   CREATININE 1.37* 1.28* 1.43*   EGFR 53.1* 57.6* 50.5*   GLUCOSE 96 85 97   CALCIUM 8.5* 8.9 9.5   TSH  --  1.850  --          Lab 04/12/24  0719 04/10/24  1346   TOTAL PROTEIN 5.9* 7.3   ALBUMIN 3.6 4.3   GLOBULIN 2.3 3.0   ALT (SGPT) 12 16   AST (SGOT) 23 26   BILIRUBIN 0.5 0.8   ALK PHOS 68 88         Lab 04/10/24  1702 04/10/24  1346   PROBNP  --  9,182.0*   HSTROP T 22* 25*                 Brief Urine Lab Results  (Last result in the past 365 days)        Color   Clarity   Blood   Leuk Est   Nitrite   Protein   CREAT   Urine HCG        04/10/24 2325             51.7          04/10/24 2325 Yellow   Clear   Negative   Negative   Negative   Trace                 Microbiology Results (last 10 days)       Procedure Component Value - Date/Time    COVID PRE-OP / PRE-PROCEDURE SCREENING ORDER (NO ISOLATION) - Swab, Nasopharynx [[id number]]  (Normal) Collected: 04/10/24 1746    Lab Status: Final result Specimen: Swab from Nasopharynx Updated: 04/10/24 1814    Narrative:      The following orders were created for panel order COVID PRE-OP / PRE-PROCEDURE SCREENING ORDER (NO ISOLATION) - Swab, Nasopharynx.  Procedure                               Abnormality         Status                     ---------                               -----------         ------                     COVID-19 and FLU A/B PCR...[[id number]]  Normal              Final result                 Please view results for these tests on the individual orders.    COVID-19 and FLU A/B PCR, 1 HR TAT - Swab, Nasopharynx [[id number]]  (Normal) Collected: 04/10/24 1746    Lab Status: Final result Specimen: Swab from Nasopharynx Updated: 04/10/24 1814     COVID19 Not Detected     Influenza A PCR Not Detected     Influenza B PCR Not Detected    Narrative:      Fact sheet for providers: https://www.fda.gov/media/053939/download    Fact sheet for patients: https://www.fda.gov/media/013507/download    Test performed by PCR.            US Renal Bilateral    Result Date: 4/11/2024  US RENAL BILATERAL Date of Exam: 4/11/2024 11:51 AM EDT Indication: abnormal CT. Comparison: CT of 4/10/2024. Technique: Grayscale and color Doppler ultrasound evaluation of the kidneys and urinary bladder was performed. Findings: Kidneys are normal in size and shape bilaterally. There is no hydronephrosis. There is a 3.8 x 3.2 x 3.5 cm exophytic left renal cyst. There is an oval hypoechoic solid density at the medial aspect of the left kidney measuring 5.3 x 3.7 x 5.2 cm. This corresponds to an abnormal soft tissue mass seen on the CT exam. The bladder is poorly  distended.     Impression: Harika mass medial to the left kidney is suspicious for malignant process. Left renal cyst also noted. Electronically Signed: April Smith MD  4/11/2024 12:32 PM EDT  Workstation ID: [id number]    CT Abdomen Pelvis With Contrast    Result Date: 4/10/2024  CT ABDOMEN PELVIS W CONTRAST Date of Exam: 4/10/2024 6:16 PM EDT Indication: nausea. Comparison: None available. Technique: Axial CT images were obtained of the abdomen and pelvis following the uneventful intravenous administration of 80 mL of Isovue-370 intravenous contrast. Reconstructed coronal and sagittal images were also obtained. Automated exposure control and iterative construction methods were used. Findings: Lung Bases:  Mild bibasilar atelectasis or scarring and trace pleural effusions Liver:Liver is normal in size and CT density. No focal lesions. Biliary/Gallbladder: The gallbladder is without evidence of radiopaque stones. The biliary tree is nondilated. Spleen:Spleen is normal in size and CT density. Pancreas: Pancreas shows homogeneous density. There is no evidence of pancreatic mass or peripancreatic fluid. Kidneys: Kidneys are normal in size. Nonobstructive right renal stone. There is no hydronephrosis. There are few simple appearing left renal cysts. Small parapelvic right renal cyst Adrenals: Adrenal glands are unremarkable. Retroperitoneal/Lymph Nodes/Vasculature: There is a 4.8 cm pathologically enlarged left para-aortic lymph node. Additional subcentimeter retroperitoneal lymph nodes are seen Gastrointestinal/Mesentery: The stomach and small bowel loops are normal in caliber. Surgical changes are seen status post left hemicolectomy with a left lower quadrant colostomy. Mildly prominent base of the prostate with a small nodule measuring 1.2 cm versus small mass at the base of the bladder. Diffuse bladder wall thickening with trabeculated bladder wall   Bony Structures:  Visualized bony structures are consistent with  the patient's age.     Impression: 1. Left hemicolectomy changes with a left lower quadrant colostomy 2. Pathologically enlarged left para-aortic lymph node compatible with metastatic disease in this clinical setting 3. Mild basilar atelectasis and trace pleural effusions 4. Small nodule at the base of the prostate versus base of the bladder and diffusely trabeculated bladder wall. Correlation with a bladder ultrasound recommended Electronically Signed: Yovany Wilks MD  4/10/2024 7:09 PM EDT  Workstation ID: [id number]    CT Angiogram Chest Pulmonary Embolism    Result Date: 4/10/2024  CT ANGIOGRAM CHEST PULMONARY EMBOLISM Date of Exam: 4/10/2024 4:07 PM EDT Indication: h/o afib s/p watchmen procedure soa. Comparison: None available. Technique: Axial CT images were obtained of the chest after the uneventful intravenous administration of Isovue-370, 85 mL utilizing pulmonary embolism protocol.  Reconstructed coronal and sagittal images were also obtained. Automated exposure control and iterative construction methods were used. Findings: Pulmonary arteries: Adequate opacification of the pulmonary arteries. No evidence of acute pulmonary embolism. Thoracic aorta: The thoracic aorta is not opacified with contrast due to bolus timing. Thoracic aorta is normal in caliber and contour. Small scattered atheromatous calcifications are visualized. Thyroid: The visualized portion of the thyroid is unremarkable. Lungs and Pleura: Mild bilateral dependent atelectasis visualized. Small right pleural effusion is visualized. No suspicious pulmonary nodules. No pneumothorax. Mediastinum/Leigha: No mediastinal or hilar lymphadenopathy. Lymph nodes: No axillary or supraclavicular lymphadenopathy. Cardiovascular: The heart is normal in size.No evidence of pericardial effusion. Patient is post watchman procedure. Small coronary calcifications are visualized. Upper Abdomen: There is a left retroperitoneal soft tissue mass abutting the  abdominal aorta measuring 3.6 x 4.8 cm. Evaluation is incomplete on this chest CTA however, lesion may have slightly increased in size when compared to prior CT performed on September 24, 2019. Bones and Soft Tissue: No acute fracture, aggressive osseous lesions, or soft tissue process.     Impression: No evidence of pulmonary embolism. Post watchman procedure. Small right pleural effusion. Again visualized is a left retroperitoneal soft tissue mass measuring 3.6 x 4.8 cm. Evaluation is incomplete on this chest CTA however, lesion may have slightly increased in size when compared to prior abdominal CT performed on September 24, 2019 (previously measured 3.2 x 4.2 cm). If not already performed, consider evaluation by means of percutaneous tissue sampling as clinically warranted. Electronically Signed: Prem Kevin MD  4/10/2024 4:41 PM EDT  Workstation ID: [id number]    XR Chest 1 View    Result Date: 4/10/2024  XR CHEST 1 VW Date of Exam: 4/10/2024 1:29 PM EDT Indication: SOA triage protocol Comparison: 6/12/2010 Findings: Heart shadow is in the range of normal size. Pulmonary vasculature appears upper limits of normal. There appears to be a left atrial appendage exclusion device in place. There is a suggestion of a small right pleural effusion, however, blunting of the lateral costophrenic angle is also present in 2010 and this may represent scarring instead. No new pulmonary parenchymal disease is seen. There is no evidence of pneumothorax.     Impression: 1. Heart shadow in the upper range of normal size. Mild pulmonary venous hypertension. No evidence of pulmonary edema. 2. Blunting of the right lateral costophrenic angle, but unchanged from 2010. Consider small recurrent pleural effusion, or chronic and stable mild right basilar pleural scarring. 3. No other evidence of active chest pathology. Electronically Signed: Navdeep Rodriguez MD  4/10/2024 1:46 PM EDT  Workstation ID: [id number]             Results for orders  placed during the hospital encounter of 01/03/24    Adult Transesophageal Echo 3D (ARCHIE) W/ Cont If Necessary Per Protocol    Interpretation Summary    27 mm Watchman FLX atrial appendage occlusion device in place.  There is a small jet of periprosthetic flow measuring proximately 2-3 mm.  This is consistent with adequate left atrial appendage occlusion.    Left ventricular ejection fraction appears to be 51 - 55%.    Mild to moderate mitral valve regurgitation is present.    There is severe, (grade 4) plaque in the aortic arch present.        Discharge Details        Discharge Medications        New Medications        Instructions Start Date   amLODIPine 5 MG tablet  Commonly known as: NORVASC   5 mg, Oral, Every 24 Hours Scheduled   Start Date: April 13, 2024     hydrALAZINE 25 MG tablet  Commonly known as: APRESOLINE   25 mg, Oral, Every 8 Hours Scheduled      spironolactone 25 MG tablet  Commonly known as: ALDACTONE   Take 0.5 (one-half) tablet by mouth Daily.   Start Date: April 13, 2024            Continue These Medications        Instructions Start Date   aspirin 81 MG EC tablet   81 mg, Oral, Daily      Budesonide 3 MG 24 hr capsule  Commonly known as: ENTOCORT EC   6 mg, Oral, Take As Directed, Every 4th day      Evolocumab with Infusor solution cartridge  Commonly known as: REPATHA   420 mg, Subcutaneous, Every 30 Days      finasteride 5 MG tablet  Commonly known as: PROSCAR   5 mg, Oral, Daily      fish oil 1000 MG capsule capsule   1 capsule, Oral, Daily With Breakfast      immune globulin (human) 1 GM/5ML solution   Subcutaneous, Weekly, Infusion       latanoprost 0.005 % ophthalmic solution  Commonly known as: XALATAN   1 drop, Both Eyes, Nightly      magnesium oxide 400 (241.3 Mg) MG tablet tablet  Commonly known as: MAGOX   500 mg, Oral, Daily      multivitamin with minerals tablet tablet   1 tablet, Oral, Daily      nebivolol 5 MG tablet  Commonly known as: BYSTOLIC   5 mg, Oral, Daily       nitroglycerin 0.4 MG SL tablet  Commonly known as: NITROSTAT   1 under the tongue as needed for angina, may repeat q5mins for up three doses      ondansetron ODT 4 MG disintegrating tablet  Commonly known as: ZOFRAN-ODT   4 mg, Oral, Every 6 Hours PRN      pantoprazole 40 MG EC tablet  Commonly known as: Protonix   40 mg, Oral, Daily      sertraline 50 MG tablet  Commonly known as: ZOLOFT   50 mg, Oral, Daily      tamsulosin 0.4 MG capsule 24 hr capsule  Commonly known as: FLOMAX   1 capsule, Oral, Daily      vitamin C 500 MG tablet  Commonly known as: ASCORBIC ACID   500 mg, Oral, Daily      zolpidem 10 MG tablet  Commonly known as: AMBIEN   10 mg, Oral, Nightly PRN             Stop These Medications      clopidogrel 75 MG tablet  Commonly known as: PLAVIX     diphenoxylate-atropine 2.5-0.025 MG per tablet  Commonly known as: LOMOTIL              Allergies   Allergen Reactions    Crestor [Rosuvastatin] Myalgia    Imuran [Azathioprine] GI Intolerance      hemorrhagic urticaria.  Bleeding          Discharge Disposition:  Home or Self Care    Diet:  Hospital:  Diet Order   Procedures    Diet: Cardiac; Healthy Heart (2-3 Na+); Fluid Consistency: Thin (IDDSI 0)               CODE STATUS:    Code Status and Medical Interventions:   Ordered at: 04/10/24 2225     Level Of Support Discussed With:    Patient     Code Status (Patient has no pulse and is not breathing):    CPR (Attempt to Resuscitate)     Medical Interventions (Patient has pulse or is breathing):    Full Support       Future Appointments   Date Time Provider Department Center   5/3/2024 11:20 AM Dave Dillard MD [facility]   5/17/2024  2:30 PM Eloy Weaver APRN [facility]   8/7/2024  1:20 PM Corine Newsome APRN [facility]                 ROSALBA Zimmer  04/12/24      Time Spent on Discharge:  I spent  60  minutes on this discharge activity which included: face-to-face encounter with the patient, reviewing the data in the  system, coordination of the care with the nursing staff as well as consultants, documentation, and entering orders.

## 2024-04-12 NOTE — CASE MANAGEMENT/SOCIAL WORK
Continued Stay Note  [facility]     Patient Name: Luis Gamboa  MRN: [id number]  Today's Date: 4/12/2024    Admit Date: 4/10/2024    Plan: Home   Discharge Plan       Row Name 04/12/24 1143       Plan    Plan Home    Patient/Family in Agreement with Plan yes    Plan Comments Spoke with patient at bedside. Denies any discharge needs at this time. CM will cont to follow.    Final Discharge Disposition Code 01 - home or self-care                   Discharge Codes    No documentation.                 Expected Discharge Date and Time       Expected Discharge Date Expected Discharge Time    Apr 12, 2024               Rachel Arcos RN

## 2024-04-18 ENCOUNTER — OFFICE VISIT (OUTPATIENT)
Dept: CARDIOLOGY | Facility: HOSPITAL | Age: 79
End: 2024-04-18
Payer: MEDICARE

## 2024-04-18 VITALS
TEMPERATURE: 96 F | HEART RATE: 70 BPM | RESPIRATION RATE: 18 BRPM | WEIGHT: 194.9 LBS | OXYGEN SATURATION: 98 % | HEIGHT: 70 IN | DIASTOLIC BLOOD PRESSURE: 86 MMHG | SYSTOLIC BLOOD PRESSURE: 152 MMHG | BODY MASS INDEX: 27.9 KG/M2

## 2024-04-18 DIAGNOSIS — I48.19 ATRIAL FIBRILLATION, PERSISTENT: ICD-10-CM

## 2024-04-18 DIAGNOSIS — I10 ESSENTIAL HYPERTENSION: Primary | ICD-10-CM

## 2024-04-18 RX ORDER — AMLODIPINE BESYLATE 10 MG/1
10 TABLET ORAL
Qty: 90 TABLET | Refills: 1 | Status: SHIPPED | OUTPATIENT
Start: 2024-04-18

## 2024-04-18 NOTE — PROGRESS NOTES
"Chief Complaint  Shortness of Breath    Subjective      History of Present Illness {CC  Problem List  Visit  Diagnosis   Encounters  Notes  Medications  Labs  Result Review Imaging  Media :23}     Luis Gamboa, 78 y.o. male with history of anxiety, depression, hypertension, hyperlipidemia, DVT, Crohn's disease s/p partial colectomy and colostomy, A-fib, ZAY on CPAP  presents to [facility] Heart and Valve clinic for Shortness of Breath.    Patient recently hospitalized at [facility] with complaints of shortness of air and weakness.  Patient was evaluated by cardiology for hypertensive urgency with heart failure.  Recommendation to start hydralazine 25 Mg every 8 hours.  Patient was instructed to follow-up with Corine Newsome in 4 weeks and scheduled with heart valve clinic as well.    Patient presents today with continued intermittent shortness of breath. Overall still feeling quite poorly after being transitioned to rosuvastatin earlier this year. -164 on home monitoring since discharge. No tachypalpitation, near syncope/syncope.         Objective     Vital Signs:   Vitals:    04/18/24 1314   BP: 152/86   BP Location: Left arm   Patient Position: Sitting   Cuff Size: Adult   Pulse: 70   Resp: 18   Temp: 96 °F (35.6 °C)   TempSrc: Temporal   SpO2: 98%   Weight: 88.4 kg (194 lb 14.4 oz)   Height: 177.8 cm (70\")     Body mass index is 27.97 kg/m².  Physical Exam  Vitals and nursing note reviewed.   Constitutional:       Appearance: Normal appearance.   HENT:      Head: Normocephalic.   Eyes:      Extraocular Movements: Extraocular movements intact.   Neck:      Vascular: No carotid bruit.   Cardiovascular:      Rate and Rhythm: Normal rate and regular rhythm. Rhythm irregular.      Pulses: Normal pulses.      Heart sounds: Normal heart sounds, S1 normal and S2 normal. No murmur heard.  Pulmonary:      Effort: Pulmonary effort is normal. No respiratory distress.      Breath " sounds: Normal breath sounds.   Musculoskeletal:      Cervical back: Neck supple.      Right lower leg: No edema.      Left lower leg: No edema.   Skin:     General: Skin is warm and dry.   Neurological:      General: No focal deficit present.      Mental Status: He is alert.   Psychiatric:         Mood and Affect: Mood normal.         Behavior: Behavior normal.         Thought Content: Thought content normal.        Data Reviewed:{ Labs  Result Review  Imaging  Med Tab  Media :23}     Comprehensive Metabolic Panel (04/12/2024 07:19)  CBC (No Diff) (04/12/2024 07:19)  TSH (04/11/2024 06:13)  ECG 12 Lead ED Triage Standing Order; SOA (04/10/2024 13:30)   Stress Test With Myocardial Perfusion One Day (03/06/2024 13:08)  Adult Transesophageal Echo 3D (ARCHIE) W/ Cont If Necessary Per Protocol (01/03/2024 15:59)      Assessment & Plan   Assessment and Plan {CC Problem List  Visit Diagnosis  ROS  Review (Popup)  Health Maintenance  Quality  BestPractice  Medications  SmartSets  SnapShot Encounters  Media :23}     1. Essential hypertension  -Continued elevation after recent hospitalization  -ARCHIE January 2024 with preserved LVEF 51-55%  -PET stress March 2024 with no evidence of ischemia  -Increase amlodipine to 10mg daily  -Continue nebivolol, hydralazine, sprionolactone as prescribed  - Basic Metabolic Panel; Future  - CBC (No Diff); Future  -Continue f/u with PCP next week, primary cardiology as scheeduled    2. Atrial fibrillation, persistent  -Rate controlled, overall asymptomatic with AF  -[id number]:3  -s/p Watchman LAAO implant November 2023  -ARCHIE January 2024 with adequate Weatchman closure  -Patient has self discontinued clopidogrel, he will maintain ASA monotherapy after some discussion that ideally he would be on DAPT until 6mo anniversary   -Will order 1y ARCHIE per Watchman protocol      Follow Up {Instructions Charge Capture  Follow-up Communications :23}     Return if symptoms worsen or fail to  improve.      Patient was given instructions and counseling regarding his condition or for health maintenance advice. Please see specific information pulled into the AVS if appropriate.  Patient was instructed to call the [facility] Center with any questions, concerns, or worsening symptoms.    Dictated Utilizing Dragon Dictation   Please note that portions of this note were completed with a voice recognition program.   Part of this note may be an electronic transcription/translation of spoken language to printed text using the Dragon Dictation System.

## 2024-04-18 NOTE — PATIENT INSTRUCTIONS
- Increase amlodipine to 10mg daily    - Lab work next week at PCP, will fax orders    - Will discuss rheumatology referral with Corine

## 2024-05-06 ENCOUNTER — TRANSCRIBE ORDERS (OUTPATIENT)
Age: 79
End: 2024-05-06
Payer: MEDICARE

## 2024-05-07 DIAGNOSIS — M35.3 POLYMYALGIA RHEUMATICA: Primary | ICD-10-CM

## 2024-05-09 ENCOUNTER — TELEPHONE (OUTPATIENT)
Dept: CARDIOLOGY | Facility: CLINIC | Age: 79
End: 2024-05-09
Payer: MEDICARE

## 2024-05-09 RX ORDER — SPIRONOLACTONE 25 MG/1
12.5 TABLET ORAL DAILY
Qty: 15 TABLET | Refills: 0 | Status: SHIPPED | OUTPATIENT
Start: 2024-05-09

## 2024-05-09 RX ORDER — HYDRALAZINE HYDROCHLORIDE 25 MG/1
25 TABLET, FILM COATED ORAL EVERY 8 HOURS SCHEDULED
Qty: 90 TABLET | Refills: 0 | Status: SHIPPED | OUTPATIENT
Start: 2024-05-09

## 2024-05-24 ENCOUNTER — TELEPHONE (OUTPATIENT)
Dept: CARDIOLOGY | Facility: CLINIC | Age: 79
End: 2024-05-24

## 2024-05-24 ENCOUNTER — TELEPHONE (OUTPATIENT)
Dept: CARDIOLOGY | Facility: HOSPITAL | Age: 79
End: 2024-05-24
Payer: MEDICARE

## 2024-05-24 NOTE — TELEPHONE ENCOUNTER
Name: WALT LAWLER    Relationship: Emergency Contact    Best Callback Number: [phone]    Incoming call to the Hub, requesting to  Reschedule their HFU appointment on 5/24/24.     Per Hub workflow, further review is needed before the task can be completed.    Result of Call: Please reach out to the patient to reschedule

## 2024-05-24 NOTE — TELEPHONE ENCOUNTER
Labs previously ordered has not yet been completed.  Asked the patient to please complete locally, or to contact report to [facility] lab. If for any reason they are unable to complete within the next two weeks, we have requested them to please let our office know.     CBC (No Diff) (04/18/2024 14:26)    Basic Metabolic Panel (04/18/2024 14:26)

## 2024-05-28 NOTE — PROGRESS NOTES
Subjective:     Encounter Date:05/24/2024      Patient ID: Luis Gamboa is a 78 y.o.   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      INTERNIST: Anand De Souza MD  [facility] PH[facility]: Kay Cannon MD  COLORECTAL SURGEON: Alfonso Lester MD  IMMUNOLOGIST:  Deysi Smith MD .  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  Recurrent progressive NYHA class III symptoms with abnormal EKG and acceptable chest x-ray with essentially normal right dominant coronary artery and preserved systolic LV dysfunction, LVEF (0.66) with continued medical therapy felt warranted, July 2009.   CCS class 0 chest discomfort/NYHA class II-III fatigue symptoms with abnormal monitor demonstrating bradycardia, August 2019.   Stress echocardiogram 9/12/2019: Estimated EF = 65%.Left ventricular systolic function is normal.There is no evidence of pericardial effusion.Acceptable negative echocardiographic exercise stress test without anginal type chest discomfort, definitive ischemic EKG changes, or echocardiographic regional wall motion abnormality with preserved systolic left ventricular function following exercise to 85% predicted maximal heart rate and 88% predicted exercise capacity.Overall, current study suggests low probability for significant focal obstructive coronary artery disease with preserved systolic left ventricular function  Echocardiogram 12/15/2022: LVEF 55%, LV wall thickness consistent with hypertrophy, cardiac valves are anatomically and functionally normal, RVSP less than 35 mmHg, atrial fibrillation noted throughout study  Regadenoson stress test 3/6/2024: LVEF 57%,  normal myocardial perfusion study with no evidence of ischemia, scant CAC on CT attenuation correction images  Residual class I symptoms, August 2020, March 2021, October 2021, November 2022, May 2023, December 2023, March 2024, June 2024  Bradycardia with Holter monitor 8/20/2019: Average heart rate was 52 bpm, heart rate less than 50 bpm 68% the time but there were no pauses and only 9 PVCs and 2200 PACs which represent 3% total heartbeats with no atrial fibrillation  Moderate dyslipidemia; intolerant to statins.   Labile hypertension.  Mildly overweight, BMI 28.90.  Obstructive sleep apnea with intermittent CPAP therapy (did not tolerate).   Hiatal hernia/probable GERD, April 2009.  Remote left calf DVT/Coumadin therapy - data deficit.   Chronic lower tract obstructive symptoms/probable BPH.   Crohn’s disease with partial colectomy and colostomy - data deficit.   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   Chronic intermittent anxiety/remote depression.   Chronic insomnia.   Intermittent allergic rhinitis.   Retroperitoneal schwannoma: Abnormal CT abdomen/pelvis demonstrating a 3.2 x 4.2 cm mass in the leftward retroperitoneum with attenuation coefficient consistent with a solid, noncystic mass, September 2019, 3.6 x 4.8 cm on CT abdomen/pelvis April 2024  Paroxysmal atrial fibrillation  Diagnosed November 2022 in office, asymptomatic  CHADsVasc 3, started Eliquis  Holter monitor showed 100% atrial fib burden with heart rate ranged between  bpm with average 83 bpm with occasional PVCs  Successful external cardioversion 1/16/2023  Mobile cardiac outpatient telemetry monitor March 2023 with no evidence of atrial fibrillation, SVT, pauses, heart block, or VT.  Heart rate ranged between  bpm with average 61 bpm, 9% PAC burden, less than 1% PVC burden  27 mm Watchman FLX device implant 11/13/2023, ARCHIE showed EF 51-55%, mild MR, device well-seated with no periprosthetic flow.  ARCHIE 1/3/2024:  27 mm Watchman FLX atrial appendage occlusion device in place.  Small jet of periprosthetic flow measuring approximately 2-3 mm.  This is consistent with adequate NGA occlusion.  LVEF 51-55%, mild to moderate MR, severe grade 4 plaque in the aortic arch present  [facility] 2-day hospitalization April 2024 for dyspnea in the setting of hypertensive urgency.  COVID May 2024  Remote operations:  Partial colectomy with colostomy, 2001.   Hand surgery secondary to trauma, 2004.   Lithotripsy September 2019    Allergies   Allergen Reactions    Crestor [Rosuvastatin] Myalgia    Imuran [Azathioprine] GI Intolerance      hemorrhagic urticaria.  Bleeding        Current Outpatient Medications   Medication Instructions    amLODIPine (NORVASC) 10 mg, Oral, Every 24 Hours Scheduled    aspirin 81 mg, Oral, Daily    Budesonide (ENTOCORT EC) 6 mg, Oral, Take As Directed, Every 4th day    Evolocumab with Infusor (REPATHA) 420 mg, Subcutaneous, Every 30 Days    finasteride (PROSCAR) 5 mg, Oral, Daily    gabapentin (NEURONTIN) 300 mg, Oral, 2 Times Daily    hydrALAZINE (APRESOLINE) 25 mg, Oral, Every 8 Hours Scheduled    immune globulin, human, 1 GM/5ML solution Subcutaneous, Weekly, Infusion     latanoprost (XALATAN) 0.005 % ophthalmic solution 1 drop, Both Eyes, Nightly    magnesium oxide (MAGOX) 500 mg, Oral, Daily    Multiple Vitamins-Minerals (MULTIVITAMIN ADULT PO) 1 tablet, Oral, Daily    nebivolol (BYSTOLIC) 5 mg, Oral, Daily    nitroglycerin (NITROSTAT) 0.4 MG SL tablet 1 under the tongue as needed for angina, may repeat q5mins for up three doses    omeprazole (PRILOSEC) 20 mg, Oral, Daily    ondansetron (ZOFRAN) 4 mg, Oral, As Needed    ondansetron ODT (ZOFRAN-ODT) 4 mg, Oral, Every 6 Hours PRN    sertraline (ZOLOFT) 50 mg, Oral, Daily    spironolactone (ALDACTONE) 25 MG tablet Take 0.5 (one-half) tablet by mouth Daily.    tamsulosin (FLOMAX) 0.4 MG capsule 24 hr capsule 1 capsule, Oral, Daily    vitamin C (ASCORBIC ACID) 500 mg,  Oral, Daily    zolpidem (AMBIEN) 10 mg, Oral, Nightly PRN         HISTORY OF PRESENT ILLNESS:  Patient is here for 2-month follow-up.  He had a [facility] 2-day hospitalization April 2024 for dyspnea in the setting of hypertensive urgency.  He was started on Cardene gtt which was then weaned.  Patient was then started on amlodipine, hydralazine, and spironolactone.  CTA negative for PE.  Patient has chronic retroperitoneal soft tissue mass measuring 3.6 x 4.8 cm and was recently evaluated by his oncologist at [facility] with no recommendations for intervention at this time.  Patient had a cardiac PET March 2024 with no evidence of ischemia.  His amlodipine was increased to 10 mg daily by heart/valve clinic.  He reported that after his Watchman device implant he self discontinued Plavix even though he was supposed to have it until May 2024.  The patient had COVID mid May 2024.  He had congestion, fatigue, myalgias, and thought that everything tasted like gasoline that he would eat.  He says that this is slowly improving.  He had never had COVID before until recently.  He has an upcoming HIDA scan and was told that his gallbladder was thickened on gallbladder ultrasound.  He has not yet started taking Repatha because it was too expensive.  He is going to follow-up next week with the [facility] and see if it is cheaper there.  He denies any chest pain, shortness of breath, palpitations, presyncope, or syncope.  Occasionally he will have some mild swelling in one of his feet.  He does admit to eating some salty foods.  He says that he generally only notices this if he has multiple doctors appointments in 1 day.  At home he does not notice this is much because he is able to walk around a little bit more.  His blood pressures at home have been in the 130s systolic.  He is only taking hydralazine twice a day instead of 3 times a day.  He is unsure whether he is taking his spironolactone.  He will check his medication bottles when he  "gets home and let me know his medications, what the dosage is, and how frequently he has been taking them.  He feels kind of \"draggy\" in the mornings but takes all of his blood pressure medications at 1 time.  He is accompanied in office today by his wife.      ROS   All other systems reviewed and otherwise negative.    Procedures       Objective:       Vitals:    06/04/24 1323 06/04/24 1325   BP: 135/84 140/86   BP Location: Right arm Right arm   Patient Position: Sitting Standing   Cuff Size: Adult Adult   Pulse: 80 86   SpO2: 97%    Weight: 91.4 kg (201 lb 6.4 oz)    Height: 177.8 cm (70\")      Body mass index is 28.9 kg/m².  Wt Readings from Last 2 Encounters:   06/04/24 91.4 kg (201 lb 6.4 oz)   04/18/24 88.4 kg (194 lb 14.4 oz)        Constitutional:       Appearance: Healthy appearance. Not in distress.   Neck:      Vascular: No JVR. JVD normal.   Pulmonary:      Effort: Pulmonary effort is normal.      Breath sounds: Normal breath sounds. No wheezing. No rhonchi. No rales.   Chest:      Chest wall: Not tender to palpatation.   Cardiovascular:      PMI at left midclavicular line. Normal rate. Irregularly irregular rhythm. Normal S1. Normal S2.       Murmurs: There is a grade 1/6 systolic murmur at the URSB.      No gallop.  No click. No rub.   Pulses:     Intact distal pulses.   Edema:     Peripheral edema absent.   Abdominal:      General: Bowel sounds are normal.      Palpations: Abdomen is soft.      Tenderness: There is no abdominal tenderness.   Musculoskeletal: Normal range of motion.         General: No tenderness. Skin:     General: Skin is warm and dry.   Neurological:      General: No focal deficit present.      Mental Status: Alert and oriented to person, place and time.           Lab Review:   Lab Results   Component Value Date    GLUCOSE 96 04/12/2024    BUN 19 04/12/2024    CREATININE 1.37 (H) 04/12/2024    EGFRIFNONA 49 12/17/2021    EGFRIFAFRI 60 12/17/2021    BCR 13.9 04/12/2024    CO2 29.0 " 04/12/2024    CALCIUM 8.5 (L) 04/12/2024    ALBUMIN 3.6 04/12/2024    AST 23 04/12/2024    ALT 12 04/12/2024       Lab Results   Component Value Date    WBC 3.63 04/12/2024    HGB 13.8 04/12/2024    HCT 41.2 04/12/2024    .0 (H) 04/12/2024     04/12/2024       Lab Results   Component Value Date    HGBA1C 5.40 11/08/2023       Lab Results   Component Value Date    TSH 1.850 04/11/2024           Results for orders placed during the hospital encounter of 01/03/24    Adult Transesophageal Echo 3D (ARCHIE) W/ Cont If Necessary Per Protocol    Interpretation Summary    27 mm Watchman FLX atrial appendage occlusion device in place.  There is a small jet of periprosthetic flow measuring proximately 2-3 mm.  This is consistent with adequate left atrial appendage occlusion.    Left ventricular ejection fraction appears to be 51 - 55%.    Mild to moderate mitral valve regurgitation is present.    There is severe, (grade 4) plaque in the aortic arch present.            Advance Care Planning   ACP discussion was held with the patient during this visit. Patient does not have an advance directive, declines further assistance.      Assessment:    The patient had a recent [facility] 2-day hospitalization April 2024 for dyspnea in the setting of hypertensive urgency.  He was started on Cardene gtt which was then weaned.  Patient was then started on amlodipine, hydralazine, and spironolactone.  CTA negative for PE.  Patient has chronic retroperitoneal soft tissue mass measuring 3.6 x 4.8 cm and is followed by oncology with no plans for intervention at this time.  Patient does have upcoming HIDA scan.  Patient had a cardiac PET March 2024 with no evidence of ischemia.  Patient is status post Watchman device implant November 2023.  He will have routine repeat 1 year ARCHIE in November 2024.  Currently now only on aspirin. He is unsure whether he is taking his spironolactone.  He will check his medication bottles when he gets home and  let me know his medications, what the dosage is, and how frequently he has been taking them.  Would recommend that the patient start Repatha; [facility]  He has been intolerant to statins with myalgias.     Diagnosis Plan   1. Atrial fibrillation, persistent  Patient is status post Watchman device implant November 2023.  He will have routine repeat 1 year ARCHIE in November 2024.  Currently now only on aspirin.      2. Hypertensive heart disease without heart failure  No recurrent angina pectoris or CHF on current activity schedule; continue current treatment       3. Dyslipidemia  No new labs to review, [facility] Repatha cheaper there, prior authorization here was still too expensive               Plan:         Patient to continue current medications and close follow up with the above providers.  Tentative cardiology follow up in December 2024 or patient may return sooner PRN.  Patient has upcoming ARCHIE November 2024 1 year status post Watchman device implant.  Patient will call back when he gets home to let me know the doses and what blood pressure medications he is taking, suggested taking Bystolic at night  Would recommend that the patient start Repatha; patient is following up with the [facility] next week to see if it is cheaper there.  He has been intolerant to statins with myalgias.    Electronically signed by ROSALBA Luo, 06/04/24, 2:02 PM EDT.

## 2024-06-04 ENCOUNTER — OFFICE VISIT (OUTPATIENT)
Dept: CARDIOLOGY | Facility: CLINIC | Age: 79
End: 2024-06-04
Payer: MEDICARE

## 2024-06-04 VITALS
DIASTOLIC BLOOD PRESSURE: 86 MMHG | HEIGHT: 70 IN | SYSTOLIC BLOOD PRESSURE: 140 MMHG | BODY MASS INDEX: 28.83 KG/M2 | OXYGEN SATURATION: 97 % | HEART RATE: 86 BPM | WEIGHT: 201.4 LBS

## 2024-06-04 DIAGNOSIS — I11.9 HYPERTENSIVE HEART DISEASE WITHOUT HEART FAILURE: ICD-10-CM

## 2024-06-04 DIAGNOSIS — I48.19 ATRIAL FIBRILLATION, PERSISTENT: Primary | ICD-10-CM

## 2024-06-04 DIAGNOSIS — E78.5 DYSLIPIDEMIA: ICD-10-CM

## 2024-06-04 PROCEDURE — 99214 OFFICE O/P EST MOD 30 MIN: CPT | Performed by: NURSE PRACTITIONER

## 2024-06-04 PROCEDURE — 3079F DIAST BP 80-89 MM HG: CPT | Performed by: NURSE PRACTITIONER

## 2024-06-04 PROCEDURE — 1160F RVW MEDS BY RX/DR IN RCRD: CPT | Performed by: NURSE PRACTITIONER

## 2024-06-04 PROCEDURE — 3075F SYST BP GE 130 - 139MM HG: CPT | Performed by: NURSE PRACTITIONER

## 2024-06-04 PROCEDURE — 1159F MED LIST DOCD IN RCRD: CPT | Performed by: NURSE PRACTITIONER

## 2024-06-04 RX ORDER — OMEPRAZOLE 20 MG/1
20 CAPSULE, DELAYED RELEASE ORAL DAILY
COMMUNITY

## 2024-06-04 RX ORDER — ONDANSETRON 4 MG/1
4 TABLET, FILM COATED ORAL AS NEEDED
COMMUNITY

## 2024-06-04 RX ORDER — GABAPENTIN 300 MG/1
300 CAPSULE ORAL 2 TIMES DAILY
COMMUNITY
Start: 2024-02-15

## 2024-06-05 ENCOUNTER — TELEPHONE (OUTPATIENT)
Dept: CARDIOLOGY | Facility: CLINIC | Age: 79
End: 2024-06-05
Payer: MEDICARE

## 2024-06-05 DIAGNOSIS — I10 ESSENTIAL HYPERTENSION: ICD-10-CM

## 2024-06-05 RX ORDER — HYDRALAZINE HYDROCHLORIDE 25 MG/1
25 TABLET, FILM COATED ORAL EVERY 8 HOURS SCHEDULED
Qty: 270 TABLET | Refills: 3 | Status: SHIPPED | OUTPATIENT
Start: 2024-06-05

## 2024-06-05 RX ORDER — AMLODIPINE BESYLATE 10 MG/1
10 TABLET ORAL
Qty: 90 TABLET | Refills: 3 | Status: SHIPPED | OUTPATIENT
Start: 2024-06-05

## 2024-06-05 RX ORDER — NEBIVOLOL 5 MG/1
5 TABLET ORAL DAILY
Qty: 90 TABLET | Refills: 3 | Status: SHIPPED | OUTPATIENT
Start: 2024-06-05

## 2024-06-05 RX ORDER — SPIRONOLACTONE 25 MG/1
12.5 TABLET ORAL DAILY
Qty: 45 TABLET | Refills: 3 | Status: SHIPPED | OUTPATIENT
Start: 2024-06-05

## 2024-06-05 NOTE — TELEPHONE ENCOUNTER
Caller: WALT LAWLER    Relationship: Emergency Contact    Best call back number: [phone]    What was the call regarding:   PTS WIFE CALLING IN TO REPORT PATIENTS HEART MEDICATIONS    AMLODIPINE 10 MG 1X A DAY  HYDRALAZINE 25 MG 2X A DAY  NEBIVOLOL 5 MG 1X A DAY  SPIRONOLACTONE 25 MG 1/2 TAB DAILY   PT WILL NEED REFILLS ON ALL OF THESE      Caller: WALT LAWLER    Relationship: Emergency Contact    Best call back number: [phone]    Requested Prescriptions:   Requested Prescriptions     Pending Prescriptions Disp Refills    hydrALAZINE (APRESOLINE) 25 MG tablet 90 tablet 0     Sig: Take 1 tablet by mouth Every 8 (Eight) Hours.    spironolactone (ALDACTONE) 25 MG tablet 15 tablet 0     Sig: Take 0.5 (one-half) tablet by mouth Daily.    nebivolol (BYSTOLIC) 5 MG tablet 30 tablet 5     Sig: Take 1 tablet by mouth Daily.    amLODIPine (NORVASC) 10 MG tablet 90 tablet 1     Sig: Take 1 tablet by mouth Daily.        Pharmacy where request should be sent: [address] [phone] [place] [phone] FX     Last office visit with prescribing clinician: 6/4/2024   Last telemedicine visit with prescribing clinician: Visit date not found   Next office visit with prescribing clinician: 12/4/2024     Additional details provided by patient:     Does the patient have less than a 3 day supply:  [x] Yes  [] No      Jerson Campo   06/05/24 11:37 EDT

## 2024-06-10 ENCOUNTER — TELEPHONE (OUTPATIENT)
Dept: NEUROSURGERY | Facility: CLINIC | Age: 79
End: 2024-06-10
Payer: MEDICARE

## 2024-06-10 NOTE — TELEPHONE ENCOUNTER
Provider: DR ESCOBAR    Caller: WALT LAWLER    Relationship to Patient: WIFE      Phone Number:  [phone]    Reason for Call: PT'S WIFE STATES PT WAS GOING TO PHYSICAL THERAPY AND WAS SEEING SOME IMPROVEMENTS UNTIL HE GOT COVID.  HE HAS BEEN COUGHING A LOT AND IS IN EXCRUCIATING PAIN.  HE IS USING A WALKER AROUND HIS HOME AND IT HAS GREATLY AFFECTED HIS MOBILITY AND HIS QUALITY OF LIFE-UNABLE TO REST OR DO MUCH.  PLEASE ADVISE.    When was the patient last seen: 4-10-24    When did it start: HE WAS DIAGNOSED WITH COVID MAY 24, THAT IS WHEN ALL OF THE COUGHING STARTED.  HE DID TRY TO GO TO PHYSICAL THERAPY, BUT IT WAS VERY PAINFUL.      Where is it located: LEFT SIDE TO HIS HIP AND DOWN HIS LEFT LEG      What therapies/medications have you tried: WIFE STATES HE HAS TAKEN NAPROXEN (2X A DAY)-A FEW THEY HAD FROM A FAMILY MEMBER, LIDOCAINE PATCHES, IBUPROFEN.

## 2024-06-10 NOTE — TELEPHONE ENCOUNTER
Spoke w/ wife. She stated that her  has worsening symptoms, he has not completed the PT due to COVID, and he is over with it. He needs to be seen soon she said bec of he can hardly walk and in so much pain. His appt has been sched for elizabeth w/ Karon Edward PA-C. They are very thankful for it.

## 2024-06-11 ENCOUNTER — OFFICE VISIT (OUTPATIENT)
Dept: NEUROSURGERY | Facility: CLINIC | Age: 79
End: 2024-06-11
Payer: MEDICARE

## 2024-06-11 ENCOUNTER — TELEPHONE (OUTPATIENT)
Dept: NEUROSURGERY | Facility: CLINIC | Age: 79
End: 2024-06-11

## 2024-06-11 VITALS
WEIGHT: 196 LBS | HEIGHT: 70 IN | TEMPERATURE: 97.1 F | DIASTOLIC BLOOD PRESSURE: 60 MMHG | SYSTOLIC BLOOD PRESSURE: 102 MMHG | BODY MASS INDEX: 28.06 KG/M2

## 2024-06-11 DIAGNOSIS — R07.89 CHEST PAIN, ATYPICAL: ICD-10-CM

## 2024-06-11 DIAGNOSIS — M51.16 LUMBAR DISC HERNIATION WITH RADICULOPATHY: ICD-10-CM

## 2024-06-11 DIAGNOSIS — Z13.1 ENCOUNTER FOR SCREENING FOR DIABETES MELLITUS: ICD-10-CM

## 2024-06-11 DIAGNOSIS — M54.16 LUMBAR RADICULOPATHY: Primary | ICD-10-CM

## 2024-06-11 DIAGNOSIS — R09.89 LABILE HYPERTENSION: ICD-10-CM

## 2024-06-11 DIAGNOSIS — I48.0 PAROXYSMAL ATRIAL FIBRILLATION: Primary | ICD-10-CM

## 2024-06-11 DIAGNOSIS — M51.16 LUMBAR DISC HERNIATION WITH RADICULOPATHY: Primary | ICD-10-CM

## 2024-06-11 RX ORDER — FAMOTIDINE 10 MG
20 TABLET ORAL
OUTPATIENT
Start: 2024-06-11

## 2024-06-11 RX ORDER — TRAMADOL HYDROCHLORIDE 50 MG/1
50 TABLET ORAL EVERY 6 HOURS PRN
Qty: 60 TABLET | Refills: 0 | Status: SHIPPED | OUTPATIENT
Start: 2024-06-11

## 2024-06-11 RX ORDER — NAPROXEN 250 MG/1
250 TABLET ORAL 2 TIMES DAILY PRN
Qty: 45 TABLET | Refills: 1 | Status: SHIPPED | OUTPATIENT
Start: 2024-06-11

## 2024-06-11 RX ORDER — CHLORHEXIDINE GLUCONATE 40 MG/ML
SOLUTION TOPICAL
Qty: 120 ML | Refills: 0 | Status: SHIPPED | OUTPATIENT
Start: 2024-06-11

## 2024-06-11 RX ORDER — SODIUM CHLORIDE 0.9 % (FLUSH) 0.9 %
1-10 SYRINGE (ML) INJECTION AS NEEDED
OUTPATIENT
Start: 2024-06-11

## 2024-06-11 RX ORDER — SODIUM CHLORIDE 0.9 % (FLUSH) 0.9 %
10 SYRINGE (ML) INJECTION EVERY 12 HOURS SCHEDULED
OUTPATIENT
Start: 2024-06-11

## 2024-06-11 RX ORDER — SODIUM CHLORIDE 9 MG/ML
40 INJECTION, SOLUTION INTRAVENOUS AS NEEDED
OUTPATIENT
Start: 2024-06-11

## 2024-06-11 RX ORDER — SODIUM CHLORIDE 450 MG/100ML
100 INJECTION, SOLUTION INTRAVENOUS CONTINUOUS
OUTPATIENT
Start: 2024-06-11

## 2024-06-11 NOTE — H&P
"     Subjective  Chief Complaint: back and left leg pain      Patient ID: Luis Gamboa is a 79 y.o. male is here today for follow-up.     History of Present Illness  Mr Gamboa is a 80 yo retired  who had seen Dr Dillard in April of this year for severe back pain extending into the L5 distribution of his left lower extremity. He has had low grade problems over the years in this leg, but in Feb of this year it became much worse.  Dr. Dillard started him on gabapentin, which he has been taking, and referred him for PT. He had to wait a couple weeks to start PT and had only had two visits by early May, so he cancelled his scheduled follow up with Dr. Dillard. He was actually doing better with PT and gabapentin, but then in late May developed COVID with a severe cough. The coughing made his back and leg pain much worse and over the weekend it escalated to a \"10+\" level of pain. He called the office yesterday for an appointment.   Today, he states that the pain is in the same distribution: left buttock, hip, and lateral thigh down to the lateral calf, stopping at the ankle.  He denies right leg symptoms, bowel or bladder dysfunction. Pain is worse with walking and standing. It is present all the time. He can get some mild relief lying on the couch in his right side with a pillow behind his back.  He has not had any falls, but does feel that his left leg will give out on him and uses a walker at home or wheelchair if he is out. He has not shirin able to continue PT since his pain worsened. He was taking some naproxen with a bit of relief, but does not have anything stronger at home. He is quite miserable     Past medical history is remarkable for extensive cardiac history including bradycardia,labile hypertension,  paroxysmal a fib and history of DVT. He had been on coumadin in the past, but had a watchman device implant in 11/23 and now is only on aspirin. His most recent stress test was in March 2024. He " also has Chron's disease, glaucoma, and ZAY with CPap use.      The following portions of the patient's history were reviewed and updated as appropriate: allergies, current medications, past family history, past medical history, past social history, past surgical history and problem list.  Medical History        Past Medical History:   Diagnosis Date    Allergic rhinitis 11/03/2016     Intermittent    Atrial fibrillation      Chronic anxiety 11/03/2016     Chronic intermittent anxiety/remote depression.     Chronic insomnia 11/03/2016    Colostomy in place      Crohn's disease 11/03/2016     Crohn’s disease with partial colectomy and colostomy - data deficit.     DVT (deep venous thrombosis)       Remote left calf DVT/Coumadin therapy - data deficit.    Dyslipidemia 11/03/2016     Moderate    Enlarged prostate      Glaucoma      Hiatal hernia 11/03/2016     probable GERD, April 2009.    Hyperlipidemia      Hypertensive cardiovascular disease 11/03/2016     Probable hypertensive cardiovascular disease: Remote progressive exertional dyspnea/chest pain syndrome with acceptable GXT and borderline abnormal acceptable Holter monitor, April 2009. Recurrent progressive NYHA class III symptoms with abnormal EKG and acceptable chest x-ray with essentially normal right dominant coronary artery and preserved systolic LV dysfunction, LVEF (0.66) with continued m    Labile hypertension 11/03/2016    Myalgia due to statin 01/03/2024    Obstructive sleep apnea on CPAP 11/03/2016     Obstructive sleep apnea with intermittent CPAP therapy (did not tolerate).  --PATIENT STATES THE DOES NOT USE MACHINE ALL THE TIME    Vitamin deficiency       Chronic vitamin deficiency secondary to Crohn’s disease.           Surgical History         Past Surgical History:   Procedure Laterality Date    ATRIAL APPENDAGE EXCLUSION LEFT WITH TRANSESOPHAGEAL ECHOCARDIOGRAM N/A 11/13/2023     Procedure: Atrial Appendage Occlusion;  Surgeon: Tevin Esparza  MD JOSE ARMANDO;  Location: [facility] INVASIVE LOC[facility];  Service: Cardiology;  Laterality: N/A;    CARDIOVERSION         2023 PER DR. VICKERS    COLECTOMY PARTIAL / TOTAL        with colostomy    HAND SURGERY        secondary to trauma            Family history:         Family History   Problem Relation Age of Onset    No Known Problems Mother      No Known Problems Father           Social history:   Social History   Social History            Socioeconomic History    Marital status:    Tobacco Use    Smoking status: Former       Current packs/day: 0.00       Types: Cigarettes       Quit date:        Years since quittin.4       Passive exposure: Never    Smokeless tobacco: Never   Vaping Use    Vaping status: Never Used   Substance and Sexual Activity    Alcohol use: No    Drug use: No    Sexual activity: Defer            Review of Systems   Constitutional:  Negative for activity change, appetite change, chills, diaphoresis, fatigue, fever and unexpected weight change.   HENT:  Negative for congestion, dental problem, drooling, ear discharge, ear pain, facial swelling, hearing loss, mouth sores, nosebleeds, postnasal drip, rhinorrhea, sinus pressure, sinus pain, sneezing, sore throat, tinnitus, trouble swallowing and voice change.    Eyes:  Negative for photophobia, pain, discharge, redness, itching and visual disturbance.   Respiratory:  Negative for apnea, cough, choking, chest tightness, shortness of breath, wheezing and stridor.    Cardiovascular:  Negative for chest pain, palpitations and leg swelling.   Gastrointestinal:  Negative for abdominal distention, abdominal pain, anal bleeding, blood in stool, constipation, diarrhea, nausea, rectal pain and vomiting.   Endocrine: Negative for cold intolerance, heat intolerance, polydipsia, polyphagia and polyuria.   Genitourinary:  Negative for decreased urine volume, difficulty urinating, dysuria, enuresis, flank pain, frequency, genital sores,  "hematuria and urgency.   Musculoskeletal:  Positive for back pain. Negative for arthralgias, gait problem, joint swelling, myalgias, neck pain and neck stiffness.   Skin:  Negative for color change, pallor, rash and wound.   Allergic/Immunologic: Negative for environmental allergies, food allergies and immunocompromised state.   Neurological:  Negative for dizziness, tremors, seizures, syncope, facial asymmetry, speech difficulty, weakness, light-headedness, numbness and headaches.   Hematological:  Negative for adenopathy. Does not bruise/bleed easily.   Psychiatric/Behavioral:  Negative for agitation, behavioral problems, confusion, decreased concentration, dysphoric mood, hallucinations, self-injury, sleep disturbance and suicidal ideas. The patient is not nervous/anxious and is not hyperactive.                Objective  Blood pressure 102/60, temperature 97.1 °F (36.2 °C), temperature source Infrared, height 177.8 cm (70\"), weight 88.9 kg (196 lb).  Body mass index is 28.12 kg/m².     Physical Exam  CONSTITUTIONAL:   - Patient is well-nourished. He is anxious and in significant discomfort, sitting on his right hip with left leg extended   - Pleasant and appears stated age.  PSYCHIATRIC:  - Normal mood and affect  - Behavior is normal.  HENT:   Head: Normocephalic and Atraumatic.   Eyes:     - Pupils are equal, round, and reactive to light.     - EOM are normal.   CV:   - Regular rate and rhythm on palpable radial pulse   PULMONARY:   - Speaking in full sentences, breathing non labored  - No wheezing   SKIN:  - Clean, dry and intact   MUSCULOSKELETAL:  - Neck/Back tenderness to palpation is not observed.   - Strength is intact in the upper and lower extremities to direct testing. He is pain limited in hip flexion on the left.   - Muscle tone is normal throughout.  - gait was not tested today  - ROM in neck/back is normal.  - Straight leg raise is positive even in a sitting position   NEUROLOGICAL:  - A&Ox3  - " Attention span, language function, and cognition are intact.  - Sensation is intact to light touch testing throughout.  - Deep tendon reflexes are 1+ and symmetrical.    - Coordination is intact.     Patient's Body mass index is 28.14 kg/m². indicating that he is moderately overweight     MRI of the lumbar spine dated 3/27/2024 demonstrates a large,flattened disc extrusion on the left at L4/5. This has migrated into the lateral recess in these films. The images were reviewed with the patient and his wife, who is with him today and the findings were described at length.                Assessment & Plan  Mr Gamboa has a large disc herniation at L4/5 on the left, which corresponds to his distribution of left leg pain. He initially had a slight improvement in his pain with conservative treatment  but this has become much worse over the last couple of weeks. Gabapentin is no longer helping and he is unable to participate with physical therapy.   Dr Dillard had discussed the possible need for discectomy at this level. We discussed the procedure in detail today including risks of bleeding, dural tear, nerve injury or paralysis. He is also higher risk due to his cardiac history and will need to obtain cardiac clearance prior to surgery. Risks, benefits, and alternatives were discussed and he is eager to proceed with surgery. We will prescribe tramadol as well as naproxen to be used sparingly and will plan to get him scheduled for L4/5 discectomy promptly     Diagnoses and all orders for this visit:     1. Paroxysmal atrial fibrillation (Primary)     2. Lumbar disc herniation with radiculopathy     3. Chest pain, atypical     4. Labile hypertension           No follow-ups on file.                 This document signed by Olga Edward PA-C  June 11, 2024 11:59 EDT

## 2024-06-11 NOTE — PROGRESS NOTES
"Subjective     Chief Complaint: back and left leg pain     Patient ID: Luis Gamboa is a 79 y.o. male is here today for follow-up.    History of Present Illness  Mr Gamboa is a 78 yo retired  who had seen Dr Dilalrd in April of this year for severe back pain extending into the L5 distribution of his left lower extremity. He has had low grade problems over the years in this leg, but in Feb of this year it became much worse.  Dr. Dillard started him on gabapentin, which he has been taking, and referred him for PT. He had to wait a couple weeks to start PT and had only had two visits by early May, so he cancelled his scheduled follow up with Dr. Dillard. He was actually doing better with PT and gabapentin, but then in late May developed COVID with a severe cough. The coughing made his back and leg pain much worse and over the weekend it escalated to a \"10+\" level of pain. He called the office yesterday for an appointment.   Today, he states that the pain is in the same distribution: left buttock, hip, and lateral thigh down to the lateral calf, stopping at the ankle.  He denies right leg symptoms, bowel or bladder dysfunction. Pain is worse with walking and standing. It is present all the time. He can get some mild relief lying on the couch in his right side with a pillow behind his back.  He has not had any falls, but does feel that his left leg will give out on him and uses a walker at home or wheelchair if he is out. He has not shirin able to continue PT since his pain worsened. He was taking some naproxen with a bit of relief, but does not have anything stronger at home. He is quite miserable    Past medical history is remarkable for extensive cardiac history including bradycardia,labile hypertension,  paroxysmal a fib and history of DVT. He had been on coumadin in the past, but had a watchman device implant in 11/23 and now is only on aspirin. His most recent stress test was in March 2024. He also " has Chron's disease, glaucoma, and ZAY with CPap use.     The following portions of the patient's history were reviewed and updated as appropriate: allergies, current medications, past family history, past medical history, past social history, past surgical history and problem list.  Past Medical History:   Diagnosis Date    Allergic rhinitis 11/03/2016    Intermittent    Atrial fibrillation     Chronic anxiety 11/03/2016    Chronic intermittent anxiety/remote depression.     Chronic insomnia 11/03/2016    Colostomy in place     Crohn's disease 11/03/2016    Crohn’s disease with partial colectomy and colostomy - data deficit.     DVT (deep venous thrombosis)     Remote left calf DVT/Coumadin therapy - data deficit.    Dyslipidemia 11/03/2016    Moderate    Enlarged prostate     Glaucoma     Hiatal hernia 11/03/2016    probable GERD, April 2009.    Hyperlipidemia     Hypertensive cardiovascular disease 11/03/2016    Probable hypertensive cardiovascular disease: Remote progressive exertional dyspnea/chest pain syndrome with acceptable GXT and borderline abnormal acceptable Holter monitor, April 2009. Recurrent progressive NYHA class III symptoms with abnormal EKG and acceptable chest x-ray with essentially normal right dominant coronary artery and preserved systolic LV dysfunction, LVEF (0.66) with continued m    Labile hypertension 11/03/2016    Myalgia due to statin 01/03/2024    Obstructive sleep apnea on CPAP 11/03/2016    Obstructive sleep apnea with intermittent CPAP therapy (did not tolerate).  --PATIENT STATES THE DOES NOT USE MACHINE ALL THE TIME    Vitamin deficiency     Chronic vitamin deficiency secondary to Crohn’s disease.        Past Surgical History:   Procedure Laterality Date    ATRIAL APPENDAGE EXCLUSION LEFT WITH TRANSESOPHAGEAL ECHOCARDIOGRAM N/A 11/13/2023    Procedure: Atrial Appendage Occlusion;  Surgeon: Tevin Esparza MD;  Location: [facility] INVASIVE LOCATION;  Service: Cardiology;   Laterality: N/A;    CARDIOVERSION      2023 PER DR. VICKERS    COLECTOMY PARTIAL / TOTAL      with colostomy    HAND SURGERY      secondary to trauma        Family history:   Family History   Problem Relation Age of Onset    No Known Problems Mother     No Known Problems Father        Social history:   Social History     Socioeconomic History    Marital status:    Tobacco Use    Smoking status: Former     Current packs/day: 0.00     Types: Cigarettes     Quit date:      Years since quittin.4     Passive exposure: Never    Smokeless tobacco: Never   Vaping Use    Vaping status: Never Used   Substance and Sexual Activity    Alcohol use: No    Drug use: No    Sexual activity: Defer       Review of Systems   Constitutional:  Negative for activity change, appetite change, chills, diaphoresis, fatigue, fever and unexpected weight change.   HENT:  Negative for congestion, dental problem, drooling, ear discharge, ear pain, facial swelling, hearing loss, mouth sores, nosebleeds, postnasal drip, rhinorrhea, sinus pressure, sinus pain, sneezing, sore throat, tinnitus, trouble swallowing and voice change.    Eyes:  Negative for photophobia, pain, discharge, redness, itching and visual disturbance.   Respiratory:  Negative for apnea, cough, choking, chest tightness, shortness of breath, wheezing and stridor.    Cardiovascular:  Negative for chest pain, palpitations and leg swelling.   Gastrointestinal:  Negative for abdominal distention, abdominal pain, anal bleeding, blood in stool, constipation, diarrhea, nausea, rectal pain and vomiting.   Endocrine: Negative for cold intolerance, heat intolerance, polydipsia, polyphagia and polyuria.   Genitourinary:  Negative for decreased urine volume, difficulty urinating, dysuria, enuresis, flank pain, frequency, genital sores, hematuria and urgency.   Musculoskeletal:  Positive for back pain. Negative for arthralgias, gait problem, joint swelling, myalgias,  "neck pain and neck stiffness.   Skin:  Negative for color change, pallor, rash and wound.   Allergic/Immunologic: Negative for environmental allergies, food allergies and immunocompromised state.   Neurological:  Negative for dizziness, tremors, seizures, syncope, facial asymmetry, speech difficulty, weakness, light-headedness, numbness and headaches.   Hematological:  Negative for adenopathy. Does not bruise/bleed easily.   Psychiatric/Behavioral:  Negative for agitation, behavioral problems, confusion, decreased concentration, dysphoric mood, hallucinations, self-injury, sleep disturbance and suicidal ideas. The patient is not nervous/anxious and is not hyperactive.        Objective   Blood pressure 102/60, temperature 97.1 °F (36.2 °C), temperature source Infrared, height 177.8 cm (70\"), weight 88.9 kg (196 lb).  Body mass index is 28.12 kg/m².    Physical Exam  CONSTITUTIONAL:   - Patient is well-nourished. He is anxious and in significant discomfort, sitting on his right hip with left leg extended   - Pleasant and appears stated age.  PSYCHIATRIC:  - Normal mood and affect  - Behavior is normal.  HENT:   Head: Normocephalic and Atraumatic.   Eyes:     - Pupils are equal, round, and reactive to light.     - EOM are normal.   CV:   - Regular rate and rhythm on palpable radial pulse   PULMONARY:   - Speaking in full sentences, breathing non labored  - No wheezing   SKIN:  - Clean, dry and intact   MUSCULOSKELETAL:  - Neck/Back tenderness to palpation is not observed.   - Strength is intact in the upper and lower extremities to direct testing. He is pain limited in hip flexion on the left.   - Muscle tone is normal throughout.  - gait was not tested today  - ROM in neck/back is normal.  - Straight leg raise is positive even in a sitting position   NEUROLOGICAL:  - A&Ox3  - Attention span, language function, and cognition are intact.  - Sensation is intact to light touch testing throughout.  - Deep tendon reflexes " are 1+ and symmetrical.    - Coordination is intact.     Patient's Body mass index is 28.14 kg/m². indicating that he is moderately overweight    MRI of the lumbar spine dated 3/27/2024 demonstrates a large,flattened disc extrusion on the left at L4/5. This has migrated into the lateral recess in these films. The images were reviewed with the patient and his wife, who is with him today and the findings were described at length.        Assessment & Plan   Mr Gamboa has a large disc herniation at L4/5 on the left, which corresponds to his distribution of left leg pain. He initially had a slight improvement in his pain with conservative treatment  but this has become much worse over the last couple of weeks. Gabapentin is no longer helping and he is unable to participate with physical therapy.   Dr Dillard had discussed the possible need for discectomy at this level. We discussed the procedure in detail today including risks of bleeding, dural tear, nerve injury or paralysis. He is also higher risk due to his cardiac history and will need to obtain cardiac clearance prior to surgery. Risks, benefits, and alternatives were discussed and he is eager to proceed with surgery. We will prescribe tramadol as well as naproxen to be used sparingly and will plan to get him scheduled for L4/5 discectomy promptly    Diagnoses and all orders for this visit:    1. Paroxysmal atrial fibrillation (Primary)    2. Lumbar disc herniation with radiculopathy    3. Chest pain, atypical    4. Labile hypertension        No follow-ups on file.           This document signed by Olga Edward PA-C  June 11, 2024 11:59 EDT

## 2024-06-17 DIAGNOSIS — M54.16 LUMBAR RADICULOPATHY: ICD-10-CM

## 2024-06-17 DIAGNOSIS — M51.16 LUMBAR DISC HERNIATION WITH RADICULOPATHY: Primary | ICD-10-CM

## 2024-06-17 RX ORDER — GABAPENTIN 300 MG/1
300 CAPSULE ORAL 3 TIMES DAILY
Qty: 90 CAPSULE | Refills: 0 | Status: SHIPPED | OUTPATIENT
Start: 2024-06-17

## 2024-06-17 NOTE — TELEPHONE ENCOUNTER
Provider:  Nishant  Surgery/Procedure:  Upcoming: Lumbar Discectomy Left L4-5  Surgery/Procedure Date:  6/21/24  Last visit:   6/11/24  Next visit: NA     Reason for call:  Spoke to patient and spouse, Jayla. Mr. Gamboa has had worsening pain each day. At this point he is even having difficulty getting to the bathroom with his walker, and the pain is causing nausea - wife states he is not eating. He is also complaining that the pain is so severe he is not getting any sleep. He complains of the pain in the same locations as prior documented at his left buttock, hip, lateral thigh, lateral calf and ankle, but that it has increasingly worsened daily. He also has some spasms in the low back and leg with any movement. He reports having some numbness and tingling in the same locations of pain in the left leg that began about 2-3 days ago. No loss of bowel or bladder. His wife did state that she gave him an additional Gabapentin this morning, he typically takes 300mg BID but she gave him 600mg this morning, and that did give him some additional relief - but reports the tramadol, naproxen and ice/heat is not helping at all. He has pre admission testing tomorrow and she is afraid she will not be able to get him to the car to make the appointment.     He did mention he would like a refill of gabapentin, he only has about 8 doses remaining, and would like to see if we could increase the dose as well?    Sj:    1 06/11/2024 [id number] Tramadol Hydrochloride 50MG [facility] 60.00 15 01 [place]  [facility]  1 05/13/2024 [id number] Gabapentin 300MG [facility] 90.00 30 01 [place]  Refill 03/08/2024 [facility]  1 04/04/2024 [id number] Lorazepam 0.5MG * Overlap * [facility] 30.00 15 01 [place]  New 04/04/2024 [facility]  1 03/27/2024 [id number] Tramadol Hydrochloride 50MG *   Overlap *  [facility] 30.00 10 01  [place]  New 03/27/2024 [facility]

## 2024-06-18 ENCOUNTER — PRE-ADMISSION TESTING (OUTPATIENT)
Dept: PREADMISSION TESTING | Facility: HOSPITAL | Age: 79
End: 2024-06-18
Payer: MEDICARE

## 2024-06-18 VITALS — HEIGHT: 70 IN | BODY MASS INDEX: 28.06 KG/M2 | WEIGHT: 196 LBS

## 2024-06-18 DIAGNOSIS — M51.16 LUMBAR DISC HERNIATION WITH RADICULOPATHY: ICD-10-CM

## 2024-06-18 DIAGNOSIS — Z13.1 ENCOUNTER FOR SCREENING FOR DIABETES MELLITUS: ICD-10-CM

## 2024-06-18 LAB
ALBUMIN SERPL-MCNC: 3.5 G/DL (ref 3.5–5.2)
ALBUMIN/GLOB SERPL: 1.4 G/DL
ALP SERPL-CCNC: 95 U/L (ref 39–117)
ALT SERPL W P-5'-P-CCNC: 33 U/L (ref 1–41)
ANION GAP SERPL CALCULATED.3IONS-SCNC: 11 MMOL/L (ref 5–15)
AST SERPL-CCNC: 49 U/L (ref 1–40)
BASOPHILS # BLD AUTO: 0.03 10*3/MM3 (ref 0–0.2)
BASOPHILS NFR BLD AUTO: 0.6 % (ref 0–1.5)
BILIRUB SERPL-MCNC: 0.7 MG/DL (ref 0–1.2)
BUN SERPL-MCNC: 22 MG/DL (ref 8–23)
BUN/CREAT SERPL: 19.8 (ref 7–25)
CALCIUM SPEC-SCNC: 9.1 MG/DL (ref 8.6–10.5)
CHLORIDE SERPL-SCNC: 99 MMOL/L (ref 98–107)
CO2 SERPL-SCNC: 29 MMOL/L (ref 22–29)
CREAT SERPL-MCNC: 1.11 MG/DL (ref 0.76–1.27)
DEPRECATED RDW RBC AUTO: 48.8 FL (ref 37–54)
EGFRCR SERPLBLD CKD-EPI 2021: 68 ML/MIN/1.73
EOSINOPHIL # BLD AUTO: 0.16 10*3/MM3 (ref 0–0.4)
EOSINOPHIL NFR BLD AUTO: 3.1 % (ref 0.3–6.2)
ERYTHROCYTE [DISTWIDTH] IN BLOOD BY AUTOMATED COUNT: 13.7 % (ref 12.3–15.4)
GLOBULIN UR ELPH-MCNC: 2.5 GM/DL
GLUCOSE SERPL-MCNC: 103 MG/DL (ref 65–99)
HBA1C MFR BLD: 5.2 % (ref 4.8–5.6)
HCT VFR BLD AUTO: 42.7 % (ref 37.5–51)
HGB BLD-MCNC: 14.5 G/DL (ref 13–17.7)
IMM GRANULOCYTES # BLD AUTO: 0.08 10*3/MM3 (ref 0–0.05)
IMM GRANULOCYTES NFR BLD AUTO: 1.5 % (ref 0–0.5)
LYMPHOCYTES # BLD AUTO: 0.75 10*3/MM3 (ref 0.7–3.1)
LYMPHOCYTES NFR BLD AUTO: 14.4 % (ref 19.6–45.3)
MCH RBC QN AUTO: 32.8 PG (ref 26.6–33)
MCHC RBC AUTO-ENTMCNC: 34 G/DL (ref 31.5–35.7)
MCV RBC AUTO: 96.6 FL (ref 79–97)
MONOCYTES # BLD AUTO: 0.48 10*3/MM3 (ref 0.1–0.9)
MONOCYTES NFR BLD AUTO: 9.2 % (ref 5–12)
NEUTROPHILS NFR BLD AUTO: 3.7 10*3/MM3 (ref 1.7–7)
NEUTROPHILS NFR BLD AUTO: 71.2 % (ref 42.7–76)
NRBC BLD AUTO-RTO: 0 /100 WBC (ref 0–0.2)
PLATELET # BLD AUTO: 128 10*3/MM3 (ref 140–450)
PMV BLD AUTO: 9.3 FL (ref 6–12)
POTASSIUM SERPL-SCNC: 4.5 MMOL/L (ref 3.5–5.2)
PROT SERPL-MCNC: 6 G/DL (ref 6–8.5)
RBC # BLD AUTO: 4.42 10*6/MM3 (ref 4.14–5.8)
SODIUM SERPL-SCNC: 139 MMOL/L (ref 136–145)
WBC NRBC COR # BLD AUTO: 5.2 10*3/MM3 (ref 3.4–10.8)

## 2024-06-18 PROCEDURE — 80053 COMPREHEN METABOLIC PANEL: CPT

## 2024-06-18 PROCEDURE — 36415 COLL VENOUS BLD VENIPUNCTURE: CPT

## 2024-06-18 PROCEDURE — 87081 CULTURE SCREEN ONLY: CPT

## 2024-06-18 PROCEDURE — 85025 COMPLETE CBC W/AUTO DIFF WBC: CPT

## 2024-06-18 PROCEDURE — 83036 HEMOGLOBIN GLYCOSYLATED A1C: CPT

## 2024-06-18 NOTE — PAT
An arrival time for procedure was not provided during PAT visit. If patient had any questions or concerns about their arrival time, they were instructed to contact their surgeon/physician.  Additionally, if the patient referred to an arrival time that was acquired from their my chart account, patient was encouraged to verify that time with their surgeon/physician. Arrival times are NOT provided in Pre Admission Testing Department.    Patient viewed general PAT education video as instructed in their preoperative information received from their surgeon.  Patient stated the general PAT education video was viewed in its entirety and survey completed.  Copies of PAT general education handouts (Incentive Spirometry, Meds to Beds Program, Patient Belongings, Pre-op skin preparation instructions, Blood Glucose testing, Visitor policy, Surgery FAQ, Code H) distributed to patient if not printed. Education related to the PAT pass and skin preparation for surgery (if applicable) completed in PAT as a reinforcement to PAT education video. Patient instructed to return PAT pass provided today as well as completed skin preparation sheet (if applicable) on the day of procedure.     Additionally if patient had not viewed video yet but intended to view it at home or in our waiting area, then referred them to the handout with QR code/link provided during PAT visit.  Instructed patient to complete survey after viewing the video in its entirety.  Encouraged patient/family to read PAT general education handouts thoroughly and notify PAT staff with any questions or concerns. Patient verbalized understanding of all information and priority content.    Patient instructed to drink 20 ounces of Gatorade or Gatorlyte (if diabetic) and it needs to be completed 1 hour (for Main OR patients) or 2 hours (scheduled  section & [facility] patients) before given arrival time for procedure (NO RED Gatorade and NO Gatorade Zero).    Patient verbalized  understanding.    Patient denies any current skin issues.     Bactroban (if prescribed) and Chlorhexidine Prescription prescribed by physician before PAT visit.  Verified with patient that medication(s) were picked up from their pharmacy.  Written instructions given to patient during PAT visit.  Patient/family also instructed to complete skin prep checklist and return the checklist on the day of surgery to preoperative staff.  Patient/family verbalized understanding.    Patient to apply Chlorhexadine wipes  to surgical area (as instructed) the night before procedure and the AM of procedure. Wipes provided.    Verified patient previously completed cardiology visit for cardiac risk assessment in preparation for upcoming procedure, completion of 12-lead ECG within six months, and risk assessment letter reviewed. No further interventions required.

## 2024-06-19 LAB — MRSA SPEC QL CULT: NORMAL

## 2024-06-20 ENCOUNTER — ANESTHESIA EVENT (OUTPATIENT)
Dept: PERIOP | Facility: HOSPITAL | Age: 79
End: 2024-06-20
Payer: MEDICARE

## 2024-06-20 ENCOUNTER — TELEPHONE (OUTPATIENT)
Dept: NEUROSURGERY | Facility: CLINIC | Age: 79
End: 2024-06-20
Payer: MEDICARE

## 2024-06-20 RX ORDER — FAMOTIDINE 10 MG/ML
20 INJECTION, SOLUTION INTRAVENOUS ONCE
Status: CANCELLED | OUTPATIENT
Start: 2024-06-20 | End: 2024-06-20

## 2024-06-20 RX ORDER — SODIUM CHLORIDE, SODIUM LACTATE, POTASSIUM CHLORIDE, CALCIUM CHLORIDE 600; 310; 30; 20 MG/100ML; MG/100ML; MG/100ML; MG/100ML
9 INJECTION, SOLUTION INTRAVENOUS CONTINUOUS
Status: CANCELLED | OUTPATIENT
Start: 2024-06-20

## 2024-06-20 RX ORDER — CYCLOBENZAPRINE HCL 10 MG
10 TABLET ORAL 2 TIMES DAILY PRN
Qty: 30 TABLET | Refills: 0 | Status: SHIPPED | OUTPATIENT
Start: 2024-06-20

## 2024-06-20 RX ORDER — SODIUM CHLORIDE 0.9 % (FLUSH) 0.9 %
10 SYRINGE (ML) INJECTION EVERY 12 HOURS SCHEDULED
Status: CANCELLED | OUTPATIENT
Start: 2024-06-20

## 2024-06-20 RX ORDER — SODIUM CHLORIDE 0.9 % (FLUSH) 0.9 %
10 SYRINGE (ML) INJECTION AS NEEDED
Status: CANCELLED | OUTPATIENT
Start: 2024-06-20

## 2024-06-20 RX ORDER — METHYLPREDNISOLONE 4 MG/1
TABLET ORAL
Qty: 21 TABLET | Refills: 0 | Status: SHIPPED | OUTPATIENT
Start: 2024-06-20

## 2024-06-20 RX ORDER — FAMOTIDINE 20 MG/1
20 TABLET, FILM COATED ORAL ONCE
Status: CANCELLED | OUTPATIENT
Start: 2024-06-20 | End: 2024-06-20

## 2024-06-20 NOTE — TELEPHONE ENCOUNTER
Spoke with patient's wife. He's really struggling but is adamant that he does not want to come to the ER because the stretchers are so uncomfortable in his current pain level. I sent an Rx for a medrol dose pack. He will hold his other steroid and his naprosyn until surgery.  I also sent a flexeril BID and he is instructed to take it between doses of tramadol.  He is 79 and his wife and daughter were cautioned to watch him carefully and assist with any ambulation, which they are already doing due to pain. He has surgery at 7am tomorrow.   They are staying in [place] at their daughter's house and the Rx was sent to [facility] at [place] [address] ([address]) at their request.

## 2024-06-20 NOTE — TELEPHONE ENCOUNTER
Provider:  Nishant  Surgery/Procedure:  Upcoming - Lumbar Discectomy L4-5  Surgery/Procedure Date:  6/21/24  Last visit:   6/11/24  Next visit: NA     Reason for call:  Spoke to Jayla, patients spouse. She reports that Mr. Gamboa is jerking with intense shooting nerve pain. Spoke to her on 6/17/24 on the same issues but she reports his pain is worsening every day and now to the point that he's unable to get comfortably in any position. His pain is located in the left buttock, hip, and down the leg to the ankle. He is taking Tramadol, naproxen, and we also increased his gabapentin on Monday to 300mg TID, but she states nothing is helping his pain whatsoever. She requests a direct admit to the hospital today for pain management, does not want to go to the ER. No bowel or bladder incontinence and no worsening numbness.

## 2024-06-21 ENCOUNTER — HOSPITAL ENCOUNTER (OUTPATIENT)
Facility: HOSPITAL | Age: 79
Setting detail: HOSPITAL OUTPATIENT SURGERY
Discharge: HOME | End: 2024-06-21
Attending: NEUROLOGICAL SURGERY | Admitting: NEUROLOGICAL SURGERY
Payer: MEDICARE

## 2024-06-21 ENCOUNTER — APPOINTMENT (OUTPATIENT)
Dept: GENERAL RADIOLOGY | Facility: HOSPITAL | Age: 79
End: 2024-06-21
Payer: MEDICARE

## 2024-06-21 ENCOUNTER — ANESTHESIA (OUTPATIENT)
Dept: PERIOP | Facility: HOSPITAL | Age: 79
End: 2024-06-21
Payer: MEDICARE

## 2024-06-21 VITALS
SYSTOLIC BLOOD PRESSURE: 137 MMHG | BODY MASS INDEX: 28.06 KG/M2 | RESPIRATION RATE: 18 BRPM | TEMPERATURE: 97.7 F | WEIGHT: 196 LBS | OXYGEN SATURATION: 92 % | HEART RATE: 93 BPM | HEIGHT: 70 IN | DIASTOLIC BLOOD PRESSURE: 85 MMHG

## 2024-06-21 DIAGNOSIS — M51.16 LUMBAR DISC HERNIATION WITH RADICULOPATHY: ICD-10-CM

## 2024-06-21 PROCEDURE — 25010000002 METHYLPREDNISOLONE PER 40 MG: Performed by: NEUROLOGICAL SURGERY

## 2024-06-21 PROCEDURE — 63030 LAMOT DCMPRN NRV RT 1 LMBR: CPT

## 2024-06-21 PROCEDURE — C1713 ANCHOR/SCREW BN/BN,TIS/BN: HCPCS | Performed by: NEUROLOGICAL SURGERY

## 2024-06-21 PROCEDURE — 25010000002 DEXAMETHASONE PER 1 MG

## 2024-06-21 PROCEDURE — 63030 LAMOT DCMPRN NRV RT 1 LMBR: CPT | Performed by: NEUROLOGICAL SURGERY

## 2024-06-21 PROCEDURE — 25810000003 SODIUM CHLORIDE 0.9 % SOLUTION

## 2024-06-21 PROCEDURE — 25010000002 PHENYLEPHRINE 10 MG/ML SOLUTION 1 ML VIAL

## 2024-06-21 PROCEDURE — 25010000002 SUGAMMADEX 200 MG/2ML SOLUTION

## 2024-06-21 PROCEDURE — 25010000002 ONDANSETRON PER 1 MG

## 2024-06-21 PROCEDURE — 25010000002 FENTANYL CITRATE (PF) 100 MCG/2ML SOLUTION

## 2024-06-21 PROCEDURE — 76000 FLUOROSCOPY <1 HR PHYS/QHP: CPT

## 2024-06-21 PROCEDURE — 25010000002 PROPOFOL 10 MG/ML EMULSION

## 2024-06-21 PROCEDURE — 25010000002 CEFAZOLIN PER 500 MG: Performed by: PHYSICIAN ASSISTANT

## 2024-06-21 DEVICE — SSC BONE WAX
Type: IMPLANTABLE DEVICE | Site: SPINE LUMBAR | Status: FUNCTIONAL
Brand: SSC BONE WAX

## 2024-06-21 DEVICE — HEMOST ABS SURGIFOAM SZ100 8X12 10MM: Type: IMPLANTABLE DEVICE | Site: SPINE LUMBAR | Status: FUNCTIONAL

## 2024-06-21 DEVICE — FLOSEAL HEMOSTATIC MATRIX, 5ML
Type: IMPLANTABLE DEVICE | Site: SPINE LUMBAR | Status: FUNCTIONAL
Brand: FLOSEAL HEMOSTATIC MATRIX

## 2024-06-21 RX ORDER — DROPERIDOL 2.5 MG/ML
0.62 INJECTION, SOLUTION INTRAMUSCULAR; INTRAVENOUS ONCE AS NEEDED
Status: DISCONTINUED | OUTPATIENT
Start: 2024-06-21 | End: 2024-06-21 | Stop reason: HOSPADM

## 2024-06-21 RX ORDER — NEBIVOLOL 5 MG/1
5 TABLET ORAL ONCE
Status: COMPLETED | OUTPATIENT
Start: 2024-06-21 | End: 2024-06-21

## 2024-06-21 RX ORDER — SODIUM CHLORIDE 9 MG/ML
INJECTION, SOLUTION INTRAVENOUS CONTINUOUS PRN
Status: DISCONTINUED | OUTPATIENT
Start: 2024-06-21 | End: 2024-06-21 | Stop reason: SURG

## 2024-06-21 RX ORDER — METHYLPREDNISOLONE ACETATE 40 MG/ML
INJECTION, SUSPENSION INTRA-ARTICULAR; INTRALESIONAL; INTRAMUSCULAR; SOFT TISSUE AS NEEDED
Status: DISCONTINUED | OUTPATIENT
Start: 2024-06-21 | End: 2024-06-21 | Stop reason: HOSPADM

## 2024-06-21 RX ORDER — ONDANSETRON 2 MG/ML
INJECTION INTRAMUSCULAR; INTRAVENOUS AS NEEDED
Status: DISCONTINUED | OUTPATIENT
Start: 2024-06-21 | End: 2024-06-21 | Stop reason: SURG

## 2024-06-21 RX ORDER — SODIUM CHLORIDE 9 MG/ML
40 INJECTION, SOLUTION INTRAVENOUS AS NEEDED
Status: DISCONTINUED | OUTPATIENT
Start: 2024-06-21 | End: 2024-06-21 | Stop reason: HOSPADM

## 2024-06-21 RX ORDER — PROPOFOL 10 MG/ML
VIAL (ML) INTRAVENOUS AS NEEDED
Status: DISCONTINUED | OUTPATIENT
Start: 2024-06-21 | End: 2024-06-21 | Stop reason: SURG

## 2024-06-21 RX ORDER — MIDAZOLAM HYDROCHLORIDE 1 MG/ML
0.5 INJECTION INTRAMUSCULAR; INTRAVENOUS
Status: DISCONTINUED | OUTPATIENT
Start: 2024-06-21 | End: 2024-06-21 | Stop reason: HOSPADM

## 2024-06-21 RX ORDER — DEXAMETHASONE SODIUM PHOSPHATE 4 MG/ML
INJECTION, SOLUTION INTRA-ARTICULAR; INTRALESIONAL; INTRAMUSCULAR; INTRAVENOUS; SOFT TISSUE AS NEEDED
Status: DISCONTINUED | OUTPATIENT
Start: 2024-06-21 | End: 2024-06-21 | Stop reason: SURG

## 2024-06-21 RX ORDER — SODIUM CHLORIDE 450 MG/100ML
100 INJECTION, SOLUTION INTRAVENOUS CONTINUOUS
Status: DISCONTINUED | OUTPATIENT
Start: 2024-06-21 | End: 2024-06-21 | Stop reason: HOSPADM

## 2024-06-21 RX ORDER — ROCURONIUM BROMIDE 10 MG/ML
INJECTION, SOLUTION INTRAVENOUS AS NEEDED
Status: DISCONTINUED | OUTPATIENT
Start: 2024-06-21 | End: 2024-06-21 | Stop reason: SURG

## 2024-06-21 RX ORDER — HYDROMORPHONE HYDROCHLORIDE 1 MG/ML
0.5 INJECTION, SOLUTION INTRAMUSCULAR; INTRAVENOUS; SUBCUTANEOUS
Status: DISCONTINUED | OUTPATIENT
Start: 2024-06-21 | End: 2024-06-21 | Stop reason: HOSPADM

## 2024-06-21 RX ORDER — SODIUM CHLORIDE 0.9 % (FLUSH) 0.9 %
10 SYRINGE (ML) INJECTION EVERY 12 HOURS SCHEDULED
Status: DISCONTINUED | OUTPATIENT
Start: 2024-06-21 | End: 2024-06-21 | Stop reason: HOSPADM

## 2024-06-21 RX ORDER — FENTANYL CITRATE 50 UG/ML
50 INJECTION, SOLUTION INTRAMUSCULAR; INTRAVENOUS
Status: DISCONTINUED | OUTPATIENT
Start: 2024-06-21 | End: 2024-06-21 | Stop reason: HOSPADM

## 2024-06-21 RX ORDER — SODIUM CHLORIDE 0.9 % (FLUSH) 0.9 %
1-10 SYRINGE (ML) INJECTION AS NEEDED
Status: DISCONTINUED | OUTPATIENT
Start: 2024-06-21 | End: 2024-06-21 | Stop reason: HOSPADM

## 2024-06-21 RX ORDER — LIDOCAINE HYDROCHLORIDE 10 MG/ML
INJECTION, SOLUTION EPIDURAL; INFILTRATION; INTRACAUDAL; PERINEURAL AS NEEDED
Status: DISCONTINUED | OUTPATIENT
Start: 2024-06-21 | End: 2024-06-21 | Stop reason: SURG

## 2024-06-21 RX ORDER — FENTANYL CITRATE 50 UG/ML
INJECTION, SOLUTION INTRAMUSCULAR; INTRAVENOUS AS NEEDED
Status: DISCONTINUED | OUTPATIENT
Start: 2024-06-21 | End: 2024-06-21 | Stop reason: SURG

## 2024-06-21 RX ORDER — PHENYLEPHRINE HCL IN 0.9% NACL 1 MG/10 ML
SYRINGE (ML) INTRAVENOUS AS NEEDED
Status: DISCONTINUED | OUTPATIENT
Start: 2024-06-21 | End: 2024-06-21 | Stop reason: SURG

## 2024-06-21 RX ORDER — FAMOTIDINE 20 MG/1
20 TABLET, FILM COATED ORAL
Status: COMPLETED | OUTPATIENT
Start: 2024-06-21 | End: 2024-06-21

## 2024-06-21 RX ORDER — MAGNESIUM HYDROXIDE 1200 MG/15ML
LIQUID ORAL AS NEEDED
Status: DISCONTINUED | OUTPATIENT
Start: 2024-06-21 | End: 2024-06-21 | Stop reason: HOSPADM

## 2024-06-21 RX ORDER — OXYCODONE HYDROCHLORIDE AND ACETAMINOPHEN 5; 325 MG/1; MG/1
1 TABLET ORAL 3 TIMES DAILY PRN
Qty: 12 TABLET | Refills: 0 | Status: SHIPPED | OUTPATIENT
Start: 2024-06-21

## 2024-06-21 RX ORDER — NALOXONE HYDROCHLORIDE 4 MG/.1ML
SPRAY NASAL
Qty: 2 EACH | Refills: 0 | Status: SHIPPED | OUTPATIENT
Start: 2024-06-21

## 2024-06-21 RX ORDER — LIDOCAINE HYDROCHLORIDE 10 MG/ML
0.5 INJECTION, SOLUTION EPIDURAL; INFILTRATION; INTRACAUDAL; PERINEURAL ONCE AS NEEDED
Status: DISCONTINUED | OUTPATIENT
Start: 2024-06-21 | End: 2024-06-21 | Stop reason: HOSPADM

## 2024-06-21 RX ORDER — BUPIVACAINE HYDROCHLORIDE AND EPINEPHRINE 2.5; 5 MG/ML; UG/ML
INJECTION, SOLUTION EPIDURAL; INFILTRATION; INTRACAUDAL; PERINEURAL AS NEEDED
Status: DISCONTINUED | OUTPATIENT
Start: 2024-06-21 | End: 2024-06-21 | Stop reason: HOSPADM

## 2024-06-21 RX ADMIN — Medication 200 MCG: at 07:16

## 2024-06-21 RX ADMIN — Medication 100 MCG: at 07:07

## 2024-06-21 RX ADMIN — PROPOFOL 150 MG: 10 INJECTION, EMULSION INTRAVENOUS at 06:57

## 2024-06-21 RX ADMIN — PHENYLEPHRINE HYDROCHLORIDE 30 MCG/MIN: 10 INJECTION INTRAVENOUS at 07:20

## 2024-06-21 RX ADMIN — ROCURONIUM BROMIDE 50 MG: 10 INJECTION INTRAVENOUS at 06:57

## 2024-06-21 RX ADMIN — SUGAMMADEX 200 MG: 100 INJECTION, SOLUTION INTRAVENOUS at 08:07

## 2024-06-21 RX ADMIN — Medication 100 MCG: at 07:09

## 2024-06-21 RX ADMIN — DEXAMETHASONE SODIUM PHOSPHATE 4 MG: 4 INJECTION INTRA-ARTICULAR; INTRALESIONAL; INTRAMUSCULAR; INTRAVENOUS; SOFT TISSUE at 07:05

## 2024-06-21 RX ADMIN — PROPOFOL 25 MCG/KG/MIN: 10 INJECTION, EMULSION INTRAVENOUS at 07:05

## 2024-06-21 RX ADMIN — ROCURONIUM BROMIDE 10 MG: 10 INJECTION INTRAVENOUS at 07:25

## 2024-06-21 RX ADMIN — FENTANYL CITRATE 25 MCG: 50 INJECTION, SOLUTION INTRAMUSCULAR; INTRAVENOUS at 07:45

## 2024-06-21 RX ADMIN — ONDANSETRON 4 MG: 2 INJECTION INTRAMUSCULAR; INTRAVENOUS at 07:53

## 2024-06-21 RX ADMIN — FENTANYL CITRATE 50 MCG: 50 INJECTION, SOLUTION INTRAMUSCULAR; INTRAVENOUS at 06:57

## 2024-06-21 RX ADMIN — FENTANYL CITRATE 25 MCG: 50 INJECTION, SOLUTION INTRAMUSCULAR; INTRAVENOUS at 07:31

## 2024-06-21 RX ADMIN — NEBIVOLOL 5 MG: 5 TABLET ORAL at 06:39

## 2024-06-21 RX ADMIN — SODIUM CHLORIDE: 9 INJECTION, SOLUTION INTRAVENOUS at 06:53

## 2024-06-21 RX ADMIN — SODIUM CHLORIDE 2 G: 900 INJECTION INTRAVENOUS at 07:04

## 2024-06-21 RX ADMIN — LIDOCAINE HYDROCHLORIDE 50 MG: 10 INJECTION, SOLUTION EPIDURAL; INFILTRATION; INTRACAUDAL; PERINEURAL at 06:57

## 2024-06-21 RX ADMIN — SUGAMMADEX 200 MG: 100 INJECTION, SOLUTION INTRAVENOUS at 07:59

## 2024-06-21 RX ADMIN — FAMOTIDINE 20 MG: 20 TABLET, FILM COATED ORAL at 06:39

## 2024-06-21 RX ADMIN — ROCURONIUM BROMIDE 10 MG: 10 INJECTION INTRAVENOUS at 07:48

## 2024-06-21 NOTE — ANESTHESIA PROCEDURE NOTES
Airway  Urgency: elective    Date/Time: 6/21/2024 7:01 AM  Airway not difficult    General Information and Staff    Patient location during procedure: OR  CRNA/CAA: Anna Spence, JEVON    Indications and Patient Condition  Indications for airway management: airway protection    Preoxygenated: yes  MILS not maintained throughout  Mask difficulty assessment: 2 - vent by mask + OA or adjuvant +/- NMBA    Final Airway Details  Final airway type: endotracheal airway      Successful airway: ETT  Cuffed: yes   Successful intubation technique: direct laryngoscopy  Facilitating devices/methods: intubating stylet  Endotracheal tube insertion site: oral  Blade: Maricruz  Blade size: 4  ETT size (mm): 7.5  Cormack-Lehane Classification: grade I - full view of glottis  Placement verified by: chest auscultation and capnometry   Cuff volume (mL): 8  Measured from: lips  ETT/EBT  to lips (cm): 22  Number of attempts at approach: 1  Assessment: lips, teeth, and gum same as pre-op and atraumatic intubation    Additional Comments  Negative epigastric sounds, Breath sound equal bilaterally with symmetric chest rise and fall

## 2024-06-21 NOTE — H&P
"   Olga Edward PA-C   Physician Assistant  Specialty: Physician Assistant     H&P      Signed     Encounter Date: 6/11/2024   Related encounter: Office Visit from 6/11/2024 in [facility] NEUROSURGERY with Olga Edward PA-C     Signed               Subjective  Chief Complaint: back and left leg pain      Patient ID: Luis Gamboa is a 79 y.o. male is here today for follow-up.     History of Present Illness  Mr Gamboa is a 78 yo retired  who had seen Dr Dillard in April of this year for severe back pain extending into the L5 distribution of his left lower extremity. He has had low grade problems over the years in this leg, but in Feb of this year it became much worse.  Dr. Dillard started him on gabapentin, which he has been taking, and referred him for PT. He had to wait a couple weeks to start PT and had only had two visits by early May, so he cancelled his scheduled follow up with Dr. Dillard. He was actually doing better with PT and gabapentin, but then in late May developed COVID with a severe cough. The coughing made his back and leg pain much worse and over the weekend it escalated to a \"10+\" level of pain. He called the office yesterday for an appointment.   Today, he states that the pain is in the same distribution: left buttock, hip, and lateral thigh down to the lateral calf, stopping at the ankle.  He denies right leg symptoms, bowel or bladder dysfunction. Pain is worse with walking and standing. It is present all the time. He can get some mild relief lying on the couch in his right side with a pillow behind his back.  He has not had any falls, but does feel that his left leg will give out on him and uses a walker at home or wheelchair if he is out. He has not shirin able to continue PT since his pain worsened. He was taking some naproxen with a bit of relief, but does not have anything stronger at home. He is quite miserable     Past medical history is remarkable " for extensive cardiac history including bradycardia,labile hypertension,  paroxysmal a fib and history of DVT. He had been on coumadin in the past, but had a watchman device implant in 11/23 and now is only on aspirin. His most recent stress test was in March 2024. He also has Chron's disease, glaucoma, and ZAY with CPap use.      The following portions of the patient's history were reviewed and updated as appropriate: allergies, current medications, past family history, past medical history, past social history, past surgical history and problem list.  Medical History           Past Medical History:   Diagnosis Date    Allergic rhinitis 11/03/2016     Intermittent    Atrial fibrillation      Chronic anxiety 11/03/2016     Chronic intermittent anxiety/remote depression.     Chronic insomnia 11/03/2016    Colostomy in place      Crohn's disease 11/03/2016     Crohn’s disease with partial colectomy and colostomy - data deficit.     DVT (deep venous thrombosis)       Remote left calf DVT/Coumadin therapy - data deficit.    Dyslipidemia 11/03/2016     Moderate    Enlarged prostate      Glaucoma      Hiatal hernia 11/03/2016     probable GERD, April 2009.    Hyperlipidemia      Hypertensive cardiovascular disease 11/03/2016     Probable hypertensive cardiovascular disease: Remote progressive exertional dyspnea/chest pain syndrome with acceptable GXT and borderline abnormal acceptable Holter monitor, April 2009. Recurrent progressive NYHA class III symptoms with abnormal EKG and acceptable chest x-ray with essentially normal right dominant coronary artery and preserved systolic LV dysfunction, LVEF (0.66) with continued m    Labile hypertension 11/03/2016    Myalgia due to statin 01/03/2024    Obstructive sleep apnea on CPAP 11/03/2016     Obstructive sleep apnea with intermittent CPAP therapy (did not tolerate).  --PATIENT STATES THE DOES NOT USE MACHINE ALL THE TIME    Vitamin deficiency       Chronic vitamin deficiency  secondary to Crohn’s disease.           Surgical History             Past Surgical History:   Procedure Laterality Date    ATRIAL APPENDAGE EXCLUSION LEFT WITH TRANSESOPHAGEAL ECHOCARDIOGRAM N/A 2023     Procedure: Atrial Appendage Occlusion;  Surgeon: Tevin Esparza MD;  Location: [facility] INVASIVE LOCATION;  Service: Cardiology;  Laterality: N/A;    CARDIOVERSION         2023 PER DR. VICKERS    COLECTOMY PARTIAL / TOTAL        with colostomy    HAND SURGERY        secondary to trauma            Family history:             Family History   Problem Relation Age of Onset    No Known Problems Mother      No Known Problems Father           Social history:   Social History   Social History                Socioeconomic History    Marital status:    Tobacco Use    Smoking status: Former       Current packs/day: 0.00       Types: Cigarettes       Quit date:        Years since quittin.4       Passive exposure: Never    Smokeless tobacco: Never   Vaping Use    Vaping status: Never Used   Substance and Sexual Activity    Alcohol use: No    Drug use: No    Sexual activity: Defer            Review of Systems   Constitutional:  Negative for activity change, appetite change, chills, diaphoresis, fatigue, fever and unexpected weight change.   HENT:  Negative for congestion, dental problem, drooling, ear discharge, ear pain, facial swelling, hearing loss, mouth sores, nosebleeds, postnasal drip, rhinorrhea, sinus pressure, sinus pain, sneezing, sore throat, tinnitus, trouble swallowing and voice change.    Eyes:  Negative for photophobia, pain, discharge, redness, itching and visual disturbance.   Respiratory:  Negative for apnea, cough, choking, chest tightness, shortness of breath, wheezing and stridor.    Cardiovascular:  Negative for chest pain, palpitations and leg swelling.   Gastrointestinal:  Negative for abdominal distention, abdominal pain, anal bleeding, blood in stool, constipation,  "diarrhea, nausea, rectal pain and vomiting.   Endocrine: Negative for cold intolerance, heat intolerance, polydipsia, polyphagia and polyuria.   Genitourinary:  Negative for decreased urine volume, difficulty urinating, dysuria, enuresis, flank pain, frequency, genital sores, hematuria and urgency.   Musculoskeletal:  Positive for back pain. Negative for arthralgias, gait problem, joint swelling, myalgias, neck pain and neck stiffness.   Skin:  Negative for color change, pallor, rash and wound.   Allergic/Immunologic: Negative for environmental allergies, food allergies and immunocompromised state.   Neurological:  Negative for dizziness, tremors, seizures, syncope, facial asymmetry, speech difficulty, weakness, light-headedness, numbness and headaches.   Hematological:  Negative for adenopathy. Does not bruise/bleed easily.   Psychiatric/Behavioral:  Negative for agitation, behavioral problems, confusion, decreased concentration, dysphoric mood, hallucinations, self-injury, sleep disturbance and suicidal ideas. The patient is not nervous/anxious and is not hyperactive.                Objective  Blood pressure 102/60, temperature 97.1 °F (36.2 °C), temperature source Infrared, height 177.8 cm (70\"), weight 88.9 kg (196 lb).  Body mass index is 28.12 kg/m².     Physical Exam  CONSTITUTIONAL:   - Patient is well-nourished. He is anxious and in significant discomfort, sitting on his right hip with left leg extended   - Pleasant and appears stated age.  PSYCHIATRIC:  - Normal mood and affect  - Behavior is normal.  HENT:   Head: Normocephalic and Atraumatic.   Eyes:     - Pupils are equal, round, and reactive to light.     - EOM are normal.   CV:   - Regular rate and rhythm on palpable radial pulse   PULMONARY:   - Speaking in full sentences, breathing non labored  - No wheezing   SKIN:  - Clean, dry and intact   MUSCULOSKELETAL:  - Neck/Back tenderness to palpation is not observed.   - Strength is intact in the upper " and lower extremities to direct testing. He is pain limited in hip flexion on the left.   - Muscle tone is normal throughout.  - gait was not tested today  - ROM in neck/back is normal.  - Straight leg raise is positive even in a sitting position   NEUROLOGICAL:  - A&Ox3  - Attention span, language function, and cognition are intact.  - Sensation is intact to light touch testing throughout.  - Deep tendon reflexes are 1+ and symmetrical.    - Coordination is intact.     Patient's Body mass index is 28.14 kg/m². indicating that he is moderately overweight     MRI of the lumbar spine dated 3/27/2024 demonstrates a large,flattened disc extrusion on the left at L4/5. This has migrated into the lateral recess in these films. The images were reviewed with the patient and his wife, who is with him today and the findings were described at length.                Assessment & Plan  Mr Gamboa has a large disc herniation at L4/5 on the left, which corresponds to his distribution of left leg pain. He initially had a slight improvement in his pain with conservative treatment  but this has become much worse over the last couple of weeks. Gabapentin is no longer helping and he is unable to participate with physical therapy.   Dr Dillard had discussed the possible need for discectomy at this level. We discussed the procedure in detail today including risks of bleeding, dural tear, nerve injury or paralysis. He is also higher risk due to his cardiac history and will need to obtain cardiac clearance prior to surgery. Risks, benefits, and alternatives were discussed and he is eager to proceed with surgery. We will prescribe tramadol as well as naproxen to be used sparingly and will plan to get him scheduled for L4/5 discectomy promptly     Diagnoses and all orders for this visit:     1. Paroxysmal atrial fibrillation (Primary)     2. Lumbar disc herniation with radiculopathy     3. Chest pain, atypical     4. Labile hypertension            No follow-ups on file.                 This document signed by Olga Edward PA-C  June 11, 2024 11:59 EDT                        Routing History  [facility] Pre-op    Full history and physical note from office is up to date.     There were no vitals taken for this visit.  Patient denies allergy to contrast dye or latex  IMM:  Influenza: Yes  Pneumococcal: Yes  Tetanus: Up-to-date  Lungs: Clear to auscultation bilaterally bases  Cardiovascular: S1-S2 without rubs murmurs or gallops.  It is an irregularly irregular rhythm.  Abdomen: Soft, nontender, bowel sounds present throughout.    LAB Results:  Lab Results   Component Value Date    WBC 5.20 06/18/2024    HGB 14.5 06/18/2024    HCT 42.7 06/18/2024    MCV 96.6 06/18/2024     (L) 06/18/2024    NEUTROABS 3.70 06/18/2024    GLUCOSE 103 (H) 06/18/2024    BUN 22 06/18/2024    CREATININE 1.11 06/18/2024    EGFRIFNONA 49 12/17/2021    EGFRIFAFRI 60 12/17/2021     06/18/2024    K 4.5 06/18/2024    CL 99 06/18/2024    CO2 29.0 06/18/2024    CALCIUM 9.1 06/18/2024    ALBUMIN 3.5 06/18/2024    AST 49 (H) 06/18/2024    ALT 33 06/18/2024    BILITOT 0.7 06/18/2024    INR 1.10 11/08/2023       Cancer Staging (if applicable)  Cancer Patient: __ yes __no __unknown__N/A; If yes, clinical stage T:__ N:__M:__, stage group or __N/A  Impression: Neurogenic claudication left lower extremity  Degenerative disc disease  Spinal stenosis  Atrial fibrillation  Status post watchman placement.  Obstructive sleep apnea on CPAP  Plan: Lumbar discectomy L4-5 on the left    PAULO Penn   06/21/24   6:32 AM EDT

## 2024-06-21 NOTE — ANESTHESIA PREPROCEDURE EVALUATION
Anesthesia Evaluation     Patient summary reviewed and Nursing notes reviewed   no history of anesthetic complications:   NPO Solid Status: > 8 hours  NPO Liquid Status: > 2 hours           Airway   Mallampati: II  TM distance: >3 FB  Neck ROM: full  No difficulty expected  Dental - normal exam     Pulmonary - normal exam    breath sounds clear to auscultation  (+) a smoker ([place] 1982) Former,sleep apnea on CPAP  Cardiovascular - normal exam    ECG reviewed  Patient on routine beta blocker and Beta blocker given within 24 hours of surgery  Rhythm: regular  Rate: normal    (+) hypertension, valvular problems/murmurs (Mild to Moderate MR), dysrhythmias (Afib s/p watchman) Atrial Fib, DVT, hyperlipidemia    ROS comment: ARCHIE 01/2024  ·  27 mm Watchman FLX atrial appendage occlusion device in place.  There is a small jet of periprosthetic flow measuring proximately 2-3 mm.  This is consistent with adequate left atrial appendage occlusion.  ·  Left ventricular ejection fraction appears to be 51 - 55%.  ·  Mild to moderate mitral valve regurgitation is present.  ·  There is severe, (grade 4) plaque in the aortic arch present.      EKG 4/10/24:  HR 84  Atrial flutter  Nonspecific ST abnormality  Abnormal ECG  When compared with ECG of 16-JAN-2023 12:19,  Vent. rate has increased BY  36 BPM    ECHO 12/2022:  ·  Left ventricular systolic function is normal. Estimated left ventricular EF = 55%  ·  Left ventricular wall thickness is consistent with hypertrophy.  ·  Left atrial volume is mildly increased.  ·  The cardiac valves are anatomically and functionally normal.  ·  Estimated right ventricular systolic pressure from tricuspid regurgitation is normal (<35 mmHg).  ·  Atrial fibrillation noted throughout study         Neuro/Psych  (+) psychiatric history Anxiety  GI/Hepatic/Renal/Endo    (+) hiatal hernia, GERD, renal disease- CRI    ROS Comment: Crohn's Disease    Musculoskeletal     (+) back pain, radiculopathy  Abdominal     Substance History      OB/GYN          Other   blood dyscrasia thrombocytopenia,                       Anesthesia Plan    ASA 3     general     intravenous induction     Anesthetic plan, risks, benefits, and alternatives have been provided, discussed and informed consent has been obtained with: patient.    Use of blood products discussed with patient  Consented to blood products.    Plan discussed with CRNA.        CODE STATUS:

## 2024-06-21 NOTE — ANESTHESIA POSTPROCEDURE EVALUATION
Patient: Luis Gamboa    Procedure Summary       Date: 06/21/24 Room / Location:  [facility]  [facility]    Anesthesia Start: 0653 Anesthesia Stop: 0819    Procedure: LUMBAR DISCECTOMY L 4-5 (Left: Spine Lumbar) Diagnosis:       Lumbar disc herniation with radiculopathy      (Lumbar disc herniation with radiculopathy [M51.16])    Surgeons: Dave Dillard MD Provider: aRz Goddard MD    Anesthesia Type: general ASA Status: 3            Anesthesia Type: general    Vitals  Vitals Value Taken Time   /92    Temp 97.6    Pulse 115 06/21/24 0818   Resp     SpO2 94 % 06/21/24 0818   Vitals shown include unfiled device data.        Post Anesthesia Care and Evaluation    Patient location during evaluation: PACU  Patient participation: waiting for patient participation  Level of consciousness: sleepy but conscious  Pain management: adequate    Airway patency: patent  Anesthetic complications: No anesthetic complications  PONV Status: none  Cardiovascular status: hemodynamically stable and acceptable  Respiratory status: nonlabored ventilation, acceptable, nasal cannula and oral airway  Hydration status: acceptable

## 2024-06-21 NOTE — OP NOTE
NEUROSURGICAL OPERATIVE NOTE        PREOPERATIVE DIAGNOSIS:    Left L4-5 disc herniation with radiculopathy      POSTOPERATIVE DIAGNOSIS:  Same      PROCEDURE:  Left L4-5 laminotomy, medial facetectomy, foraminotomy, and discectomy      SURGEON:  Dave Dillard M.D.      ASSISTANT: Regina Kurtz PA-C    PAC assisted with:   Suctioning   Retraction   Tying   Suturing   Closing   Application of dressing   Skilled neurosurgery PA assistance was necessary to perform this procedure.        ANESTHESIA:  General      ESTIMATED BLOOD LOSS: Minimal      SPECIMEN: None      DRAINS: None      COMPLICATIONS:  None      CLINICAL NOTE:  The patient is a 79-year-old gentleman with ongoing and progressive back and severe left leg pain.  This has become unmanageable.  Studies demonstrate disc protrusion to the left at L4-5 that provides clinical correlation.  As such, he presents at this time for lumbar discectomy.  The nature of the procedure as well as the potential risks, complications, limitations, and alternatives to the procedure were discussed at length with the patient and the patient has agreed to proceed with surgery.      TECHNICAL NOTE:  The patient was brought to the operating room and while on his cart general endotracheal anesthesia was achieved. He was then turned prone onto the [place] saddle frame. Special care was ensured to protect pressure points. His low back was prepared and draped in the usual fashion. A localizing radiograph was obtained with a spinal needle in the lumbosacral midline. Based on this, a 2.5 to 3 cm vertical incision was fashioned overlying the L4-5 interspace. Underlying tissues were divided with cautery to provide exposure to the right L4 and L5 hemilamina. Laminotomy was fashioned. Another radiograph confirmed the operative level. The medial aspect of the facet was taken down with drill and Kerrison punches. Thickened interlaminar ligament was removed. The dural sac and nerve root  were retracted over subligamentous disc herniation. I entered the disc and noted that a lot of the ligament was boggy and thickened; some more friable, gelatinous type of disc material was teased out and delivered. Foraminotomy was performed with a Kerrison punch to provide additional decompression of the nerve root. Ultimately a blunt ball probe was utilized to probe more medially and help deliver a bit more disc into the field to be removed. The disc space was entered and additional friable disc material was removed. At completion of procedure the dural sac and nerve root were well decompressed. I probed in all directions and further disc material was not identified. Modest bleeding points were controlled with bipolar cautery and Floseal. With a Valsalva maneuver, there was no significant bleeding or evidence of CSF leak. The wound was washed out with a saline solution. A small portion of Gelfoam soaked in Depo-Medrol was then placed over the exposed dura. The paraspinous muscle and fascia were reapproximated in interrupted fashion with #0 Vicryl suture. Then 0.25% Marcaine was instilled into the paraspinous musculature and subcutaneous tissues. The subcutaneous tissues were closed in layers with 3-0 Vicryl suture. The skin was closed in a running subcuticular fashion with 3-0 Vicryl suture. A dermal sealant and sterile dressing were applied. The patient was rolled onto his cart, extubated, and taken to the recovery room in satisfactory condition. There were no overt intraoperative complications.             Dave Dillard M.D.

## 2024-07-03 DIAGNOSIS — I48.19 ATRIAL FIBRILLATION, PERSISTENT: Primary | ICD-10-CM

## 2024-07-03 DIAGNOSIS — Z95.818 PRESENCE OF WATCHMAN LEFT ATRIAL APPENDAGE CLOSURE DEVICE: ICD-10-CM

## 2024-07-12 ENCOUNTER — OFFICE VISIT (OUTPATIENT)
Dept: NEUROSURGERY | Facility: CLINIC | Age: 79
End: 2024-07-12
Payer: MEDICARE

## 2024-07-12 VITALS — HEIGHT: 70 IN | WEIGHT: 192 LBS | TEMPERATURE: 97.1 F | BODY MASS INDEX: 27.49 KG/M2

## 2024-07-12 DIAGNOSIS — M51.16 LUMBAR DISC HERNIATION WITH RADICULOPATHY: Primary | ICD-10-CM

## 2024-07-12 NOTE — PROGRESS NOTES
Subjective     Chief Complaint: left leg pain s/p discectomy    Patient ID: Luis Gamboa is a 79 y.o. male is here today for follow-up.    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 Studies demonstrated large disc protrusion to the left at L4-5 that provided clinical correlation and on 24 he underwent discectomy at this level. Several migrated fragments were removed. Surgery was uncomplicated and he was discharged to home from recover  Today, he states that he is doing much much better and he is very grateful. He does still have a bit of pain down his left leg when he bends forward, like at the sink or in the shower, but overall is doing well. He is using a cane but is able to walk without it. He denies fever, chills, or problems with his incision    The following portions of the patient's history were reviewed and updated as appropriate: allergies, current medications, past family history, past medical history, past social history, past surgical history and problem list.    Family history:   Family History   Problem Relation Age of Onset    No Known Problems Mother     No Known Problems Father        Social history:   Social History     Socioeconomic History    Marital status:    Tobacco Use    Smoking status: Former     Current packs/day: 0.00     Types: Cigarettes     Quit date:      Years since quittin.5     Passive exposure: Never    Smokeless tobacco: Never   Vaping Use    Vaping status: Never Used   Substance and Sexual Activity    Alcohol use: No    Drug use: No    Sexual activity: Defer       Review of Systems   Constitutional:  Negative for activity change, appetite change, chills, diaphoresis, fatigue, fever and unexpected weight change.   HENT:  Negative for congestion, dental problem, drooling, ear discharge, ear pain,  "facial swelling, hearing loss, mouth sores, nosebleeds, postnasal drip, rhinorrhea, sinus pressure, sneezing, sore throat, tinnitus, trouble swallowing and voice change.    Eyes:  Negative for photophobia, pain, discharge, redness, itching and visual disturbance.   Respiratory:  Negative for apnea, cough, choking, chest tightness, shortness of breath, wheezing and stridor.    Cardiovascular:  Negative for chest pain, palpitations and leg swelling.   Gastrointestinal:  Negative for abdominal distention, abdominal pain, anal bleeding, blood in stool, constipation, diarrhea, nausea, rectal pain and vomiting.   Endocrine: Negative for cold intolerance, heat intolerance, polydipsia, polyphagia and polyuria.   Genitourinary:  Negative for decreased urine volume, difficulty urinating, dysuria, enuresis, flank pain, frequency, genital sores, hematuria, penile discharge, penile pain, penile swelling, scrotal swelling, testicular pain and urgency.   Musculoskeletal:  Positive for back pain. Negative for arthralgias, gait problem, joint swelling, myalgias, neck pain and neck stiffness.   Skin:  Negative for color change, pallor, rash and wound.   Allergic/Immunologic: Negative for environmental allergies, food allergies and immunocompromised state.   Neurological:  Negative for dizziness, tremors, seizures, syncope, facial asymmetry, speech difficulty, weakness, light-headedness, numbness and headaches.   Hematological:  Negative for adenopathy. Does not bruise/bleed easily.   Psychiatric/Behavioral:  Negative for agitation, behavioral problems, confusion, decreased concentration, dysphoric mood, hallucinations, self-injury, sleep disturbance and suicidal ideas. The patient is not nervous/anxious and is not hyperactive.    All other systems reviewed and are negative.      Objective   Temperature 97.1 °F (36.2 °C), temperature source Temporal, height 177.8 cm (70\"), weight 87.1 kg (192 lb).  Body mass index is 27.55 " kg/m².    Physical Exam  Constitutional:       Appearance: Normal appearance.   Eyes:      Pupils: Pupils are equal, round, and reactive to light.   Cardiovascular:      Pulses: Normal pulses.   ____Pulmonary:      Effort: Pulmonary effort is normal.      Breath sounds: Normal breath sounds.   Musculoskeletal:      Comments: Gait slow and mildly antalgic, but steady and independent with no foot drop.    Skin:     Comments: Incision clean, dry, and intact with no swelling, tenderness, or erythema. It has healed very well and is completely closed    _Neurological:      General: No focal deficit present.      Mental Status:   alert and oriented to person, place, and time.   Psychiatric:         Mood and Affect: Mood normal.         Behavior: Behavior normal.    Assessment & Plan   Mr Gamboa is doing well. We discussed activities. He is eager to get back to farming but we discussed the need to very slowly relax his restrictions. He was miserable enough before surgery that he is more likely to be complaint with this plan. We will schedule a follow up for 5-6 weeks, sooner prn    Diagnoses and all orders for this visit:    1. Lumbar disc herniation with radiculopathy (Primary)        Return in about 5 weeks (around 8/16/2024).       This document signed by Olga Edward PA-C  July 12, 2024 11:48 EDT

## 2024-07-25 DIAGNOSIS — M54.16 LUMBAR RADICULOPATHY: ICD-10-CM

## 2024-07-25 DIAGNOSIS — M51.16 LUMBAR DISC HERNIATION WITH RADICULOPATHY: ICD-10-CM

## 2024-07-25 RX ORDER — GABAPENTIN 300 MG/1
300 CAPSULE ORAL 3 TIMES DAILY
Qty: 90 CAPSULE | Refills: 0 | Status: SHIPPED | OUTPATIENT
Start: 2024-07-25

## 2024-08-05 NOTE — PLAN OF CARE
General Surgery Initial Consult    Patient: Rick Pittman MRN: [id number]  SSN: [id number]    YOB: 1983  Age: 41 y.o.  Sex: male      Subjective:      CC: right groin bulge    Rick Pittman is a 41 y.o. male who is being seen for right inguinal hernia. He first noticed this bulge a couple years ago. He was struggling to lose weight previously, and with exercise and weight loss, noticed a bulge in the right groin. He thinks the bulge has been more noticeable due to weight loss. Denies pain, obstructive symptoms (nausea, vomiting, bloating, changes in bowel habits). He notices discomfort at the workplace (works as a ). The bulge is more noticeable with standing and bearing down. It has always been reducible to date. Denies urinary symptoms.     Allergies   Allergen Reactions    Penicillins Nausea And Vomiting     Haven't had since a child.        No past medical history on file.  Past Surgical History:   Procedure Laterality Date    APPENDECTOMY  06/01/1999      No current outpatient medications on file.     No current facility-administered medications for this visit.     Social History     Tobacco Use    Smoking status: Never     Passive exposure: Never    Smokeless tobacco: Never   Substance Use Topics    Alcohol use: Yes     Alcohol/week: 1.0 standard drink of alcohol     Types: 1 Drinks containing 0.5 oz of alcohol per week     Comment: occ     Family History   Family history unknown: Yes           Review of Systems:  Constitutional: No fever or chills  Neurologic: No headache  Eyes: No scleral icterus or irritated eyes  Nose: No nasal pain or drainage  Mouth: No oral lesions or sore throat  Cardiac: No palpations or chest pain  Pulmonary: No cough or shortness of breath  Gastrointestinal: No nausea, emesis, diarrhea, or constipation  Genitourinary: No dysuria  Musculoskeletal: No muscle or joint tenderness  Skin: No rashes or lesions  Psychiatric: No anxiety or depressed  Goal Outcome Evaluation:  Plan of Care Reviewed With: patient, spouse        Progress: improving  Outcome Evaluation: PT eval is completed. patient presents with CHF exacerbation. patient demonstrates impaired bed mobility transfers and gait compared to baseline status patient continues to be hypertensive  RN had just given BP meds prior to activity. anticipate patient to be able to go back home at D/C with wife's assist.      Anticipated Discharge Disposition (PT): home with assist

## 2024-08-07 ENCOUNTER — OFFICE VISIT (OUTPATIENT)
Dept: CARDIOLOGY | Facility: CLINIC | Age: 79
End: 2024-08-07
Payer: MEDICARE

## 2024-08-07 VITALS
HEIGHT: 70 IN | SYSTOLIC BLOOD PRESSURE: 96 MMHG | BODY MASS INDEX: 27.77 KG/M2 | OXYGEN SATURATION: 99 % | HEART RATE: 78 BPM | DIASTOLIC BLOOD PRESSURE: 61 MMHG | WEIGHT: 194 LBS

## 2024-08-07 DIAGNOSIS — I10 ESSENTIAL HYPERTENSION: ICD-10-CM

## 2024-08-07 DIAGNOSIS — E78.5 DYSLIPIDEMIA: ICD-10-CM

## 2024-08-07 DIAGNOSIS — I48.19 ATRIAL FIBRILLATION, PERSISTENT: ICD-10-CM

## 2024-08-07 DIAGNOSIS — I11.9 HYPERTENSIVE HEART DISEASE WITHOUT HEART FAILURE: Primary | ICD-10-CM

## 2024-08-07 PROCEDURE — 1160F RVW MEDS BY RX/DR IN RCRD: CPT | Performed by: NURSE PRACTITIONER

## 2024-08-07 PROCEDURE — 99214 OFFICE O/P EST MOD 30 MIN: CPT | Performed by: NURSE PRACTITIONER

## 2024-08-07 PROCEDURE — 1159F MED LIST DOCD IN RCRD: CPT | Performed by: NURSE PRACTITIONER

## 2024-08-07 PROCEDURE — 3074F SYST BP LT 130 MM HG: CPT | Performed by: NURSE PRACTITIONER

## 2024-08-07 PROCEDURE — 3078F DIAST BP <80 MM HG: CPT | Performed by: NURSE PRACTITIONER

## 2024-08-07 RX ORDER — AMLODIPINE BESYLATE 5 MG/1
5 TABLET ORAL
Qty: 90 TABLET | Refills: 2 | Status: SHIPPED | OUTPATIENT
Start: 2024-08-07

## 2024-08-07 NOTE — PROGRESS NOTES
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      INTERNIST: Anand De Souza MD  VA PHYSICIAN: Kay Cannon MD  COLORECTAL SURGEON: Alfonso Lester MD  IMMUNOLOGIST:  Deysi Smith MD .  ONCOLOGIST: Adam Ponce MD.    Chief Complaint:   Chief Complaint   Patient presents with    Atrial fibrillation, persistent     Problem List:  Probable hypertensive cardiovascular disease:  Remote progressive exertional dyspnea/chest pain syndrome with acceptable GXT and borderline abnormal acceptable Holter monitor, April 2009.  Recurrent progressive NYHA class III symptoms with abnormal EKG and acceptable chest x-ray with essentially normal right dominant coronary artery and preserved systolic LV dysfunction, LVEF (0.66) with continued medical therapy felt warranted, July 2009.   CCS class 0 chest discomfort/NYHA class II-III fatigue symptoms with abnormal monitor demonstrating bradycardia, August 2019.   Stress echocardiogram 9/12/2019: Estimated EF = 65%.Left ventricular systolic function is normal.There is no evidence of pericardial effusion.Acceptable negative echocardiographic exercise stress test without anginal type chest discomfort, definitive ischemic EKG changes, or echocardiographic regional wall motion abnormality with preserved systolic left ventricular function following exercise to 85% predicted maximal heart rate and 88% predicted exercise capacity.Overall, current study suggests low probability for significant focal obstructive coronary artery disease with preserved systolic left ventricular function  Echocardiogram 12/15/2022: LVEF 55%, LV wall thickness consistent with hypertrophy, cardiac valves are anatomically and functionally normal, RVSP less than 35 mmHg, atrial fibrillation noted throughout study  Regadenoson stress test 3/6/2024:  LVEF 57%, normal myocardial perfusion study with no evidence of ischemia, scant CAC on CT attenuation correction images  Residual class I symptoms, August 2020, March 2021, October 2021, November 2022, May 2023, December 2023, March 2024, June 2024  Bradycardia with Holter monitor 8/20/2019: Average heart rate was 52 bpm, heart rate less than 50 bpm 68% the time but there were no pauses and only 9 PVCs and 2200 PACs which represent 3% total heartbeats with no atrial fibrillation  Moderate dyslipidemia; intolerant to statins.   Labile hypertension.  Mildly overweight, BMI 28.90.  Obstructive sleep apnea with intermittent CPAP therapy (did not tolerate).   Hiatal hernia/probable GERD, April 2009.  Remote left calf DVT/Coumadin therapy - data deficit.   Chronic lower tract obstructive symptoms/probable BPH.   Crohn’s disease with partial colectomy and colostomy - data deficit.   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   Chronic intermittent anxiety/remote depression.   Chronic insomnia.   Intermittent allergic rhinitis.   Retroperitoneal schwannoma: Abnormal CT abdomen/pelvis demonstrating a 3.2 x 4.2 cm mass in the leftward retroperitoneum with attenuation coefficient consistent with a solid, noncystic mass, September 2019, 3.6 x 4.8 cm on CT abdomen/pelvis April 2024  Paroxysmal atrial fibrillation  Diagnosed November 2022 in office, asymptomatic  CHADsVasc 3, started Eliquis  Holter monitor showed 100% atrial fib burden with heart rate ranged between  bpm with average 83 bpm with occasional PVCs  Successful external cardioversion 1/16/2023  Mobile cardiac outpatient telemetry monitor March 2023 with no evidence of atrial fibrillation, SVT, pauses, heart block, or VT.  Heart rate ranged between  bpm with average 61 bpm, 9% PAC burden, less than 1% PVC burden  27 mm Watchman FLX device implant 11/13/2023, ARCHIE showed EF 51-55%, mild MR, device well-seated with no periprosthetic flow.  ARCHIE  1/3/2024: 27 mm Watchman FLX atrial appendage occlusion device in place.  Small jet of periprosthetic flow measuring approximately 2-3 mm.  This is consistent with adequate NGA occlusion.  LVEF 51-55%, mild to moderate MR, severe grade 4 plaque in the aortic arch present  [facility] 2-day hospitalization April 2024 for dyspnea in the setting of hypertensive urgency.  COVID May 2024  Remote operations:  Partial colectomy with colostomy, 2001.   Hand surgery secondary to trauma, 2004.   Lithotripsy September 2019  Watchman device implant  Lumbar discectomy    Allergies   Allergen Reactions    Crestor [Rosuvastatin] Myalgia    Imuran [Azathioprine] GI Intolerance      hemorrhagic urticaria.  Bleeding        Current Outpatient Medications   Medication Instructions    amLODIPine (NORVASC) 10 mg, Oral, Every 24 Hours Scheduled    aspirin 81 mg, Oral, Daily    Budesonide (ENTOCORT EC) 6 mg, Oral, Take As Directed, Every 4th day    cyclobenzaprine (FLEXERIL) 10 mg, Oral, 2 Times Daily PRN    Evolocumab with Infusor (REPATHA) 420 mg, Subcutaneous, Every 30 Days    finasteride (PROSCAR) 5 mg, Oral, Daily    gabapentin (NEURONTIN) 300 mg, Oral, 3 Times Daily    hydrALAZINE (APRESOLINE) 25 mg, Oral, Every 8 Hours Scheduled    immune globulin, human, 1 GM/5ML solution Subcutaneous, Weekly, Infusion     latanoprost (XALATAN) 0.005 % ophthalmic solution 1 drop, Both Eyes, Nightly    magnesium oxide (MAGOX) 500 mg, Oral, Daily    methylPREDNISolone (MEDROL) 4 MG dose pack Take as directed on package instructions.    Multiple Vitamins-Minerals (MULTIVITAMIN ADULT PO) 1 tablet, Oral, Daily    naloxone (NARCAN) 4 MG/0.1ML nasal spray Call 911. Don't prime. [place] in 1 nostril for overdose. Repeat in 2-3 minutes in other nostril if no or minimal breathing/responsiveness.    naproxen (NAPROSYN) 250 mg, Oral, 2 Times Daily PRN    nebivolol (BYSTOLIC) 5 mg, Oral, Daily    nitroglycerin (NITROSTAT) 0.4 MG SL tablet 1 under the tongue as needed for  angina, may repeat q5mins for up three doses    omeprazole (PRILOSEC) 20 mg, Oral, Daily    ondansetron (ZOFRAN) 4 mg, Oral, As Needed    ondansetron ODT (ZOFRAN-ODT) 4 mg, Oral, Every 6 Hours PRN    oxyCODONE-acetaminophen (PERCOCET) 5-325 MG per tablet 1 tablet, Oral, 3 Times Daily PRN    sertraline (ZOLOFT) 50 mg, Oral, Daily    spironolactone (ALDACTONE) 25 MG tablet Take 0.5 (one-half) tablet by mouth Daily.    tamsulosin (FLOMAX) 0.4 MG capsule 24 hr capsule 1 capsule, Oral, Daily    traMADol (ULTRAM) 50 mg, Oral, Every 6 Hours PRN    vitamin C (ASCORBIC ACID) 500 mg, Oral, Daily    zolpidem (AMBIEN) 10 mg, Oral, Nightly PRN         HISTORY OF PRESENT ILLNESS:  The patient is here earlier than scheduled appointment.  He had a Watchman implanted November 2023.Patient had a cardiac PET March 2024 with no evidence of ischemia.  Patient had COVID mid May 2024.  At his last appointment he had not started taking Repatha because it was too expensive.  He was going to follow-up with the [facility] to see if it was cheaper there.  He is intolerant to statins with myalgias.  The patient says that he thought that they were getting it approved but he has not heard anything back.  He is supposed to go there later this week for vitamin B12 shot and will ask about this. Patient has chronic retroperitoneal soft tissue mass measuring 3.6 x 4.8 cm and is followed by oncology with no plans for intervention at this time.  In the interim the patient had lumbar discectomy surgery June 2024 with Dr. Dillard.  For the first 3 weeks he felt much better and did not have any pain.  He has noticed pain since around mid July and is supposed to follow-up again this week.  He is not attending any physical therapy and is ambulating with a walker.  The patient feels like he has had some weakness and low blood pressures.  He says that generally it is around 107 systolic.  He stopped taking the hydralazine about 2 weeks ago on his own.  He  "denies any chest pain, increased shortness of breath, palpitations, presyncope, or syncope.    ROS   All other systems reviewed and otherwise negative.    Procedures       Objective:       Vitals:    08/07/24 1430 08/07/24 1432   BP: 109/72 96/61   BP Location: Right arm Right arm   Patient Position: Sitting Standing   Pulse: 79 78   SpO2: 93% 99%   Weight: 88 kg (194 lb)    Height: 177.8 cm (70\")      Body mass index is 27.84 kg/m².  Wt Readings from Last 2 Encounters:   08/07/24 88 kg (194 lb)   07/12/24 87.1 kg (192 lb)        Constitutional:       Appearance: Healthy appearance. Not in distress.      Comments: Ambulating with walker   Neck:      Vascular: No JVR. JVD normal.   Pulmonary:      Effort: Pulmonary effort is normal.      Breath sounds: Normal breath sounds. No wheezing. No rhonchi. No rales.   Chest:      Chest wall: Not tender to palpatation.   Cardiovascular:      PMI at left midclavicular line. Normal rate. Irregularly irregular rhythm. Normal S1. Normal S2.       Murmurs: There is a grade 1/6 systolic murmur at the URSB.      No gallop.  No click. No rub.   Pulses:     Intact distal pulses.   Edema:     Peripheral edema absent.   Abdominal:      General: Bowel sounds are normal.      Palpations: Abdomen is soft.      Tenderness: There is no abdominal tenderness.   Musculoskeletal: Normal range of motion.         General: No tenderness. Skin:     General: Skin is warm and dry.   Neurological:      General: No focal deficit present.      Mental Status: Alert and oriented to person, place and time.           Lab Review:   Lab Results   Component Value Date    GLUCOSE 103 (H) 06/18/2024    BUN 22 06/18/2024    CREATININE 1.11 06/18/2024    EGFRIFNONA 49 12/17/2021    EGFRIFAFRI 60 12/17/2021    BCR 19.8 06/18/2024    CO2 29.0 06/18/2024    CALCIUM 9.1 06/18/2024    ALBUMIN 3.5 06/18/2024    AST 49 (H) 06/18/2024    ALT 33 06/18/2024       Lab Results   Component Value Date    WBC 5.20 06/18/2024    " HGB 14.5 06/18/2024    HCT 42.7 06/18/2024    MCV 96.6 06/18/2024     (L) 06/18/2024       Lab Results   Component Value Date    HGBA1C 5.20 06/18/2024       Lab Results   Component Value Date    TSH 1.850 04/11/2024           Results for orders placed during the hospital encounter of 01/03/24    Adult Transesophageal Echo 3D (ARCHIE) W/ Cont If Necessary Per Protocol    Interpretation Summary    27 mm Watchman FLX atrial appendage occlusion device in place.  There is a small jet of periprosthetic flow measuring proximately 2-3 mm.  This is consistent with adequate left atrial appendage occlusion.    Left ventricular ejection fraction appears to be 51 - 55%.    Mild to moderate mitral valve regurgitation is present.    There is severe, (grade 4) plaque in the aortic arch present.            Advance Care Planning   ACP discussion was held with the patient during this visit. Patient does not have an advance directive, declines further assistance.      Assessment:    Patient with fair functional status on limited activity and recent lumbar discectomy June 2024. We will defer additional diagnostic or therapeutic intervention from a cardiac perspective at this time. Patient had a cardiac PET March 2024 with no evidence of ischemia.  Patient's blood pressure has been too low.  Hydralazine will be discontinued permanently.  Amlodipine will be decreased to 5 mg daily.  The patient will monitor his blood pressure for the next week and call back with readings.  He is going to follow-up with the [facility] regarding his Repatha that he has not yet received.  Patient will have a ARCHIE November 2024, 1 year status post Watchman device implant.     Diagnosis Plan   1. Hypertensive heart disease without heart failure  No recurrent angina pectoris or CHF on current activity schedule; continue current treatment       2. Essential hypertension  Hydralazine will be discontinued permanently.  Amlodipine will be decreased to 5 mg  daily.  The patient will monitor his blood pressure for the next week and call back with readings.      3. Atrial fibrillation, persistent  Patient will have a ARCHIE November 2024, 1 year status post Watchman device implant.  Continue nebivolol, aspirin      4. Dyslipidemia  Patient is going to follow-up with the [facility] regarding his Repatha that he has not yet received.  Patient intolerant to statins with myalgias.             Plan:         Patient to continue current medications and close follow up with the above providers.  Tentative cardiology follow up in February 2025 or patient may return sooner PRN.  ARCHIE November 2024, 1 year status post Watchman  Decrease amlodipine to 5 mg daily  Discontinue hydralazine permanently  Patient to call back in 1 week with blood pressure and heart rate readings    Electronically signed by ROSALBA Luo, 08/07/24, 3:26 PM EDT.

## 2024-08-09 ENCOUNTER — OFFICE VISIT (OUTPATIENT)
Dept: NEUROSURGERY | Facility: CLINIC | Age: 79
End: 2024-08-09
Payer: MEDICARE

## 2024-08-09 VITALS
BODY MASS INDEX: 28.26 KG/M2 | TEMPERATURE: 97.1 F | DIASTOLIC BLOOD PRESSURE: 72 MMHG | SYSTOLIC BLOOD PRESSURE: 128 MMHG | HEIGHT: 70 IN | WEIGHT: 197.4 LBS

## 2024-08-09 DIAGNOSIS — M51.16 LUMBAR DISC HERNIATION WITH RADICULOPATHY: Primary | ICD-10-CM

## 2024-08-09 DIAGNOSIS — M54.16 LUMBAR RADICULOPATHY: ICD-10-CM

## 2024-08-09 NOTE — PROGRESS NOTES
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  He was found to have a large disc protrusion at L4-5 on the left.  On 2024 he underwent discectomy at this level.  Several migrated fragments were removed.  Postoperatively he did well for the first 3 weeks.  As soon as he was released to sit more, he went to [employer] and sat for an entire hour.  Since that time, his leg has been bothering him again.  It is not as severe as it was prior to surgery.  Symptoms are intermittent in nature.  Pain is worse when walking or when sitting for too long.  If he stretches his leg out he can get relief.  He is currently taking gabapentin twice daily.  He started prednisone yesterday and today he is already feeling better.    Past Medical History:   Diagnosis Date    Allergic rhinitis 2016    Intermittent    Atrial fibrillation     Chronic anxiety 2016    Chronic intermittent anxiety/remote depression.     Chronic insomnia 2016    Colostomy in place     Crohn's disease 2016    Crohn’s disease with partial colectomy and colostomy - data deficit.     DVT (deep venous thrombosis)     Remote left calf DVT/Coumadin therapy - data deficit.    Dyslipidemia 2016    Moderate    Enlarged prostate     Glaucoma     Hiatal hernia 2016    probable GERD, 2009.    Hyperlipidemia     Hypertensive cardiovascular disease 2016    Probable hypertensive cardiovascular disease: Remote progressive exertional dyspnea/chest pain syndrome with acceptable GXT and borderline abnormal acceptable Holter monitor, 2009. Recurrent progressive NYHA class III symptoms with abnormal EKG and  acceptable chest x-ray with essentially normal right dominant coronary artery and preserved systolic LV dysfunction, LVEF (0.66) with continued m    Labile hypertension 11/03/2016    Myalgia due to statin 01/03/2024    Obstructive sleep apnea on CPAP 11/03/2016    Obstructive sleep apnea with intermittent CPAP therapy (did not tolerate).  --PATIENT STATES THE DOES NOT USE MACHINE ALL THE TIME    Vitamin deficiency     Chronic vitamin deficiency secondary to Crohn’s disease.           Current Outpatient Medications:     amLODIPine (NORVASC) 5 MG tablet, Take 1 tablet by mouth Daily., Disp: 90 tablet, Rfl: 2    aspirin 81 MG EC tablet, Take 1 tablet by mouth Daily., Disp: 90 tablet, Rfl: 3    Budesonide (ENTOCORT EC) 3 MG 24 hr capsule, Take 2 capsules by mouth Take As Directed. Every 4th day, Disp: , Rfl:     cyclobenzaprine (FLEXERIL) 10 MG tablet, Take 1 tablet by mouth 2 (Two) Times a Day As Needed for Muscle Spasms (take between doses of tramadol, not at the same time)., Disp: 30 tablet, Rfl: 0    Evolocumab with Infusor (REPATHA) solution cartridge, Inject 3.5 mL under the skin into the appropriate area as directed Every 30 (Thirty) Days., Disp: 3.5 mL, Rfl: 6    finasteride (PROSCAR) 5 MG tablet, Take 1 tablet by mouth Daily., Disp: , Rfl:     gabapentin (NEURONTIN) 300 MG capsule, TAKE 1 CAPSULE BY MOUTH THREE TIMES DAILY, Disp: 90 capsule, Rfl: 0    immune globulin, human, 1 GM/5ML solution, Inject  under the skin into the appropriate area as directed 1 (One) Time Per Week. Infusion, Disp: , Rfl:     latanoprost (XALATAN) 0.005 % ophthalmic solution, Administer 1 drop to both eyes Every Night., Disp: , Rfl:     magnesium oxide (MAGOX) 400 (241.3 MG) MG tablet tablet, Take 500 mg by mouth Daily., Disp: , Rfl:     Multiple Vitamins-Minerals (MULTIVITAMIN ADULT PO), Take 1 tablet by mouth Daily., Disp: , Rfl:     naloxone (NARCAN) 4 MG/0.1ML nasal spray, Call 911. Don't prime. [place] in 1 nostril for overdose.  Repeat in 2-3 minutes in other nostril if no or minimal breathing/responsiveness., Disp: 2 each, Rfl: 0    nebivolol (BYSTOLIC) 5 MG tablet, Take 1 tablet by mouth Daily., Disp: 90 tablet, Rfl: 3    nitroglycerin (NITROSTAT) 0.4 MG SL tablet, 1 under the tongue as needed for angina, may repeat q5mins for up three doses (Patient taking differently: 1 under the tongue as needed for angina, may repeat q5mins for up three doses), Disp: 100 tablet, Rfl: 11    omeprazole (priLOSEC) 20 MG capsule, Take 1 capsule by mouth Daily., Disp: , Rfl:     ondansetron (ZOFRAN) 4 MG tablet, Take 1 tablet by mouth As Needed., Disp: , Rfl:     ondansetron ODT (ZOFRAN-ODT) 4 MG disintegrating tablet, Take 1 tablet by mouth Every 6 (Six) Hours As Needed for Nausea or Vomiting., Disp: 15 tablet, Rfl: 0    sertraline (ZOLOFT) 50 MG tablet, Take 1 tablet by mouth Daily., Disp: , Rfl:     spironolactone (ALDACTONE) 25 MG tablet, Take 0.5 (one-half) tablet by mouth Daily., Disp: 45 tablet, Rfl: 3    tamsulosin (FLOMAX) 0.4 MG capsule 24 hr capsule, Take 1 capsule by mouth Daily., Disp: , Rfl:     vitamin C (ASCORBIC ACID) 500 MG tablet, Take 1 tablet by mouth Daily., Disp: , Rfl:     zolpidem (AMBIEN) 10 MG tablet, Take 1 tablet by mouth At Night As Needed for Sleep., Disp: , Rfl:   No current facility-administered medications for this visit.    Facility-Administered Medications Ordered in Other Visits:     mupirocin (BACTROBAN) 2 % nasal ointment, , Nasal, BID, Olga Edward PA-C    Past Surgical History:   Procedure Laterality Date    ATRIAL APPENDAGE EXCLUSION LEFT WITH TRANSESOPHAGEAL ECHOCARDIOGRAM N/A 11/13/2023    Procedure: Atrial Appendage Occlusion;  Surgeon: Tevin Esparza MD;  Location: [facility] INVASIVE LOCATION;  Service: Cardiology;  Laterality: N/A;    CARDIOVERSION      1/16/2023 PER DR. VICKERS    COLECTOMY PARTIAL / TOTAL  2001    with colostomy    COLONOSCOPY      HAND SURGERY  2004    secondary to trauma    LUMBAR  DISCECTOMY Left 2024    Procedure: LUMBAR DISCECTOMY L 4-5;  Surgeon: Dave Dillard MD;  [facility];  Service: Neurosurgery;  Laterality: Left;       Social History     Socioeconomic History    Marital status:    Tobacco Use    Smoking status: Former     Current packs/day: 0.00     Types: Cigarettes     Quit date:      Years since quittin.6     Passive exposure: Never    Smokeless tobacco: Never   Vaping Use    Vaping status: Never Used   Substance and Sexual Activity    Alcohol use: No    Drug use: No    Sexual activity: Defer         Review of Systems   Constitutional:  Negative for activity change, appetite change, chills, diaphoresis, fatigue, fever and unexpected weight change.   HENT:  Negative for congestion, dental problem, drooling, ear discharge, ear pain, facial swelling, hearing loss, mouth sores, nosebleeds, postnasal drip, rhinorrhea, sinus pressure, sinus pain, sneezing, sore throat, tinnitus, trouble swallowing and voice change.    Eyes:  Negative for photophobia, pain, discharge, redness, itching and visual disturbance.   Respiratory:  Negative for apnea, cough, choking, chest tightness, shortness of breath, wheezing and stridor.    Cardiovascular:  Negative for chest pain, palpitations and leg swelling.   Gastrointestinal:  Negative for abdominal distention, abdominal pain, anal bleeding, blood in stool, constipation, diarrhea, nausea, rectal pain and vomiting.   Endocrine: Negative for cold intolerance, heat intolerance, polydipsia, polyphagia and polyuria.   Genitourinary:  Negative for decreased urine volume, difficulty urinating, dysuria, enuresis, flank pain, frequency, genital sores, hematuria, penile discharge, penile pain, penile swelling, scrotal swelling, testicular pain and urgency.   Musculoskeletal:  Positive for back pain. Negative for arthralgias, gait problem, joint swelling, myalgias, neck pain and neck stiffness.   Skin:  Negative for color  "change, pallor, rash and wound.   Allergic/Immunologic: Negative for environmental allergies, food allergies and immunocompromised state.   Neurological:  Negative for dizziness, tremors, seizures, syncope, facial asymmetry, speech difficulty, weakness, light-headedness, numbness and headaches.   Hematological:  Negative for adenopathy. Does not bruise/bleed easily.   Psychiatric/Behavioral:  Negative for agitation, behavioral problems, confusion, decreased concentration, dysphoric mood, hallucinations, self-injury, sleep disturbance and suicidal ideas. The patient is not nervous/anxious and is not hyperactive.        Objective   Vital Signs: Blood pressure 128/72, temperature 97.1 °F (36.2 °C), temperature source Infrared, height 177.8 cm (70\"), weight 89.5 kg (197 lb 6.4 oz).  Physical Exam  Vitals and nursing note reviewed.   Constitutional:       General: He is not in acute distress.     Appearance: He is well-developed.   HENT:      Head: Normocephalic and atraumatic.   Skin:     Comments: Well-healed lumbar incision   Psychiatric:         Behavior: Behavior normal.         Thought Content: Thought content normal.       Assessment & Plan   Diagnosis: Lumbar disc herniation status post discectomy L4-5, left    Medical Decision Making: Patient was doing well for the first 3 weeks and then he has had some recurrence of his leg pain.  Symptoms are not as severe as before surgery and are intermittent in nature.  He was prescribed prednisone yesterday.  I would like him to complete that course and give us an update.  I have also advised him to go back up to taking his gabapentin 3 times daily.  He will follow-up with me in 1 month.        Diagnoses and all orders for this visit:    1. Lumbar disc herniation with radiculopathy (Primary)    2. Lumbar radiculopathy              Jaki Fajardo PA-C  Patient Care Team:  Anand De Souza MD as PCP - General  Tevin Esparza MD as Consulting Physician (Cardiac " Electrophysiology)  Corine Newsome APRN as Nurse Practitioner (Cardiology)  Connor Kelley PA as Physician Assistant (Cardiology)  Anand De Souza MD as Primary Care Provider (Family Medicine)  Dave Dillard MD as Surgeon (Neurosurgery)  Radha Jiménez APRN as Referring Physician (Family Medicine)

## 2024-08-19 ENCOUNTER — TELEPHONE (OUTPATIENT)
Dept: NEUROSURGERY | Facility: CLINIC | Age: 79
End: 2024-08-19
Payer: MEDICARE

## 2024-08-19 DIAGNOSIS — M54.16 LUMBAR RADICULOPATHY: Primary | ICD-10-CM

## 2024-08-19 RX ORDER — TRAMADOL HYDROCHLORIDE 50 MG/1
50 TABLET ORAL EVERY 6 HOURS PRN
Qty: 20 TABLET | Refills: 0 | Status: SHIPPED | OUTPATIENT
Start: 2024-08-19

## 2024-08-19 NOTE — TELEPHONE ENCOUNTER
Provider:  Jyoti  Surgery/Procedure:  Lumbar Discectomy L4-5 left  Surgery/Procedure Date:  6/21/24  Last visit:   8/9/24  Next visit: 9/11/24     Reason for call:  Spoke to Jayla, patient's spouse. She reports Mr. Gamboa has had worsening left hip pain radiating into his left leg and ankle, with new onset of numbness in his left foot. He is also experiencing some muscle spasms in the left leg. He does not have any weakness, but is having difficulty ambulating due to pain. He reports his pain feels similar to what he had experienced prior to surgery, but not to the same extent. He did well the first 3 weeks after surgery, no pain, however since then he has had the return of symptoms. He does report sitting with his legs elevated and stretched does help relieve the pain. He was given prednisone by his PCP, he reports his pain was much more tolerable while taking the steroid, however did return after the steroid was completed. He is currently taking gabapentin 300mg TID, but does not believe it to be giving any relief.

## 2024-08-19 NOTE — TELEPHONE ENCOUNTER
I have asked patient to increase Neurontin and called in 50 mg Tramadol. #20, NR    Mary Ann Hazel PA-C

## 2024-09-06 DIAGNOSIS — M51.16 LUMBAR DISC HERNIATION WITH RADICULOPATHY: ICD-10-CM

## 2024-09-06 DIAGNOSIS — M54.16 LUMBAR RADICULOPATHY: ICD-10-CM

## 2024-09-09 RX ORDER — GABAPENTIN 300 MG/1
300 CAPSULE ORAL 3 TIMES DAILY
Qty: 90 CAPSULE | Refills: 0 | Status: SHIPPED | OUTPATIENT
Start: 2024-09-09

## 2024-09-09 NOTE — TELEPHONE ENCOUNTER
Provider:  Nishant  Surgery/Procedure:  Lumbar Discectomy L4-5  Surgery/Procedure Date:  6/21/24  Last visit:   8/9/24  Next visit: 9/11/24     Reason for call:  Refill request for gabapentin pending.    Sj:    1 08/19/2024 [id number] Tramadol Hydrochloride 50MG [place], [facility] 20.00 5 01 [place]  New 08/19/2024 [facility]  1 07/25/2024 [id number] Gabapentin 300MG [facility], [facility] 90.00 30 01 [place]  New 07/25/2024 [facility]  2 06/21/2024 [id number] Oxycodone/Acetaminophen   [facility]  12.00 4 23 01 [place]  New 06/21/2024 [facility]  1 06/17/2024 [id number] Gabapentin 300MG [facility] 90.00 30 01 [place]  New 06/17/2024 [facility]  1 06/11/2024 [id number] Tramadol Hydrochloride 50MG [facility], [facility] 60.00 15 01 [place]  New 06/11/2024 [facility]

## 2024-09-11 ENCOUNTER — OFFICE VISIT (OUTPATIENT)
Dept: NEUROSURGERY | Facility: CLINIC | Age: 79
End: 2024-09-11
Payer: MEDICARE

## 2024-09-11 ENCOUNTER — HOSPITAL ENCOUNTER (OUTPATIENT)
Dept: MRI IMAGING | Facility: HOSPITAL | Age: 79
Discharge: HOME | End: 2024-09-11
Admitting: PHYSICIAN ASSISTANT
Payer: MEDICARE

## 2024-09-11 VITALS
TEMPERATURE: 96.8 F | HEIGHT: 70 IN | BODY MASS INDEX: 27.23 KG/M2 | WEIGHT: 190.2 LBS | SYSTOLIC BLOOD PRESSURE: 130 MMHG | DIASTOLIC BLOOD PRESSURE: 80 MMHG

## 2024-09-11 DIAGNOSIS — M54.16 LUMBAR RADICULOPATHY: ICD-10-CM

## 2024-09-11 DIAGNOSIS — M51.16 LUMBAR DISC HERNIATION WITH RADICULOPATHY: Primary | ICD-10-CM

## 2024-09-11 PROCEDURE — 0 GADOBENATE DIMEGLUMINE 529 MG/ML SOLUTION: Performed by: PHYSICIAN ASSISTANT

## 2024-09-11 PROCEDURE — A9577 INJ MULTIHANCE: HCPCS | Performed by: PHYSICIAN ASSISTANT

## 2024-09-11 PROCEDURE — 3075F SYST BP GE 130 - 139MM HG: CPT | Performed by: PHYSICIAN ASSISTANT

## 2024-09-11 PROCEDURE — 3079F DIAST BP 80-89 MM HG: CPT | Performed by: PHYSICIAN ASSISTANT

## 2024-09-11 PROCEDURE — 99024 POSTOP FOLLOW-UP VISIT: CPT | Performed by: PHYSICIAN ASSISTANT

## 2024-09-11 PROCEDURE — 72158 MRI LUMBAR SPINE W/O & W/DYE: CPT

## 2024-09-11 RX ORDER — FAMOTIDINE 10 MG
20 TABLET ORAL
OUTPATIENT
Start: 2024-09-11

## 2024-09-11 RX ORDER — CHLORHEXIDINE GLUCONATE 40 MG/ML
SOLUTION TOPICAL
Qty: 120 ML | Refills: 0 | Status: SHIPPED | OUTPATIENT
Start: 2024-09-11

## 2024-09-11 RX ADMIN — GADOBENATE DIMEGLUMINE 18 ML: 529 INJECTION, SOLUTION INTRAVENOUS at 10:41

## 2024-09-11 NOTE — PROGRESS NOTES
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  He was found to have a large disc protrusion at L4-5 on the left.  On 2024 he underwent discectomy at this level.  Several migrated fragments were removed.  Postoperatively he did well for the first 3 weeks.  As soon as he was released to sit more, he went to [employer] and sat for an entire hour.  Since that time, his leg has been bothering him again.  It is not as severe as it was prior to surgery.  Pain is worse when walking or when sitting for too long.  If he stretches his leg out he can get relief.  While on prednisone he feels better. He is taking gabapentin TID and tramadol.     Past Medical History:   Diagnosis Date    Allergic rhinitis 2016    Intermittent    Atrial fibrillation     Chronic anxiety 2016    Chronic intermittent anxiety/remote depression.     Chronic insomnia 2016    Colostomy in place     Crohn's disease 2016    Crohn’s disease with partial colectomy and colostomy - data deficit.     DVT (deep venous thrombosis)     Remote left calf DVT/Coumadin therapy - data deficit.    Dyslipidemia 2016    Moderate    Enlarged prostate     Glaucoma     Hiatal hernia 2016    probable GERD, 2009.    Hyperlipidemia     Hypertensive cardiovascular disease 2016    Probable hypertensive cardiovascular disease: Remote progressive exertional dyspnea/chest pain syndrome with acceptable GXT and borderline abnormal acceptable Holter monitor, 2009. Recurrent progressive NYHA class III symptoms with abnormal EKG and acceptable chest x-ray with essentially normal right dominant coronary artery and  preserved systolic LV dysfunction, LVEF (0.66) with continued m    Labile hypertension 11/03/2016    Myalgia due to statin 01/03/2024    Obstructive sleep apnea on CPAP 11/03/2016    Obstructive sleep apnea with intermittent CPAP therapy (did not tolerate).  --PATIENT STATES THE DOES NOT USE MACHINE ALL THE TIME    Vitamin deficiency     Chronic vitamin deficiency secondary to Crohn’s disease.           Current Outpatient Medications:     amLODIPine (NORVASC) 5 MG tablet, Take 1 tablet by mouth Daily., Disp: 90 tablet, Rfl: 2    aspirin 81 MG EC tablet, Take 1 tablet by mouth Daily., Disp: 90 tablet, Rfl: 3    Budesonide (ENTOCORT EC) 3 MG 24 hr capsule, Take 2 capsules by mouth Take As Directed. Every 4th day, Disp: , Rfl:     cyclobenzaprine (FLEXERIL) 10 MG tablet, Take 1 tablet by mouth 2 (Two) Times a Day As Needed for Muscle Spasms (take between doses of tramadol, not at the same time)., Disp: 30 tablet, Rfl: 0    Evolocumab with Infusor (REPATHA) solution cartridge, Inject 3.5 mL under the skin into the appropriate area as directed Every 30 (Thirty) Days., Disp: 3.5 mL, Rfl: 6    finasteride (PROSCAR) 5 MG tablet, Take 1 tablet by mouth Daily., Disp: , Rfl:     gabapentin (NEURONTIN) 300 MG capsule, TAKE 1 CAPSULE BY MOUTH THREE TIMES DAILY, Disp: 90 capsule, Rfl: 0    immune globulin, human, 1 GM/5ML solution, Inject  under the skin into the appropriate area as directed 1 (One) Time Per Week. Infusion, Disp: , Rfl:     latanoprost (XALATAN) 0.005 % ophthalmic solution, Administer 1 drop to both eyes Every Night., Disp: , Rfl:     magnesium oxide (MAGOX) 400 (241.3 MG) MG tablet tablet, Take 500 mg by mouth Daily., Disp: , Rfl:     Multiple Vitamins-Minerals (MULTIVITAMIN ADULT PO), Take 1 tablet by mouth Daily., Disp: , Rfl:     naloxone (NARCAN) 4 MG/0.1ML nasal spray, Call 911. Don't prime. [place] in 1 nostril for overdose. Repeat in 2-3 minutes in other nostril if no or minimal breathing/responsiveness.,  Disp: 2 each, Rfl: 0    nebivolol (BYSTOLIC) 5 MG tablet, Take 1 tablet by mouth Daily., Disp: 90 tablet, Rfl: 3    nitroglycerin (NITROSTAT) 0.4 MG SL tablet, 1 under the tongue as needed for angina, may repeat q5mins for up three doses (Patient taking differently: 1 under the tongue as needed for angina, may repeat q5mins for up three doses), Disp: 100 tablet, Rfl: 11    omeprazole (priLOSEC) 20 MG capsule, Take 1 capsule by mouth Daily., Disp: , Rfl:     ondansetron (ZOFRAN) 4 MG tablet, Take 1 tablet by mouth As Needed., Disp: , Rfl:     ondansetron ODT (ZOFRAN-ODT) 4 MG disintegrating tablet, Take 1 tablet by mouth Every 6 (Six) Hours As Needed for Nausea or Vomiting., Disp: 15 tablet, Rfl: 0    sertraline (ZOLOFT) 50 MG tablet, Take 1 tablet by mouth Daily., Disp: , Rfl:     spironolactone (ALDACTONE) 25 MG tablet, Take 0.5 (one-half) tablet by mouth Daily., Disp: 45 tablet, Rfl: 3    tamsulosin (FLOMAX) 0.4 MG capsule 24 hr capsule, Take 1 capsule by mouth Daily., Disp: , Rfl:     traMADol (ULTRAM) 50 MG tablet, Take 1 tablet by mouth Every 6 (Six) Hours As Needed for Severe Pain., Disp: 20 tablet, Rfl: 0    vitamin C (ASCORBIC ACID) 500 MG tablet, Take 1 tablet by mouth Daily., Disp: , Rfl:     zolpidem (AMBIEN) 10 MG tablet, Take 1 tablet by mouth At Night As Needed for Sleep., Disp: , Rfl:     Chlorhexidine Gluconate 4 % solution, Shower each day with solution for 5 days beginning 5 days before surgery., Disp: 120 mL, Rfl: 0    mupirocin (BACTROBAN) 2 % nasal ointment, Apply to the inside of each nostril with a cotton swab two times daily, morning and evening, for 5 days before surgery., Disp: 10 each, Rfl: 0  No current facility-administered medications for this visit.    Past Surgical History:   Procedure Laterality Date    ATRIAL APPENDAGE EXCLUSION LEFT WITH TRANSESOPHAGEAL ECHOCARDIOGRAM N/A 11/13/2023    Procedure: Atrial Appendage Occlusion;  Surgeon: Tevin Esparza MD;  Location: [facility]  INVASIVE LOCATION;  Service: Cardiology;  Laterality: N/A;    CARDIOVERSION      2023 PER DR. VICKERS    COLECTOMY PARTIAL / TOTAL      with colostomy    COLONOSCOPY      HAND SURGERY      secondary to trauma    LUMBAR DISCECTOMY Left 2024    Procedure: LUMBAR DISCECTOMY L 4-5;  Surgeon: Dave Dillard MD;  Location: [facility];  Service: Neurosurgery;  Laterality: Left;       Social History     Socioeconomic History    Marital status:    Tobacco Use    Smoking status: Former     Current packs/day: 0.00     Types: Cigarettes     Quit date:      Years since quittin.7     Passive exposure: Never    Smokeless tobacco: Never   Vaping Use    Vaping status: Never Used   Substance and Sexual Activity    Alcohol use: No    Drug use: No    Sexual activity: Defer         Review of Systems   Constitutional:  Negative for activity change, appetite change, chills, diaphoresis, fatigue, fever and unexpected weight change.   HENT:  Negative for congestion, dental problem, drooling, ear discharge, ear pain, facial swelling, hearing loss, mouth sores, nosebleeds, postnasal drip, rhinorrhea, sinus pressure, sinus pain, sneezing, sore throat, tinnitus, trouble swallowing and voice change.    Eyes:  Negative for photophobia, pain, discharge, redness, itching and visual disturbance.   Respiratory:  Negative for apnea, cough, choking, chest tightness, shortness of breath, wheezing and stridor.    Cardiovascular:  Negative for chest pain, palpitations and leg swelling.   Gastrointestinal:  Negative for abdominal distention, abdominal pain, anal bleeding, blood in stool, constipation, diarrhea, nausea, rectal pain and vomiting.   Endocrine: Negative for cold intolerance, heat intolerance, polydipsia, polyphagia and polyuria.   Genitourinary:  Negative for decreased urine volume, difficulty urinating, dysuria, enuresis, flank pain, frequency, genital sores, hematuria, penile discharge, penile pain, penile  "swelling, scrotal swelling, testicular pain and urgency.   Musculoskeletal:  Positive for back pain. Negative for arthralgias, gait problem, joint swelling, myalgias, neck pain and neck stiffness.   Skin:  Negative for color change, pallor, rash and wound.   Allergic/Immunologic: Negative for environmental allergies, food allergies and immunocompromised state.   Neurological:  Negative for dizziness, tremors, seizures, syncope, facial asymmetry, speech difficulty, weakness, light-headedness, numbness and headaches.   Hematological:  Negative for adenopathy. Does not bruise/bleed easily.   Psychiatric/Behavioral:  Negative for agitation, behavioral problems, confusion, decreased concentration, dysphoric mood, hallucinations, self-injury, sleep disturbance and suicidal ideas. The patient is not nervous/anxious and is not hyperactive.        Objective   Vital Signs: Blood pressure 130/80, temperature 96.8 °F (36 °C), temperature source Infrared, height 177.8 cm (70\"), weight 86.3 kg (190 lb 3.2 oz).  Physical Exam  Vitals and nursing note reviewed.   Constitutional:       General: He is not in acute distress.     Appearance: He is well-developed.   HENT:      Head: Normocephalic and atraumatic.   Skin:     Comments: Well-healed lumbar incision   Psychiatric:         Behavior: Behavior normal.         Thought Content: Thought content normal.       MRI dated 9/11/2024 demonstrates postoperative changes at L4-5 from previous left hemilaminectomy and discectomy.  There is enhancing scar tissue seen along the operative tract.  There may be a small disc fragment along the left recess.  This could be thickened ligament.    Assessment & Plan   Diagnosis: Lumbar disc herniation status post discectomy L4-5, left    Medical Decision Making: Patient did well with complete resolution of his leg symptoms for 3 weeks following surgery.  Then after sitting for about an hour, leg pain recurred and has been constant and quite limiting " despite medications.  He may have a recurrent disc fragment that could be source for his symptoms.  I have discussed treatment options including giving this some time, epidural injection, and surgery.  He would like to pursue surgical exploration.  Imaging and exam has been reviewed with Dr. Dillard and he is in agreement with proceeding with exploration at L4-5 on the left and possible redo discectomy.  I discussed the general nature of the procedure as well as risk, limitations, and possible complications and patient is in agreement to proceed.      Diagnoses and all orders for this visit:    1. Lumbar disc herniation with radiculopathy (Primary)            Jaki Fajardo PA-C  Patient Care Team:  Anand De Souza MD as PCP - General  Tevin Esparza MD as Consulting Physician (Cardiac Electrophysiology)  Corine Newsome APRN as Nurse Practitioner (Cardiology)  Connor Kelley PA as Physician Assistant (Cardiology)  Anand De Souza MD as Primary Care Provider (Family Medicine)  Dave Dillard MD as Surgeon (Neurosurgery)  Radha Jiménez APRN as Referring Physician (Family Medicine)

## 2024-09-16 ENCOUNTER — ANESTHESIA EVENT (OUTPATIENT)
Dept: PERIOP | Facility: HOSPITAL | Age: 79
End: 2024-09-16
Payer: MEDICARE

## 2024-09-16 ENCOUNTER — PRE-ADMISSION TESTING (OUTPATIENT)
Dept: PREADMISSION TESTING | Facility: HOSPITAL | Age: 79
End: 2024-09-16
Payer: MEDICARE

## 2024-09-16 VITALS — HEIGHT: 70 IN | BODY MASS INDEX: 27.25 KG/M2 | WEIGHT: 190.37 LBS

## 2024-09-16 DIAGNOSIS — M51.16 LUMBAR DISC HERNIATION WITH RADICULOPATHY: ICD-10-CM

## 2024-09-16 LAB
ALBUMIN SERPL-MCNC: 3.9 G/DL (ref 3.5–5.2)
ALBUMIN/GLOB SERPL: 2.2 G/DL
ALP SERPL-CCNC: 94 U/L (ref 39–117)
ALT SERPL W P-5'-P-CCNC: 24 U/L (ref 1–41)
ANION GAP SERPL CALCULATED.3IONS-SCNC: 10 MMOL/L (ref 5–15)
AST SERPL-CCNC: 26 U/L (ref 1–40)
BASOPHILS # BLD AUTO: 0.04 10*3/MM3 (ref 0–0.2)
BASOPHILS NFR BLD AUTO: 0.5 % (ref 0–1.5)
BILIRUB SERPL-MCNC: 0.6 MG/DL (ref 0–1.2)
BUN SERPL-MCNC: 28 MG/DL (ref 8–23)
BUN/CREAT SERPL: 22.4 (ref 7–25)
CALCIUM SPEC-SCNC: 9.2 MG/DL (ref 8.6–10.5)
CHLORIDE SERPL-SCNC: 101 MMOL/L (ref 98–107)
CO2 SERPL-SCNC: 31 MMOL/L (ref 22–29)
CREAT SERPL-MCNC: 1.25 MG/DL (ref 0.76–1.27)
DEPRECATED RDW RBC AUTO: 45.2 FL (ref 37–54)
EGFRCR SERPLBLD CKD-EPI 2021: 58.9 ML/MIN/1.73
EOSINOPHIL # BLD AUTO: 0.09 10*3/MM3 (ref 0–0.4)
EOSINOPHIL NFR BLD AUTO: 1.2 % (ref 0.3–6.2)
ERYTHROCYTE [DISTWIDTH] IN BLOOD BY AUTOMATED COUNT: 12.7 % (ref 12.3–15.4)
GLOBULIN UR ELPH-MCNC: 1.8 GM/DL
GLUCOSE SERPL-MCNC: 63 MG/DL (ref 65–99)
HCT VFR BLD AUTO: 43.5 % (ref 37.5–51)
HGB BLD-MCNC: 14.8 G/DL (ref 13–17.7)
IMM GRANULOCYTES # BLD AUTO: 0.05 10*3/MM3 (ref 0–0.05)
IMM GRANULOCYTES NFR BLD AUTO: 0.7 % (ref 0–0.5)
LYMPHOCYTES # BLD AUTO: 0.96 10*3/MM3 (ref 0.7–3.1)
LYMPHOCYTES NFR BLD AUTO: 12.6 % (ref 19.6–45.3)
MCH RBC QN AUTO: 33.3 PG (ref 26.6–33)
MCHC RBC AUTO-ENTMCNC: 34 G/DL (ref 31.5–35.7)
MCV RBC AUTO: 97.8 FL (ref 79–97)
MONOCYTES # BLD AUTO: 0.81 10*3/MM3 (ref 0.1–0.9)
MONOCYTES NFR BLD AUTO: 10.6 % (ref 5–12)
NEUTROPHILS NFR BLD AUTO: 5.68 10*3/MM3 (ref 1.7–7)
NEUTROPHILS NFR BLD AUTO: 74.4 % (ref 42.7–76)
NRBC BLD AUTO-RTO: 0 /100 WBC (ref 0–0.2)
PLATELET # BLD AUTO: 168 10*3/MM3 (ref 140–450)
PMV BLD AUTO: 9.4 FL (ref 6–12)
POTASSIUM SERPL-SCNC: 4.3 MMOL/L (ref 3.5–5.2)
PROT SERPL-MCNC: 5.7 G/DL (ref 6–8.5)
RBC # BLD AUTO: 4.45 10*6/MM3 (ref 4.14–5.8)
SODIUM SERPL-SCNC: 142 MMOL/L (ref 136–145)
WBC NRBC COR # BLD AUTO: 7.63 10*3/MM3 (ref 3.4–10.8)

## 2024-09-16 PROCEDURE — 85025 COMPLETE CBC W/AUTO DIFF WBC: CPT

## 2024-09-16 PROCEDURE — 80053 COMPREHEN METABOLIC PANEL: CPT

## 2024-09-16 PROCEDURE — 36415 COLL VENOUS BLD VENIPUNCTURE: CPT

## 2024-09-16 RX ORDER — SERTRALINE HYDROCHLORIDE 100 MG/1
50 TABLET, FILM COATED ORAL DAILY
COMMUNITY
Start: 2024-06-18

## 2024-09-18 RX ORDER — SODIUM CHLORIDE 0.9 % (FLUSH) 0.9 %
10 SYRINGE (ML) INJECTION AS NEEDED
Status: CANCELLED | OUTPATIENT
Start: 2024-09-18

## 2024-09-18 RX ORDER — SODIUM CHLORIDE 9 MG/ML
40 INJECTION, SOLUTION INTRAVENOUS AS NEEDED
Status: CANCELLED | OUTPATIENT
Start: 2024-09-18

## 2024-09-18 RX ORDER — FAMOTIDINE 10 MG/ML
20 INJECTION, SOLUTION INTRAVENOUS ONCE
Status: CANCELLED | OUTPATIENT
Start: 2024-09-18 | End: 2024-09-18

## 2024-09-19 ENCOUNTER — HOSPITAL ENCOUNTER (OUTPATIENT)
Facility: HOSPITAL | Age: 79
Setting detail: HOSPITAL OUTPATIENT SURGERY
Discharge: HOME | End: 2024-09-19
Attending: NEUROLOGICAL SURGERY | Admitting: NEUROLOGICAL SURGERY
Payer: MEDICARE

## 2024-09-19 ENCOUNTER — APPOINTMENT (OUTPATIENT)
Dept: GENERAL RADIOLOGY | Facility: HOSPITAL | Age: 79
End: 2024-09-19
Payer: MEDICARE

## 2024-09-19 ENCOUNTER — ANESTHESIA (OUTPATIENT)
Dept: PERIOP | Facility: HOSPITAL | Age: 79
End: 2024-09-19
Payer: MEDICARE

## 2024-09-19 VITALS
SYSTOLIC BLOOD PRESSURE: 135 MMHG | DIASTOLIC BLOOD PRESSURE: 88 MMHG | TEMPERATURE: 97.3 F | HEART RATE: 92 BPM | OXYGEN SATURATION: 96 % | RESPIRATION RATE: 12 BRPM

## 2024-09-19 DIAGNOSIS — M51.16 LUMBAR DISC HERNIATION WITH RADICULOPATHY: ICD-10-CM

## 2024-09-19 PROCEDURE — 87075 CULTR BACTERIA EXCEPT BLOOD: CPT | Performed by: NEUROLOGICAL SURGERY

## 2024-09-19 PROCEDURE — 25010000002 SUGAMMADEX 200 MG/2ML SOLUTION: Performed by: NURSE ANESTHETIST, CERTIFIED REGISTERED

## 2024-09-19 PROCEDURE — 63030 LAMOT DCMPRN NRV RT 1 LMBR: CPT

## 2024-09-19 PROCEDURE — 25010000002 CEFAZOLIN PER 500 MG: Performed by: PHYSICIAN ASSISTANT

## 2024-09-19 PROCEDURE — 63030 LAMOT DCMPRN NRV RT 1 LMBR: CPT | Performed by: NEUROLOGICAL SURGERY

## 2024-09-19 PROCEDURE — 25010000002 FENTANYL CITRATE (PF) 50 MCG/ML SOLUTION

## 2024-09-19 PROCEDURE — 76000 FLUOROSCOPY <1 HR PHYS/QHP: CPT

## 2024-09-19 PROCEDURE — 25810000003 LACTATED RINGERS PER 1000 ML: Performed by: ANESTHESIOLOGY

## 2024-09-19 PROCEDURE — C1713 ANCHOR/SCREW BN/BN,TIS/BN: HCPCS | Performed by: NEUROLOGICAL SURGERY

## 2024-09-19 PROCEDURE — 87070 CULTURE OTHR SPECIMN AEROBIC: CPT | Performed by: NEUROLOGICAL SURGERY

## 2024-09-19 PROCEDURE — 25010000002 HYDROMORPHONE 1 MG/ML SOLUTION

## 2024-09-19 PROCEDURE — 25010000002 DEXAMETHASONE PER 1 MG: Performed by: NURSE ANESTHETIST, CERTIFIED REGISTERED

## 2024-09-19 PROCEDURE — 87116 MYCOBACTERIA CULTURE: CPT | Performed by: NEUROLOGICAL SURGERY

## 2024-09-19 PROCEDURE — 87205 SMEAR GRAM STAIN: CPT | Performed by: NEUROLOGICAL SURGERY

## 2024-09-19 PROCEDURE — 87206 SMEAR FLUORESCENT/ACID STAI: CPT | Performed by: NEUROLOGICAL SURGERY

## 2024-09-19 PROCEDURE — 25010000002 METHYLPREDNISOLONE PER 40 MG: Performed by: NEUROLOGICAL SURGERY

## 2024-09-19 PROCEDURE — 25010000002 ONDANSETRON PER 1 MG: Performed by: NURSE ANESTHETIST, CERTIFIED REGISTERED

## 2024-09-19 PROCEDURE — 25010000002 FENTANYL CITRATE (PF) 100 MCG/2ML SOLUTION: Performed by: NURSE ANESTHETIST, CERTIFIED REGISTERED

## 2024-09-19 PROCEDURE — 25010000002 PROPOFOL 10 MG/ML EMULSION: Performed by: NURSE ANESTHETIST, CERTIFIED REGISTERED

## 2024-09-19 DEVICE — IMPLANTABLE DEVICE
Type: IMPLANTABLE DEVICE | Site: SPINE LUMBAR | Status: FUNCTIONAL
Brand: SURGIFOAM® ABSORBABLE GELATIN SPONGE, U.S.P.

## 2024-09-19 DEVICE — SSC BONE WAX
Type: IMPLANTABLE DEVICE | Site: SPINE LUMBAR | Status: FUNCTIONAL
Brand: SSC BONE WAX

## 2024-09-19 RX ORDER — DEXAMETHASONE SODIUM PHOSPHATE 4 MG/ML
INJECTION, SOLUTION INTRA-ARTICULAR; INTRALESIONAL; INTRAMUSCULAR; INTRAVENOUS; SOFT TISSUE AS NEEDED
Status: DISCONTINUED | OUTPATIENT
Start: 2024-09-19 | End: 2024-09-19 | Stop reason: SURG

## 2024-09-19 RX ORDER — METHYLPREDNISOLONE ACETATE 40 MG/ML
INJECTION, SUSPENSION INTRA-ARTICULAR; INTRALESIONAL; INTRAMUSCULAR; SOFT TISSUE AS NEEDED
Status: DISCONTINUED | OUTPATIENT
Start: 2024-09-19 | End: 2024-09-19 | Stop reason: HOSPADM

## 2024-09-19 RX ORDER — SODIUM CHLORIDE 0.9 % (FLUSH) 0.9 %
10 SYRINGE (ML) INJECTION EVERY 12 HOURS SCHEDULED
Status: DISCONTINUED | OUTPATIENT
Start: 2024-09-19 | End: 2024-09-19 | Stop reason: HOSPADM

## 2024-09-19 RX ORDER — LIDOCAINE HYDROCHLORIDE 10 MG/ML
INJECTION, SOLUTION EPIDURAL; INFILTRATION; INTRACAUDAL; PERINEURAL AS NEEDED
Status: DISCONTINUED | OUTPATIENT
Start: 2024-09-19 | End: 2024-09-19 | Stop reason: SURG

## 2024-09-19 RX ORDER — OXYCODONE AND ACETAMINOPHEN 5; 325 MG/1; MG/1
1 TABLET ORAL 3 TIMES DAILY PRN
Qty: 15 TABLET | Refills: 0 | Status: SHIPPED | OUTPATIENT
Start: 2024-09-19

## 2024-09-19 RX ORDER — HYDROMORPHONE HYDROCHLORIDE 1 MG/ML
0.5 INJECTION, SOLUTION INTRAMUSCULAR; INTRAVENOUS; SUBCUTANEOUS
Status: DISCONTINUED | OUTPATIENT
Start: 2024-09-19 | End: 2024-09-19 | Stop reason: HOSPADM

## 2024-09-19 RX ORDER — MIDAZOLAM HYDROCHLORIDE 1 MG/ML
0.5 INJECTION INTRAMUSCULAR; INTRAVENOUS
Status: DISCONTINUED | OUTPATIENT
Start: 2024-09-19 | End: 2024-09-19 | Stop reason: HOSPADM

## 2024-09-19 RX ORDER — PROPOFOL 10 MG/ML
VIAL (ML) INTRAVENOUS AS NEEDED
Status: DISCONTINUED | OUTPATIENT
Start: 2024-09-19 | End: 2024-09-19 | Stop reason: SURG

## 2024-09-19 RX ORDER — DROPERIDOL 2.5 MG/ML
0.62 INJECTION, SOLUTION INTRAMUSCULAR; INTRAVENOUS ONCE AS NEEDED
Status: DISCONTINUED | OUTPATIENT
Start: 2024-09-19 | End: 2024-09-19 | Stop reason: HOSPADM

## 2024-09-19 RX ORDER — FENTANYL CITRATE 50 UG/ML
50 INJECTION, SOLUTION INTRAMUSCULAR; INTRAVENOUS
Status: DISCONTINUED | OUTPATIENT
Start: 2024-09-19 | End: 2024-09-19 | Stop reason: HOSPADM

## 2024-09-19 RX ORDER — FENTANYL CITRATE 50 UG/ML
INJECTION, SOLUTION INTRAMUSCULAR; INTRAVENOUS AS NEEDED
Status: DISCONTINUED | OUTPATIENT
Start: 2024-09-19 | End: 2024-09-19 | Stop reason: SURG

## 2024-09-19 RX ORDER — LIDOCAINE HYDROCHLORIDE 10 MG/ML
0.5 INJECTION, SOLUTION EPIDURAL; INFILTRATION; INTRACAUDAL; PERINEURAL ONCE AS NEEDED
Status: COMPLETED | OUTPATIENT
Start: 2024-09-19 | End: 2024-09-19

## 2024-09-19 RX ORDER — FENTANYL CITRATE 50 UG/ML
INJECTION, SOLUTION INTRAMUSCULAR; INTRAVENOUS
Status: COMPLETED
Start: 2024-09-19 | End: 2024-09-19

## 2024-09-19 RX ORDER — ROCURONIUM BROMIDE 10 MG/ML
INJECTION, SOLUTION INTRAVENOUS AS NEEDED
Status: DISCONTINUED | OUTPATIENT
Start: 2024-09-19 | End: 2024-09-19 | Stop reason: SURG

## 2024-09-19 RX ORDER — ONDANSETRON 2 MG/ML
INJECTION INTRAMUSCULAR; INTRAVENOUS AS NEEDED
Status: DISCONTINUED | OUTPATIENT
Start: 2024-09-19 | End: 2024-09-19 | Stop reason: SURG

## 2024-09-19 RX ORDER — FAMOTIDINE 20 MG/1
20 TABLET, FILM COATED ORAL ONCE
Status: COMPLETED | OUTPATIENT
Start: 2024-09-19 | End: 2024-09-19

## 2024-09-19 RX ORDER — NEBIVOLOL 5 MG/1
5 TABLET ORAL ONCE
Status: COMPLETED | OUTPATIENT
Start: 2024-09-19 | End: 2024-09-19

## 2024-09-19 RX ORDER — BUPIVACAINE HYDROCHLORIDE AND EPINEPHRINE 2.5; 5 MG/ML; UG/ML
INJECTION, SOLUTION EPIDURAL; INFILTRATION; INTRACAUDAL; PERINEURAL AS NEEDED
Status: DISCONTINUED | OUTPATIENT
Start: 2024-09-19 | End: 2024-09-19 | Stop reason: HOSPADM

## 2024-09-19 RX ORDER — SODIUM CHLORIDE, SODIUM LACTATE, POTASSIUM CHLORIDE, CALCIUM CHLORIDE 600; 310; 30; 20 MG/100ML; MG/100ML; MG/100ML; MG/100ML
9 INJECTION, SOLUTION INTRAVENOUS CONTINUOUS
Status: DISCONTINUED | OUTPATIENT
Start: 2024-09-19 | End: 2024-09-19 | Stop reason: HOSPADM

## 2024-09-19 RX ORDER — MAGNESIUM HYDROXIDE 1200 MG/15ML
LIQUID ORAL AS NEEDED
Status: DISCONTINUED | OUTPATIENT
Start: 2024-09-19 | End: 2024-09-19 | Stop reason: HOSPADM

## 2024-09-19 RX ADMIN — FENTANYL CITRATE 50 MCG: 50 INJECTION, SOLUTION INTRAMUSCULAR; INTRAVENOUS at 13:00

## 2024-09-19 RX ADMIN — FENTANYL CITRATE 50 MCG: 50 INJECTION, SOLUTION INTRAMUSCULAR; INTRAVENOUS at 11:24

## 2024-09-19 RX ADMIN — SUGAMMADEX 200 MG: 100 INJECTION, SOLUTION INTRAVENOUS at 12:20

## 2024-09-19 RX ADMIN — FENTANYL CITRATE 50 MCG: 50 INJECTION, SOLUTION INTRAMUSCULAR; INTRAVENOUS at 12:07

## 2024-09-19 RX ADMIN — ONDANSETRON 4 MG: 2 INJECTION INTRAMUSCULAR; INTRAVENOUS at 12:06

## 2024-09-19 RX ADMIN — ROCURONIUM BROMIDE 50 MG: 10 INJECTION INTRAVENOUS at 11:24

## 2024-09-19 RX ADMIN — HYDROMORPHONE HYDROCHLORIDE 0.5 MG: 1 INJECTION, SOLUTION INTRAMUSCULAR; INTRAVENOUS; SUBCUTANEOUS at 13:06

## 2024-09-19 RX ADMIN — SODIUM CHLORIDE, POTASSIUM CHLORIDE, SODIUM LACTATE AND CALCIUM CHLORIDE 9 ML/HR: 600; 310; 30; 20 INJECTION, SOLUTION INTRAVENOUS at 10:57

## 2024-09-19 RX ADMIN — DEXAMETHASONE SODIUM PHOSPHATE 4 MG: 4 INJECTION INTRA-ARTICULAR; INTRALESIONAL; INTRAMUSCULAR; INTRAVENOUS; SOFT TISSUE at 11:30

## 2024-09-19 RX ADMIN — NEBIVOLOL 5 MG: 5 TABLET ORAL at 11:08

## 2024-09-19 RX ADMIN — FAMOTIDINE 20 MG: 20 TABLET, FILM COATED ORAL at 10:56

## 2024-09-19 RX ADMIN — LIDOCAINE HYDROCHLORIDE 50 MG: 10 INJECTION, SOLUTION EPIDURAL; INFILTRATION; INTRACAUDAL; PERINEURAL at 11:24

## 2024-09-19 RX ADMIN — PROPOFOL 100 MG: 10 INJECTION, EMULSION INTRAVENOUS at 11:24

## 2024-09-19 RX ADMIN — LIDOCAINE HYDROCHLORIDE 0.5 ML: 10 INJECTION, SOLUTION EPIDURAL; INFILTRATION; INTRACAUDAL; PERINEURAL at 10:56

## 2024-09-19 RX ADMIN — SODIUM CHLORIDE 2 G: 900 INJECTION INTRAVENOUS at 11:22

## 2024-09-20 LAB — NIGHT BLUE STAIN TISS: NORMAL

## 2024-09-22 LAB
BACTERIA SPEC AEROBE CULT: NORMAL
BACTERIA SPEC ANAEROBE CULT: NORMAL
GRAM STN SPEC: NORMAL

## 2024-09-24 LAB — BACTERIA SPEC ANAEROBE CULT: NORMAL

## 2024-09-26 LAB
MYCOBACTERIUM SPEC CULT: NORMAL
NIGHT BLUE STAIN TISS: NORMAL

## 2024-10-03 LAB
MYCOBACTERIUM SPEC CULT: NORMAL
NIGHT BLUE STAIN TISS: NORMAL

## 2024-10-10 LAB
MYCOBACTERIUM SPEC CULT: NORMAL
NIGHT BLUE STAIN TISS: NORMAL

## 2024-10-15 ENCOUNTER — OFFICE VISIT (OUTPATIENT)
Dept: NEUROSURGERY | Facility: CLINIC | Age: 79
End: 2024-10-15
Payer: MEDICARE

## 2024-10-15 VITALS
DIASTOLIC BLOOD PRESSURE: 78 MMHG | BODY MASS INDEX: 28.03 KG/M2 | WEIGHT: 195.8 LBS | TEMPERATURE: 96.8 F | HEIGHT: 70 IN | SYSTOLIC BLOOD PRESSURE: 130 MMHG

## 2024-10-15 DIAGNOSIS — M51.16 LUMBAR DISC HERNIATION WITH RADICULOPATHY: Primary | ICD-10-CM

## 2024-10-15 PROCEDURE — 1160F RVW MEDS BY RX/DR IN RCRD: CPT

## 2024-10-15 PROCEDURE — 99024 POSTOP FOLLOW-UP VISIT: CPT

## 2024-10-15 PROCEDURE — 1159F MED LIST DOCD IN RCRD: CPT

## 2024-10-15 PROCEDURE — 3078F DIAST BP <80 MM HG: CPT

## 2024-10-15 PROCEDURE — 3075F SYST BP GE 130 - 139MM HG: CPT

## 2024-10-15 RX ORDER — GABAPENTIN 300 MG/1
300 CAPSULE ORAL DAILY
Qty: 30 CAPSULE | Refills: 1 | Status: SHIPPED | OUTPATIENT
Start: 2024-10-15 | End: 2024-12-14

## 2024-10-15 NOTE — PROGRESS NOTES
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  He was found to have a large disc protrusion at L4-5 on the left.  On 2024 he underwent discectomy at this level.  Several migrated fragments were removed.  Postoperatively he did well for the first 3 weeks.  As soon as he was released to sit more, he went to [employer] and sat for an entire hour, and his leg pain returned.  Studies raise the specter of recurrent disc herniation.  Thus, he underwent a redo discectomy at the aforementioned level by Dr. Dillard on 2024.  Surgery was uncomplicated, and the patient's surgical incision was closed subcuticularly.  A culture was obtained as there was a good deal of boggy granulation tissue noted; cultures were negative.    Today, Mr. Gamboa presents for a postoperative incision check.  He reports that his leg pain prior to surgery is gone.  He does have some back pain around the incision that is worse whenever he is standing over the sink to wash his hands etc.  He is doing overall quite well and is walking.  He denies fever, chills, or drainage from his incision.  He has reduced his gabapentin dosage to only 1 capsule at bedtime, but has discontinued other pain medications.    Review of Systems   Musculoskeletal:  Positive for back pain.        The patient's past medical history, past surgical history, family history, and social history have been reviewed at length in the electronic medical record.    Physical examination:  Blood pressure 130/78, temperature 96.8 °F (36 °C), temperature source Temporal, height 177.8 cm (70\"), weight 88.8 kg (195 lb 12.8 oz).    Constitutional: The patient is " well-developed, well-nourished.  He is pleasant and is in no acute distress.  He does ambulate with a cane.    HEENT: normocephalic, atraumatic, sclera clear    Musculoskeletal: Motor examination does not reveal weakness in the lower extremities.    Dermatological: Lumbar incision is clean, dry, and without evidence of erythema or induration.  Minimal tenderness to palpation around the incision site.    Neurological Exam   A/A/C, speech clear, attention normal, conversant, answers questions appropriately, good historian.  Sensation is intact to light touch testing.  Gait is normal, balance is normal.   No tremors are noted.    Medical Decision Making  Diagnoses and all orders for this visit:    1. Lumbar disc herniation with radiculopathy (Primary)    I carefully reviewed activities Mr. Gamboa may return to, and explained to be careful with long bouts of sitting.  He is to avoid repetitive bending, lifting, or twisting.  He will avoid carrying loads greater than 10 to 15 pounds presently.  We also reviewed signs of infection that he should make her office aware of.  The patient is only taking one 300 mg capsule of gabapentin at night and would like to renew this.  I explained to him that this will ultimately need to be tapered but that I could asked Dr. Dillard if he was willing to renew for a short course.  He will follow-up with Dr. Dillard in 6 weeks to assess progress.    Any copied data from previous notes included in the (1) HPI, (2) PE, (3) MDM and/or assessment and plan has been reviewed and is accurate as of this day.    Regina Kurtz PA-C     Patient Care Team:  Anand De Souza MD as PCP - General  Tevin Esparza MD as Consulting Physician (Cardiac Electrophysiology)  Corine Newsome APRN as Nurse Practitioner (Cardiology)  Connor Kelley PA as Physician Assistant (Cardiology)  Anand De Souza MD as Primary Care Provider (Family Medicine)  Dave Dillard MD as Surgeon (Neurosurgery)  King  ROSALBA French as Referring Physician (Family Medicine)

## 2024-10-17 LAB
MYCOBACTERIUM SPEC CULT: NORMAL
NIGHT BLUE STAIN TISS: NORMAL

## 2024-10-24 LAB
MYCOBACTERIUM SPEC CULT: NORMAL
NIGHT BLUE STAIN TISS: NORMAL

## 2024-10-31 LAB
MYCOBACTERIUM SPEC CULT: NORMAL
NIGHT BLUE STAIN TISS: NORMAL

## 2024-11-22 ENCOUNTER — OFFICE VISIT (OUTPATIENT)
Dept: NEUROSURGERY | Facility: CLINIC | Age: 79
End: 2024-11-22
Payer: MEDICARE

## 2024-11-22 VITALS — WEIGHT: 200.4 LBS | BODY MASS INDEX: 28.69 KG/M2 | HEIGHT: 70 IN | TEMPERATURE: 96.6 F

## 2024-11-22 DIAGNOSIS — Z98.890 S/P LUMBAR DISCECTOMY: Primary | ICD-10-CM

## 2024-11-22 DIAGNOSIS — M51.16 LUMBAR DISC HERNIATION WITH RADICULOPATHY: ICD-10-CM

## 2024-11-22 RX ORDER — METHYLPREDNISOLONE 4 MG/1
TABLET ORAL
Qty: 21 TABLET | Refills: 0 | Status: SHIPPED | OUTPATIENT
Start: 2024-11-22

## 2024-11-22 NOTE — PATIENT INSTRUCTIONS
Call Dr. Dillard's office in 1 week with an update on your left leg pain.     Ask for [facility] [phone]

## 2024-11-22 NOTE — PROGRESS NOTES
Patient: Luis Gamboa  : 1945    Primary Care Provider: Anand De Souza MD    Requesting Provider: As above        History    Chief Complaint: Low back and left leg pain.    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  He was found to have a large disc protrusion at L4-5 on the left.  On 2024 he underwent discectomy at this level.  Several migrated fragments were removed.  Postoperatively he did well for the first 3 weeks.  As soon as he was released to sit more, he went to [employer] and sat for an entire hour, and his leg pain returned.  Studies raised the specter of recurrent disc herniation.  Thus, he underwent a redo discectomy at the aforementioned level on 2024. A culture was obtained as there was a good deal of boggy granulation tissue noted; cultures were negative.  He has done great until several days ago when the developed increased back pain radiating into the side of his left calf.  Symptoms are worse with activity.    Review of Systems   Constitutional:  Negative for activity change, appetite change, chills, diaphoresis, fatigue, fever and unexpected weight change.   HENT:  Negative for congestion, dental problem, drooling, ear discharge, ear pain, facial swelling, hearing loss, mouth sores, nosebleeds, postnasal drip, rhinorrhea, sinus pressure, sinus pain, sneezing, sore throat, tinnitus, trouble swallowing and voice change.    Eyes:  Negative for photophobia, pain, discharge, redness, itching and visual disturbance.   Respiratory:  Negative for apnea, cough, choking, chest tightness, shortness of breath, wheezing and stridor.    Cardiovascular:  Negative for chest pain, palpitations and leg swelling.   Gastrointestinal:  Negative for abdominal distention, abdominal pain, anal bleeding, blood in stool,  "constipation, diarrhea, nausea, rectal pain and vomiting.   Endocrine: Negative for cold intolerance, heat intolerance, polydipsia, polyphagia and polyuria.   Genitourinary:  Negative for decreased urine volume, difficulty urinating, dysuria, enuresis, flank pain, frequency, genital sores, hematuria and urgency.   Musculoskeletal:  Positive for back pain. Negative for arthralgias, gait problem, joint swelling, myalgias, neck pain and neck stiffness.   Skin:  Negative for color change, pallor, rash and wound.   Allergic/Immunologic: Negative for environmental allergies, food allergies and immunocompromised state.   Neurological:  Negative for dizziness, tremors, seizures, syncope, facial asymmetry, speech difficulty, weakness, light-headedness, numbness and headaches.   Hematological:  Negative for adenopathy. Does not bruise/bleed easily.   Psychiatric/Behavioral:  Negative for agitation, behavioral problems, confusion, decreased concentration, dysphoric mood, hallucinations, self-injury, sleep disturbance and suicidal ideas. The patient is not nervous/anxious and is not hyperactive.    All other systems reviewed and are negative.    The patient's past medical history, past surgical history, family history, and social history have been reviewed at length in the electronic medical record.      Physical Exam:   Temp 96.6 °F (35.9 °C) (Temporal)   Ht 177.8 cm (70\")   Wt 90.9 kg (200 lb 6.4 oz)   BMI 28.75 kg/m²   Lumbar incision looks good.  Trait leg raising is moderately positive at 30 degrees on the left.  His gait is limping.    Medical Decision Making      Diagnosis:   Recurrent radiculopathy.    Treatment Options:   I have ordered a Medrol Dosepak.  He will call with an update in a week.  If is not doing better then we will obtain a follow-up MRI of the lumbar spine with and without gadolinium.          I, Dr. Dillard, personally performed the services described in the documentation, as scribed in my presence, " and it is both accurate and complete.

## 2024-12-06 ENCOUNTER — HOSPITAL ENCOUNTER (OUTPATIENT)
Dept: CARDIOLOGY | Facility: HOSPITAL | Age: 79
Discharge: HOME | End: 2024-12-06
Payer: MEDICARE

## 2024-12-09 ENCOUNTER — TELEPHONE (OUTPATIENT)
Dept: CARDIOLOGY | Facility: CLINIC | Age: 79
End: 2024-12-09
Payer: MEDICARE

## 2025-01-13 ENCOUNTER — HOSPITAL ENCOUNTER (OUTPATIENT)
Dept: CARDIOLOGY | Facility: HOSPITAL | Age: 80
Discharge: HOME | End: 2025-01-13
Admitting: INTERNAL MEDICINE
Payer: MEDICARE

## 2025-01-13 VITALS
DIASTOLIC BLOOD PRESSURE: 93 MMHG | OXYGEN SATURATION: 95 % | SYSTOLIC BLOOD PRESSURE: 131 MMHG | RESPIRATION RATE: 16 BRPM | HEART RATE: 77 BPM

## 2025-01-13 DIAGNOSIS — I48.19 ATRIAL FIBRILLATION, PERSISTENT: ICD-10-CM

## 2025-01-13 DIAGNOSIS — Z95.818 PRESENCE OF WATCHMAN LEFT ATRIAL APPENDAGE CLOSURE DEVICE: ICD-10-CM

## 2025-01-13 LAB — BH CV ECHO MEAS - AO ROOT DIAM: 3.2 CM

## 2025-01-13 PROCEDURE — 93321 DOPPLER ECHO F-UP/LMTD STD: CPT

## 2025-01-13 PROCEDURE — 93312 ECHO TRANSESOPHAGEAL: CPT | Performed by: INTERNAL MEDICINE

## 2025-01-13 PROCEDURE — 93325 DOPPLER ECHO COLOR FLOW MAPG: CPT | Performed by: INTERNAL MEDICINE

## 2025-01-13 PROCEDURE — 99152 MOD SED SAME PHYS/QHP 5/>YRS: CPT | Performed by: INTERNAL MEDICINE

## 2025-01-13 PROCEDURE — 99152 MOD SED SAME PHYS/QHP 5/>YRS: CPT

## 2025-01-13 PROCEDURE — 76376 3D RENDER W/INTRP POSTPROCES: CPT

## 2025-01-13 PROCEDURE — 93325 DOPPLER ECHO COLOR FLOW MAPG: CPT

## 2025-01-13 PROCEDURE — 25010000002 FENTANYL CITRATE (PF) 50 MCG/ML SOLUTION: Performed by: INTERNAL MEDICINE

## 2025-01-13 PROCEDURE — 93312 ECHO TRANSESOPHAGEAL: CPT

## 2025-01-13 PROCEDURE — 93321 DOPPLER ECHO F-UP/LMTD STD: CPT | Performed by: INTERNAL MEDICINE

## 2025-01-13 PROCEDURE — 76376 3D RENDER W/INTRP POSTPROCES: CPT | Performed by: INTERNAL MEDICINE

## 2025-01-13 PROCEDURE — 25010000002 MIDAZOLAM PER 1 MG: Performed by: INTERNAL MEDICINE

## 2025-01-13 PROCEDURE — 93320 DOPPLER ECHO COMPLETE: CPT

## 2025-01-13 RX ORDER — FENTANYL CITRATE 50 UG/ML
INJECTION, SOLUTION INTRAMUSCULAR; INTRAVENOUS
Status: COMPLETED | OUTPATIENT
Start: 2025-01-13 | End: 2025-01-13

## 2025-01-13 RX ORDER — MIDAZOLAM HYDROCHLORIDE 1 MG/ML
INJECTION, SOLUTION INTRAMUSCULAR; INTRAVENOUS
Status: COMPLETED | OUTPATIENT
Start: 2025-01-13 | End: 2025-01-13

## 2025-01-13 RX ADMIN — FENTANYL CITRATE 50 MCG: 50 INJECTION, SOLUTION INTRAMUSCULAR; INTRAVENOUS at 14:22

## 2025-01-13 RX ADMIN — MIDAZOLAM 2 MG: 1 INJECTION INTRAMUSCULAR; INTRAVENOUS at 14:21

## 2025-01-13 NOTE — H&P
Pre-procedure History and Physical  [facility]      Patient:  Luis Gamboa  :  1945  MRN: [id number]    PCP:  Anand De Souza MD  PHONE:  [phone]    DATE: 2025  ID: Luis Gamboa is a 79 y.o. 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  Recurrent progressive NYHA class III symptoms with abnormal EKG and acceptable chest x-ray with essentially normal right dominant coronary artery and preserved systolic LV dysfunction, LVEF (0.66) with continued medical therapy felt warranted, 2009.   CCS class 0 chest discomfort/NYHA class II-III fatigue symptoms with abnormal monitor demonstrating bradycardia, 2019.   Stress echocardiogram 2019: Estimated EF = 65%.Left ventricular systolic function is normal.There is no evidence of pericardial effusion.Acceptable negative echocardiographic exercise stress test without anginal type chest discomfort, definitive ischemic EKG changes, or echocardiographic regional wall motion abnormality with preserved systolic left ventricular function following exercise to 85% predicted maximal heart rate and 88% predicted exercise capacity.Overall, current study suggests low probability for significant focal obstructive coronary artery disease with preserved systolic left ventricular function  Echocardiogram 12/15/2022: LVEF 55%, LV wall thickness consistent with hypertrophy, cardiac valves are anatomically and functionally normal, RVSP less than 35 mmHg, atrial fibrillation noted throughout study  Regadenoson stress test 3/6/2024: LVEF 57%, normal myocardial perfusion study with no evidence of ischemia, scant CAC on CT attenuation correction images  Residual class I symptoms,  August 2020, March 2021, October 2021, November 2022, May 2023, December 2023, March 2024, June 2024  Bradycardia with Holter monitor 8/20/2019: Average heart rate was 52 bpm, heart rate less than 50 bpm 68% the time but there were no pauses and only 9 PVCs and 2200 PACs which represent 3% total heartbeats with no atrial fibrillation  Moderate dyslipidemia; intolerant to statins.   Labile hypertension.  Mildly overweight, BMI 28.90.  Obstructive sleep apnea with intermittent CPAP therapy (did not tolerate).   Hiatal hernia/probable GERD, April 2009.  Remote left calf DVT/Coumadin therapy - data deficit.   Chronic lower tract obstructive symptoms/probable BPH.   Crohn’s disease with partial colectomy and colostomy - data deficit.   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   Chronic intermittent anxiety/remote depression.   Chronic insomnia.   Intermittent allergic rhinitis.   Retroperitoneal schwannoma: Abnormal CT abdomen/pelvis demonstrating a 3.2 x 4.2 cm mass in the leftward retroperitoneum with attenuation coefficient consistent with a solid, noncystic mass, September 2019, 3.6 x 4.8 cm on CT abdomen/pelvis April 2024  Paroxysmal atrial fibrillation  Diagnosed November 2022 in office, asymptomatic  CHADsVasc 3, started Eliquis  Holter monitor showed 100% atrial fib burden with heart rate ranged between  bpm with average 83 bpm with occasional PVCs  Successful external cardioversion 1/16/2023  Mobile cardiac outpatient telemetry monitor March 2023 with no evidence of atrial fibrillation, SVT, pauses, heart block, or VT.  Heart rate ranged between  bpm with average 61 bpm, 9% PAC burden, less than 1% PVC burden  27 mm Watchman FLX device implant 11/13/2023, ARCHIE showed EF 51-55%, mild MR, device well-seated with no periprosthetic flow.  ARCHIE 1/3/2024: 27 mm Watchman FLX atrial appendage occlusion device in place.  Small jet of periprosthetic flow measuring approximately 2-3 mm.  This is  consistent with adequate NGA occlusion.  LVEF 51-55%, mild to moderate MR, severe grade 4 plaque in the aortic arch present  [facility] 2-day hospitalization April 2024 for dyspnea in the setting of hypertensive urgency.  COVID May 2024  Remote operations:  Partial colectomy with colostomy, 2001.   Hand surgery secondary to trauma, 2004.   Lithotripsy September 2019  Watchman device implant  Lumbar discectomy    BRIEF HPI: Mr. Gamboa is a 80 y/o male with above noted history who presents for 1 year ARCHIE post Watchman implant. He underwent Watchman implant with Dr. Esparza on 11/13/23. ARCHIE last year showed small periprosthetic flow of 2-3mm which was acceptable. He also had mild-mod MR and grade 4 plaque in the aortic arch. He presents for yearly follow up ARCHIE per protocol.       Allergies:      Allergies   Allergen Reactions    Imuran [Azathioprine] GI Intolerance      hemorrhagic urticaria.  Bleeding     Crestor [Rosuvastatin] Myalgia       MEDICATIONS:  Current Outpatient Medications   Medication Instructions    amLODIPine (NORVASC) 5 mg, Oral, Every 24 Hours Scheduled    aspirin 81 mg, Oral, Daily    Budesonide (ENTOCORT EC) 3 mg, Daily    finasteride (PROSCAR) 5 mg, Daily    gabapentin (NEURONTIN) 300 mg, Oral, Daily    immune globulin, human, 1 GM/5ML solution Weekly    Latanoprost 0.005 % emulsion Apply  to eye(s) as directed by provider.    MAGNESIUM OXIDE  mg, Daily    methylPREDNISolone (MEDROL) 4 MG dose pack Take as directed on package instructions.    Multiple Vitamins-Minerals (MULTIVITAMIN ADULT PO) 1 tablet, Daily    nebivolol (BYSTOLIC) 5 mg, Oral, Daily    nitroglycerin (NITROSTAT) 0.4 MG SL tablet 1 under the tongue as needed for angina, may repeat q5mins for up three doses    omeprazole (PRILOSEC) 20 mg, Daily    ondansetron (ZOFRAN) 8 mg, As Needed    ondansetron ODT (ZOFRAN-ODT) 4 mg, Oral, Every 6 Hours PRN    sertraline (ZOLOFT) 50 mg, Daily    spironolactone (ALDACTONE) 25 MG tablet Take 0.5  (one-half) tablet by mouth Daily.    tamsulosin (FLOMAX) 0.4 MG capsule 24 hr capsule 1 capsule, Daily    vitamin C (ASCORBIC ACID) 500 mg, Daily    zolpidem (AMBIEN) 10 mg, Nightly PRN       Past medical & surgical history, social and family history reviewed in the electronic medical record.    ROS: Pertinent positives listed in the HPI and problem list above. All others reviewed and negative.     Physical Exam:   BP (!) 160/101   Pulse 77   Resp 16   SpO2 100%     Constitutional:    Alert, cooperative, in no acute distress   Neck:     No Jugular venous distention, adenopathy, or thyromegaly noted.    Heart:    Irregular rhythm and normal rate, normal S1 and S2, no murmurs,gallops, rubs, or clicks. No distinct PMI noted.    Lungs:     Clear to auscultation bilaterally, respirations regular, even and unlabored    Extremities:   No gross deformities, no edema, clubbing, or cyanosis.        Labs and Diagnostic Data:          Lab Results   Component Value Date    AST 26 09/16/2024    ALT 24 09/16/2024                     ARCHIE 1/3/24:  Interpretation Summary         27 mm Watchman FLX atrial appendage occlusion device in place.  There is a small jet of periprosthetic flow measuring proximately 2-3 mm.  This is consistent with adequate left atrial appendage occlusion.    Left ventricular ejection fraction appears to be 51 - 55%.    Mild to moderate mitral valve regurgitation is present.    There is severe, (grade 4) plaque in the aortic arch present.    IMPRESSION:  78 y/o male with PAF, s/p Watchman implant 11/2023. He presents for 1 year ARCHIE per protocol.     PLAN:  Procedure to perform: ARCHIE per Dr. Murphy. Risks, benefits and alternatives to the procedure explained to the patient and he understands and wishes to proceed.       Electronically signed by Hien Hicks PA-C, 01/13/25, 2:05 PM EST.    Patient's ARCHIE was completed without complications.  There is a stable peridevice leak measuring 3.5 mm.   There is no device associated thrombus.  Patient will continue on aspirin therapy.  Discussed findings with the patient and his family.

## 2025-02-07 ENCOUNTER — OFFICE VISIT (OUTPATIENT)
Dept: CARDIOLOGY | Facility: CLINIC | Age: 80
End: 2025-02-07
Payer: MEDICARE

## 2025-02-07 VITALS
HEART RATE: 86 BPM | WEIGHT: 195.8 LBS | HEIGHT: 71 IN | BODY MASS INDEX: 27.41 KG/M2 | DIASTOLIC BLOOD PRESSURE: 68 MMHG | SYSTOLIC BLOOD PRESSURE: 130 MMHG | OXYGEN SATURATION: 98 %

## 2025-02-07 DIAGNOSIS — E78.5 DYSLIPIDEMIA: ICD-10-CM

## 2025-02-07 DIAGNOSIS — I48.19 ATRIAL FIBRILLATION, PERSISTENT: Primary | ICD-10-CM

## 2025-02-07 DIAGNOSIS — I10 ESSENTIAL HYPERTENSION: ICD-10-CM

## 2025-02-07 PROCEDURE — 99214 OFFICE O/P EST MOD 30 MIN: CPT | Performed by: NURSE PRACTITIONER

## 2025-02-07 PROCEDURE — 1159F MED LIST DOCD IN RCRD: CPT | Performed by: NURSE PRACTITIONER

## 2025-02-07 PROCEDURE — 3075F SYST BP GE 130 - 139MM HG: CPT | Performed by: NURSE PRACTITIONER

## 2025-02-07 PROCEDURE — 3078F DIAST BP <80 MM HG: CPT | Performed by: NURSE PRACTITIONER

## 2025-02-07 PROCEDURE — 1160F RVW MEDS BY RX/DR IN RCRD: CPT | Performed by: NURSE PRACTITIONER

## 2025-08-08 ENCOUNTER — TELEPHONE (OUTPATIENT)
Dept: CARDIOLOGY | Facility: CLINIC | Age: 80
End: 2025-08-08

## 2025-08-08 RX ORDER — NEBIVOLOL 5 MG/1
5 TABLET ORAL DAILY
Qty: 90 TABLET | Refills: 3 | Status: SHIPPED | OUTPATIENT
Start: 2025-08-08

## 2025-08-20 DIAGNOSIS — I10 ESSENTIAL HYPERTENSION: ICD-10-CM

## 2025-08-20 RX ORDER — AMLODIPINE BESYLATE 5 MG/1
5 TABLET ORAL
Qty: 90 TABLET | Refills: 2 | Status: SHIPPED | OUTPATIENT
Start: 2025-08-20

## 2025-08-20 RX ORDER — SPIRONOLACTONE 25 MG/1
12.5 TABLET ORAL DAILY
Qty: 45 TABLET | Refills: 3 | Status: SHIPPED | OUTPATIENT
Start: 2025-08-20

## (undated) DEVICE — CONN TBG Y 5 IN 1 LF STRL

## (undated) DEVICE — Device: Brand: MEDEX

## (undated) DEVICE — ANTIBACTERIAL UNDYED BRAIDED (POLYGLACTIN 910), SYNTHETIC ABSORBABLE SUTURE: Brand: COATED VICRYL

## (undated) DEVICE — C-ARM DRAPE: Brand: DEROYAL

## (undated) DEVICE — PATIENT RETURN ELECTRODE, SINGLE-USE, CONTACT QUALITY MONITORING, ADULT, WITH 9FT CORD, FOR PATIENTS WEIGING OVER 33LBS. (15KG): Brand: MEGADYNE

## (undated) DEVICE — DRSNG SURESITE123 4X4.8IN

## (undated) DEVICE — SOL NACL 0.9PCT 1000ML

## (undated) DEVICE — DRSNG WND BORDR/ADHS NONADHR/GZ LF 4X4IN STRL

## (undated) DEVICE — SUT VIC PLS CTD ANTIB BR 3/0 8/18IN 45CM

## (undated) DEVICE — BLANKT WARM UPPR/BDY ARM/OUT 57X196CM

## (undated) DEVICE — CABL BIPOL MEGADYNE 12FT DISP

## (undated) DEVICE — SYS CLS VASC/VENI VASCADE MVP 6TO12F

## (undated) DEVICE — STRAP POSTN KN/BDY FM 5X72IN DISP

## (undated) DEVICE — ACQGUIDE MINI-S, 65CM - L2 WITH ACQCROSS QX: Brand: ACQGUIDE MINI-S

## (undated) DEVICE — GLV SURG PREMIERPRO MIC LTX PF SZ7.5 BRN

## (undated) DEVICE — CONTRL CONTRST CHMBRD W/TBG72IN REUS

## (undated) DEVICE — GLV SURG PREMIERPRO ORTHO LTX PF SZ8 BRN

## (undated) DEVICE — INTRO SHEATH ENGAGE W/50 GW .038 9F12

## (undated) DEVICE — DOME MONITORING W BONDED STPCK BIOTRANS2

## (undated) DEVICE — CULT AERO SGL

## (undated) DEVICE — CATH DIAG EXPO .052 PIG 6F 110CM

## (undated) DEVICE — TOOL MR8-14MH30D MR8 14CM MATCH DMD 3MM: Brand: MIDAS REX MR8

## (undated) DEVICE — SYR LUERLOK 30CC

## (undated) DEVICE — STERILE (15.2 TAPERED TO 7.6 X 183CM) POLYETHYLENE ACCORDION-FOLDED COVER FOR USE WITH SIEMENS ACUNAV ULTRASOUND CATHETER FAMILY CONNECTOR: Brand: SWIFTLINK TRANSDUCER COVER

## (undated) DEVICE — CATH ULTRASND ECHO ACUNAV FOR ACUSON 8F 90CM

## (undated) DEVICE — INTRO SHEATH ENGAGE W/50 GW .038 8F12

## (undated) DEVICE — INTRO SHEATH FAST/CATH LG/LUM 11F .038IN 12CM

## (undated) DEVICE — ADHS SKIN PREMIERPRO EXOFIN TOPICAL HI/VISC .5ML

## (undated) DEVICE — PK NEURO DISC 10

## (undated) DEVICE — GW DIAG .032

## (undated) DEVICE — DECANT BG O JET

## (undated) DEVICE — ST EXT IV LG BORE NDLESS FLTR LL 17IN

## (undated) DEVICE — ST INF PRI SMRTSTE 20DRP 2VLV 24ML 117

## (undated) DEVICE — HDRST INTUB GENTLETOUCH SLOT 7IN RT

## (undated) DEVICE — ADULT NASAL CO2 SAMPLING WITH O2 DELIVERY CANNULA FOR CAPNOFLEX MODULE: Brand: VITAL SIGNS™

## (undated) DEVICE — LIMB HOLDER, WRIST/ANKLE: Brand: DEROYAL

## (undated) DEVICE — LEX ELECTRO PHYSIOLOGY: Brand: MEDLINE INDUSTRIES, INC.

## (undated) DEVICE — CULT ANAERB

## (undated) DEVICE — ADULT, W/LG. BACK PAD, RADIOTRANSPARENT ELEMENT AND LEAD WIRE COMPATIBLE W/: Brand: DEFIBRILLATION ELECTRODES

## (undated) DEVICE — ST EXT IV SMRTSTE 2VLV FIX M LL 6ML 41

## (undated) DEVICE — KT MANIFLD EP

## (undated) DEVICE — SET PRIMARY GRVTY 10DP MALE LL 104IN

## (undated) DEVICE — ACCESS SHEATH WITH DILATOR: Brand: WATCHMAN FXD CURVE™ ACCESS SYSTEM